# Patient Record
Sex: FEMALE | Race: WHITE | NOT HISPANIC OR LATINO | Employment: FULL TIME | URBAN - METROPOLITAN AREA
[De-identification: names, ages, dates, MRNs, and addresses within clinical notes are randomized per-mention and may not be internally consistent; named-entity substitution may affect disease eponyms.]

---

## 2017-01-03 ENCOUNTER — OFFICE VISIT (OUTPATIENT)
Dept: OBSTETRICS AND GYNECOLOGY | Facility: CLINIC | Age: 32
End: 2017-01-03
Payer: COMMERCIAL

## 2017-01-03 VITALS
BODY MASS INDEX: 21.27 KG/M2 | WEIGHT: 124.56 LBS | DIASTOLIC BLOOD PRESSURE: 58 MMHG | HEIGHT: 64 IN | SYSTOLIC BLOOD PRESSURE: 110 MMHG

## 2017-01-03 DIAGNOSIS — Z30.9 ENCOUNTER FOR CONTRACEPTIVE MANAGEMENT, UNSPECIFIED CONTRACEPTIVE ENCOUNTER TYPE: ICD-10-CM

## 2017-01-03 DIAGNOSIS — Z01.419 ENCOUNTER FOR GYNECOLOGICAL EXAMINATION WITHOUT ABNORMAL FINDING: Primary | ICD-10-CM

## 2017-01-03 PROCEDURE — 99395 PREV VISIT EST AGE 18-39: CPT | Mod: S$GLB,,, | Performed by: OBSTETRICS & GYNECOLOGY

## 2017-01-03 PROCEDURE — 88175 CYTOPATH C/V AUTO FLUID REDO: CPT

## 2017-01-03 PROCEDURE — 99999 PR PBB SHADOW E&M-EST. PATIENT-LVL III: CPT | Mod: PBBFAC,,, | Performed by: OBSTETRICS & GYNECOLOGY

## 2017-01-03 NOTE — PROGRESS NOTES
"CC: Well woman exam    Sofi Vidal is a 31 y.o. female  presents for a well woman exam.  She is considering future birth control.   Does not want OCPs.   Coping with the loss of her , and is raising their son.         Past Medical History   Diagnosis Date    Abnormal cervical Papanicolaou smear 12     Colposcopy , HONEY 1    Cholelithiasis complicating pregnancy, antepartum        Past Surgical History   Procedure Laterality Date    Colposcopy         OB History    Para Term  AB SAB TAB Ectopic Multiple Living   1 1 0 0 0 0 0 0 0 0      # Outcome Date GA Lbr Mahin/2nd Weight Sex Delivery Anes PTL Lv   1 Para                   Family History   Problem Relation Age of Onset    Breast cancer Maternal Grandmother 68    Colon cancer Neg Hx     Ovarian cancer Neg Hx        Social History   Substance Use Topics    Smoking status: Never Smoker    Smokeless tobacco: Never Used    Alcohol use Yes      Comment: occ       Visit Vitals    BP (!) 110/58    Ht 5' 4" (1.626 m)    Wt 56.5 kg (124 lb 9 oz)    LMP 2016    BMI 21.38 kg/m2       ROS:  GENERAL: Denies weight gain or weight loss. Feeling well overall.   SKIN: Denies rash or lesions.   HEAD: Denies head injury or headache.   NODES: Denies enlarged lymph nodes.   CHEST: Denies chest pain or shortness of breath.   CARDIOVASCULAR: Denies palpitations or left sided chest pain.   ABDOMEN: No abdominal pain, constipation, diarrhea, nausea, vomiting or rectal bleeding.   URINARY: No frequency, dysuria, hematuria, or burning on urination.  REPRODUCTIVE: See HPI.   BREASTS: The patient performs breast self-examination and denies pain, lumps, or nipple discharge.   HEMATOLOGIC: No easy bruisability or excessive bleeding.  MUSCULOSKELETAL: Denies joint pain or swelling.   NEUROLOGIC: Denies syncope or weakness.   PSYCHIATRIC: Denies depression, anxiety or mood swings.    Physical Exam:    APPEARANCE: Well nourished, well " developed, in no acute distress.  AFFECT: WNL, alert and oriented x 3  SKIN: No acne or hirsutism  NECK: Neck symmetric without masses or thyromegaly  NODES: No inguinal, cervical, axillary, or femoral lymph node enlargement  CHEST: Good respiratory effect  ABDOMEN: Soft.  No tenderness or masses.  No hepatosplenomegaly.  No hernias.  BREASTS: Symmetrical, no skin changes or visible lesions.  No palpable masses, nipple discharge bilaterally.  PELVIC: Normal external genitalia without lesions.  Normal hair distribution.  Adequate perineal body, normal urethral meatus.  Vagina moist and well rugated without lesions or discharge.  Cervix pink, without lesions, discharge or tenderness.  No significant cystocele or rectocele.  Bimanual exam shows uterus to be normal size, regular, mobile and nontender.  Adnexa without masses or tenderness.    EXTREMITIES: No edema.    ASSESSMENT AND PLAN  1. Encounter for gynecological examination without abnormal finding  Liquid-based pap smear, screening   2. Encounter for contraceptive management, unspecified contraceptive encounter type       Discussed birth control options- possible OCPs, combination vs progesterone only pill, NUVA RING, ORTHO EVRA RING, increased risks of elevated BP   On birth control with estrogen.  Possible MIRENA IUD VS Paragard IUD. NEXAPLANON   Possible barrier methods- sponge, diaphragm, spermacides, condoms.         Patient was counseled today on A.C.S. Pap guidelines and recommendations for yearly pelvic exams, mammograms and monthly self breast exams; to see her PCP for other health maintenance.   F/U for MIRENA INSERTION

## 2017-01-03 NOTE — MR AVS SNAPSHOT
"    [place] [facility]   [facility]  [place] [address]  Phone: [phone]                  Sofi Vidal   1/3/2017 10:00 AM   Office Visit    Description:  Female : 1985   Provider:  Veronica Mccormack MD   Department:  [place] [facility]           Reason for Visit     Well Woman                To Do List           Future Appointments        Provider Department Dept Phone    2017 1:00 PM Cheri June MD [place] [facility] [phone]      Goals (5 Years of Data)     None      [facility]     [facility] -  Assistance  Registered nurses in the [facility] On Call Center provide clinical advisement, health education, appointment booking, and other advisory services.  Call for this free service at [phone].             Medications           Message regarding Medications     Verify the changes and/or additions to your medication regime listed below are the same as discussed with your clinician today.  If any of these changes or additions are incorrect, please notify your healthcare provider.        STOP taking these medications     norgestimate-ethinyl estradiol (ORTHO TRI-CYCLEN,TRI-SPRINTEC) 0.18/0.215/0.25 mg-35 mcg (28) tablet Take 1 tablet by mouth once daily.           Verify that the below list of medications is an accurate representation of the medications you are currently taking.  If none reported, the list may be blank. If incorrect, please contact your healthcare provider. Carry this list with you in case of emergency.           Current Medications     albuterol 90 mcg/actuation inhaler Inhale 2 puffs into the lungs every 6 (six) hours as needed for Wheezing.           Clinical Reference Information           Vital Signs - Last Recorded  Most recent update: 1/3/2017 10:15 AM by Sasha Maldonado MA    BP Ht Wt LMP BMI    (!) 110/58 5' 4" (1.626 m) 56.5 kg (124 lb 9 oz) 2016 21.38 kg/m2      Blood Pressure          Most " Recent Value    BP  (!)  110/58      Allergies as of 1/3/2017     No Known Allergies      Immunizations Administered on Date of Encounter - 1/3/2017     None

## 2017-01-04 ENCOUNTER — OFFICE VISIT (OUTPATIENT)
Dept: INTERNAL MEDICINE | Facility: CLINIC | Age: 32
End: 2017-01-04
Payer: COMMERCIAL

## 2017-01-04 VITALS
DIASTOLIC BLOOD PRESSURE: 62 MMHG | RESPIRATION RATE: 16 BRPM | SYSTOLIC BLOOD PRESSURE: 98 MMHG | HEART RATE: 94 BPM | HEIGHT: 64 IN | TEMPERATURE: 98 F | WEIGHT: 127.44 LBS | BODY MASS INDEX: 21.76 KG/M2

## 2017-01-04 DIAGNOSIS — Z00.00 ANNUAL PHYSICAL EXAM: Primary | ICD-10-CM

## 2017-01-04 DIAGNOSIS — G47.00 INSOMNIA, UNSPECIFIED TYPE: ICD-10-CM

## 2017-01-04 DIAGNOSIS — K80.20 CALCULUS OF GALLBLADDER WITHOUT CHOLECYSTITIS WITHOUT OBSTRUCTION: ICD-10-CM

## 2017-01-04 PROCEDURE — 90688 IIV4 VACCINE SPLT 0.5 ML IM: CPT | Mod: S$GLB,,, | Performed by: INTERNAL MEDICINE

## 2017-01-04 PROCEDURE — 90471 IMMUNIZATION ADMIN: CPT | Mod: S$GLB,,, | Performed by: INTERNAL MEDICINE

## 2017-01-04 PROCEDURE — 99999 PR PBB SHADOW E&M-EST. PATIENT-LVL III: CPT | Mod: PBBFAC,,, | Performed by: INTERNAL MEDICINE

## 2017-01-04 PROCEDURE — 99385 PREV VISIT NEW AGE 18-39: CPT | Mod: 25,S$GLB,, | Performed by: INTERNAL MEDICINE

## 2017-01-04 RX ORDER — MULTIVITAMIN
1 TABLET ORAL DAILY
COMMUNITY
End: 2018-02-27

## 2017-01-04 NOTE — PROGRESS NOTES
Subjective:      Sofi Vidal is a 31 y.o.yo female who presents for annual exam.    Family History:  family history includes Breast cancer (age of onset: 68) in her maternal grandmother; Cancer in her maternal grandmother. There is no history of Colon cancer or Ovarian cancer.    Health Maintenance:  Health Maintenance       Date Due Completion Date    Lipid Panel 1985 ---    Pap Smear 2004 ---    Influenza Vaccine 2016 ---    TETANUS VACCINE 2024      OB/GYN 2017  Tetanus     Eye exam: 2016  Dental Exam: 2016    Body mass index is 21.87 kg/(m^2).      Meds:   Current Outpatient Prescriptions:     albuterol 90 mcg/actuation inhaler, Inhale 2 puffs into the lungs every 6 (six) hours as needed for Wheezing., Disp: 18 g, Rfl: 1    LACTOBACILLUS ACIDOPHILUS (PROBIOTIC ORAL), Take by mouth., Disp: , Rfl:     multivitamin (ONE DAILY MULTIVITAMIN) per tablet, Take 1 tablet by mouth once daily., Disp: , Rfl:     PMHx:   Past Medical History   Diagnosis Date    Abnormal cervical Papanicolaou smear 12     Colposcopy , HONEY 1    Childhood asthma     Cholelithiasis complicating pregnancy, antepartum        PSHx:  Past Surgical History   Procedure Laterality Date    Colposcopy      Lasik         SocHx:   Social History     Social History    Marital status:      Spouse name: N/A    Number of children: N/A    Years of education: N/A     Social History Main Topics    Smoking status: Never Smoker    Smokeless tobacco: Never Used    Alcohol use Yes      Comment: 2 drinks per week    Drug use: No    Sexual activity: Yes     Partners: Male     Birth control/ protection: None     Other Topics Concern    None     Social History Narrative     recently  of cancer, has a 2 year old child       Review of Systems   Constitutional: Negative for chills, diaphoresis, fatigue and fever.   HENT: Negative for congestion, ear discharge, ear pain,  postnasal drip, rhinorrhea, sinus pressure and sore throat.         Seasonal allergy symptoms relieved with Claritin   Eyes: Negative for visual disturbance.   Respiratory: Negative for cough, chest tightness, shortness of breath and wheezing.    Cardiovascular: Negative for chest pain, palpitations and leg swelling.   Gastrointestinal: Negative for abdominal pain, blood in stool, constipation, diarrhea, nausea and vomiting.        H/o gallstones during pregnancy, intermittent abdominal pain after eating fatty fried foods so avoids them   Genitourinary: Negative for decreased urine volume, dysuria, frequency, hematuria and menstrual problem.        Hematuria reported on labs for life insurance, denies gross hematuria, no frequent UTI's   Musculoskeletal: Negative for arthralgias and myalgias.   Skin: Negative for rash.   Neurological: Positive for dizziness. Negative for weakness, light-headedness, numbness and headaches.   Hematological: Negative for adenopathy.       Objective:      Physical Exam   Constitutional: She is oriented to person, place, and time. Vital signs are normal. She appears well-developed and well-nourished. No distress.   HENT:   Head: Normocephalic and atraumatic.   Right Ear: Hearing, tympanic membrane, external ear and ear canal normal. Tympanic membrane is not erythematous and not bulging.   Left Ear: Hearing, tympanic membrane, external ear and ear canal normal. Tympanic membrane is not erythematous and not bulging.   Nose: Nose normal.   Mouth/Throat: Uvula is midline, oropharynx is clear and moist and mucous membranes are normal. No oropharyngeal exudate.   Eyes: Conjunctivae, EOM and lids are normal. Pupils are equal, round, and reactive to light. No scleral icterus.   Neck: Normal range of motion. Neck supple. No thyroid mass and no thyromegaly present.   Cardiovascular: Normal rate, regular rhythm, normal heart sounds and intact distal pulses.    No murmur heard.  Pulmonary/Chest:  Effort normal and breath sounds normal. She has no wheezes.   Abdominal: Soft. Bowel sounds are normal. She exhibits no distension. There is no hepatosplenomegaly. There is no tenderness. There is no rigidity, no rebound, no guarding and negative Lutz's sign.   Musculoskeletal: Normal range of motion. She exhibits no edema.   Lymphadenopathy:     She has no cervical adenopathy.        Right: No supraclavicular adenopathy present.        Left: No supraclavicular adenopathy present.   Neurological: She is alert and oriented to person, place, and time. She has normal strength and normal reflexes. She displays no tremor. No sensory deficit. Coordination and gait normal.   Skin: Skin is warm, dry and intact. No rash noted. She is not diaphoretic.   Psychiatric: She has a normal mood and affect.   Vitals reviewed.      Assessment:       1. Annual physical exam    2. Insomnia, unspecified type    3. Calculus of gallbladder without cholecystitis without obstruction        Plan:       1. Ordered CBC, CMP, TSH, lipid panel and U/A. Administered flu vaccine.   2. Has tried OTC sleep aids in past, may try melatonin at night.  3. Discovered during pregnancy, not symptomatic at this time.     RTC in 1 year or sooner if needed    Cheri June MD

## 2017-01-04 NOTE — MR AVS SNAPSHOT
[place] - [facility]   [facility]  [place] [address]  Phone: [phone]  Fax: [phone]                  Sofi Vidal   2017 1:00 PM   Office Visit    Description:  Female : 1985   Provider:  Cheri June MD   Department:  [place] [facility]           Reason for Visit     Annual Exam           Diagnoses this Visit        Comments    Annual physical exam    -  Primary     Insomnia, unspecified type                To Do List           Future Appointments        Provider Department Dept Phone    2017 10:00 AM SPECIMEN, [facility] - Specimen Lab [phone]    2017 10:15 AM LAB, [facility] - Laboratory [phone]      Goals (5 Years of Data)     None      [facility]     [facility] On Call Nurse Care Line -  Assistance  Registered nurses in the [facility] On Call Center provide clinical advisement, health education, appointment booking, and other advisory services.  Call for this free service at [phone].             Medications           Message regarding Medications     Verify the changes and/or additions to your medication regime listed below are the same as discussed with your clinician today.  If any of these changes or additions are incorrect, please notify your healthcare provider.             Verify that the below list of medications is an accurate representation of the medications you are currently taking.  If none reported, the list may be blank. If incorrect, please contact your healthcare provider. Carry this list with you in case of emergency.           Current Medications     albuterol 90 mcg/actuation inhaler Inhale 2 puffs into the lungs every 6 (six) hours as needed for Wheezing.           Clinical Reference Information           Vital Signs - Last Recorded  Most recent update: 2017  1:10 PM by Jose R Farmer MA    BP Pulse Temp Resp Ht Wt    98/62 (BP Location: Left arm, Patient Position:  "Sitting, BP Method: Manual) 94 98 °F (36.7 °C) (Oral) 16 5' 4" (1.626 m) 57.8 kg (127 lb 6.8 oz)    LMP BMI             12/11/2016 21.87 kg/m2         Blood Pressure          Most Recent Value    BP  98/62      Allergies as of 1/4/2017     No Known Allergies      Immunizations Administered on Date of Encounter - 1/4/2017     None      Orders Placed During Today's Visit     Future Labs/Procedures Expected by Expires    CBC auto differential  1/4/2017 3/5/2018    Comprehensive metabolic panel  1/4/2017 3/5/2018    Lipid panel  1/4/2017 3/5/2018    TSH  1/4/2017 3/5/2018    Urinalysis  As directed 1/4/2018      Instructions    May try melatonin for sleep    Behavioral Modifications for Insomnia:     1. Implement stimulus control: [place] bedroom for sleep and intimacy only. Do not use electronics in bed. Leave bedroom when frustrated from not sleeping. Engage in relaxation before returning.    2. Avoid clock watching. Avoid thinking/worrying about sleep when trying to fall asleep.    3.  Maintain a regular sleep schedule: go to bed at the same time and wake up at the same time each day. Staying aying awake during the day helps you to fall asleep at night.     4. Limit caffeinated products. Caffeinated beverages and foods (coffee, tea, cola, chocolate) can cause difficulty falling asleep, awakenings during the night, and shallow sleep.  Even caffeine early in the day can disrupt nighttime sleep.    5. Make sure the bedroom is dark, quiet and cool. A comfortable, noise-free sleep environment will reduce the likelihood that you will wake up during the night.  Noise that does not awaken you may also disturb the quality of your sleep.  Carpeting, insulated curtains, and closing the door may help.    6. Exercise regularly. Increase physical activity but avoid strenuous exercise near bedtime. Schedule exercise times so that they do not occur within 3 hours of when you intend to go to bed.  Exercise makes it easier to initiate " sleep and deepen sleep.    7. Eat regular meals and do not go to bed hungry. Hunger may disturb sleep.  A light snack at bedtime (especially carbohydrates) may help sleep, but avoid greasy or heavy foods.    8. Avoid excessive liquids in the evening. Reducing liquid intake will minimize the need for nighttime trips to the bathroom.

## 2017-01-04 NOTE — PATIENT INSTRUCTIONS
May try melatonin for sleep    Behavioral Modifications for Insomnia:     1. Implement stimulus control: [place] bedroom for sleep and intimacy only. Do not use electronics in bed. Leave bedroom when frustrated from not sleeping. Engage in relaxation before returning.    2. Avoid clock watching. Avoid thinking/worrying about sleep when trying to fall asleep.    3.  Maintain a regular sleep schedule: go to bed at the same time and wake up at the same time each day. Staying aying awake during the day helps you to fall asleep at night.     4. Limit caffeinated products. Caffeinated beverages and foods (coffee, tea, cola, chocolate) can cause difficulty falling asleep, awakenings during the night, and shallow sleep.  Even caffeine early in the day can disrupt nighttime sleep.    5. Make sure the bedroom is dark, quiet and cool. A comfortable, noise-free sleep environment will reduce the likelihood that you will wake up during the night.  Noise that does not awaken you may also disturb the quality of your sleep.  Carpeting, insulated curtains, and closing the door may help.    6. Exercise regularly. Increase physical activity but avoid strenuous exercise near bedtime. Schedule exercise times so that they do not occur within 3 hours of when you intend to go to bed.  Exercise makes it easier to initiate sleep and deepen sleep.    7. Eat regular meals and do not go to bed hungry. Hunger may disturb sleep.  A light snack at bedtime (especially carbohydrates) may help sleep, but avoid greasy or heavy foods.    8. Avoid excessive liquids in the evening. Reducing liquid intake will minimize the need for nighttime trips to the bathroom.

## 2017-01-05 ENCOUNTER — LAB VISIT (OUTPATIENT)
Dept: LAB | Facility: HOSPITAL | Age: 32
End: 2017-01-05
Attending: INTERNAL MEDICINE
Payer: COMMERCIAL

## 2017-01-05 DIAGNOSIS — Z00.00 ANNUAL PHYSICAL EXAM: ICD-10-CM

## 2017-01-05 LAB
ALBUMIN SERPL BCP-MCNC: 3.9 G/DL
ALP SERPL-CCNC: 51 U/L
ALT SERPL W/O P-5'-P-CCNC: 10 U/L
ANION GAP SERPL CALC-SCNC: 9 MMOL/L
AST SERPL-CCNC: 15 U/L
BASOPHILS # BLD AUTO: 0.03 K/UL
BASOPHILS NFR BLD: 0.5 %
BILIRUB SERPL-MCNC: 1.3 MG/DL
BUN SERPL-MCNC: 11 MG/DL
CALCIUM SERPL-MCNC: 9 MG/DL
CHLORIDE SERPL-SCNC: 105 MMOL/L
CHOLEST/HDLC SERPL: 2.4 {RATIO}
CO2 SERPL-SCNC: 24 MMOL/L
CREAT SERPL-MCNC: 0.7 MG/DL
DIFFERENTIAL METHOD: NORMAL
EOSINOPHIL # BLD AUTO: 0.1 K/UL
EOSINOPHIL NFR BLD: 1.6 %
ERYTHROCYTE [DISTWIDTH] IN BLOOD BY AUTOMATED COUNT: 13.2 %
EST. GFR  (AFRICAN AMERICAN): >60 ML/MIN/1.73 M^2
EST. GFR  (NON AFRICAN AMERICAN): >60 ML/MIN/1.73 M^2
GLUCOSE SERPL-MCNC: 73 MG/DL
HCT VFR BLD AUTO: 39.6 %
HDL/CHOLESTEROL RATIO: 41.7 %
HDLC SERPL-MCNC: 187 MG/DL
HDLC SERPL-MCNC: 78 MG/DL
HGB BLD-MCNC: 13.1 G/DL
LDLC SERPL CALC-MCNC: 95 MG/DL
LYMPHOCYTES # BLD AUTO: 1.4 K/UL
LYMPHOCYTES NFR BLD: 25.2 %
MCH RBC QN AUTO: 28.9 PG
MCHC RBC AUTO-ENTMCNC: 33.1 %
MCV RBC AUTO: 87 FL
MONOCYTES # BLD AUTO: 0.4 K/UL
MONOCYTES NFR BLD: 6.7 %
NEUTROPHILS # BLD AUTO: 3.6 K/UL
NEUTROPHILS NFR BLD: 65.8 %
NONHDLC SERPL-MCNC: 109 MG/DL
PLATELET # BLD AUTO: 185 K/UL
PMV BLD AUTO: 11.7 FL
POTASSIUM SERPL-SCNC: 4 MMOL/L
PROT SERPL-MCNC: 7.1 G/DL
RBC # BLD AUTO: 4.53 M/UL
SODIUM SERPL-SCNC: 138 MMOL/L
TRIGL SERPL-MCNC: 70 MG/DL
TSH SERPL DL<=0.005 MIU/L-ACNC: 1.3 UIU/ML
WBC # BLD AUTO: 5.52 K/UL

## 2017-01-05 PROCEDURE — 80061 LIPID PANEL: CPT

## 2017-01-05 PROCEDURE — 36415 COLL VENOUS BLD VENIPUNCTURE: CPT | Mod: PO

## 2017-01-05 PROCEDURE — 84443 ASSAY THYROID STIM HORMONE: CPT

## 2017-01-05 PROCEDURE — 85025 COMPLETE CBC W/AUTO DIFF WBC: CPT

## 2017-01-05 PROCEDURE — 80053 COMPREHEN METABOLIC PANEL: CPT

## 2017-01-13 ENCOUNTER — TELEPHONE (OUTPATIENT)
Dept: OBSTETRICS AND GYNECOLOGY | Facility: CLINIC | Age: 32
End: 2017-01-13

## 2017-01-13 NOTE — TELEPHONE ENCOUNTER
Spoke with pt. Patient advised Mirena covered 100% under ins plan. We will proceed with pharmacy request to Mirena in which the product will be shipped to our office for insertion.  will then bill for INSERTION ONLY.     Pt advised to expect a call from a [phone] or [phone] Mirena will call for the pt's permission before shipping the product to the office, Pt verbalized understanding

## 2017-01-19 ENCOUNTER — TELEPHONE (OUTPATIENT)
Dept: OBSTETRICS AND GYNECOLOGY | Facility: CLINIC | Age: 32
End: 2017-01-19

## 2017-01-19 NOTE — TELEPHONE ENCOUNTER
----- Message from Kaiser Funk sent at 1/19/2017  1:16 PM CST -----  Contact: Candice with [employer]  _ 1st Request  _ 2nd Request  _ 3rd Request      Who: Candice with [employer]    Why:  Candice with [employer] states she needs to speak with staff to get medical records for ERNST MCCARTHY [[id number]]    What Number to Call Back: [phone]    When to Expect a call back: (Before the end of the day)  -- if call after 3:00 call back will be tomorrow.

## 2017-01-20 ENCOUNTER — TELEPHONE (OUTPATIENT)
Dept: OBSTETRICS AND GYNECOLOGY | Facility: CLINIC | Age: 32
End: 2017-01-20

## 2017-01-20 NOTE — TELEPHONE ENCOUNTER
----- Message from Reyna Sosa sent at 1/20/2017  1:32 PM CST -----  Contact: js  x_  1st Request  _  2nd Request  _  3rd Request      Who:js     Why: returning call back     What Number to Call Back: [phone]    When to Expect a call back: (Before the end of the day)   -- if call after 3:00 call back will be tomorrow.

## 2017-01-20 NOTE — TELEPHONE ENCOUNTER
"Spoke with Candice. Candice states she is trying to obtain some records on this patient and when she sent a request to our medical records dept they sent it to a company called [employer]?? The result came back PATIENT NOT FOUND. Candice states she called the pt whom advised her she was just there last week and not sure why there aren't any records. Candice advised I could give her the number to Medical records so she can speak with someone there. Candice states" I have already been through that department, so what good is that going to do me. It'll just come back PATIENT NOT FOUND again." Candice advised I can give her info to manager of OB/GYN dept to speak with her regarding this matter. Candice verbalized understanding. Candice provided the name of management, advised Jayda is not avail today. But will receive a call soon. Candice verbalized understanding   "

## 2017-01-23 ENCOUNTER — TELEPHONE (OUTPATIENT)
Dept: OBSTETRICS AND GYNECOLOGY | Facility: CLINIC | Age: 32
End: 2017-01-23

## 2017-01-23 NOTE — TELEPHONE ENCOUNTER
ADDENDUM:    I SPOKE WITH CRISTIAN Friday. I ADVISED CRISTIAN I WOULD GIVE HER INFORMATION TO MANAGEMENT AND HAVE SOMEONE CALL HER BACK.  CRISTIAN VERBALIZED UNDERSTANDING. SPOKE WITH  AS WELL REGARDING THIS AND WAS ADVISED TO GIVE THIS INFORMATION TO MANAGEMENT    PROVIDED MESSAGE TO CHEMA ON TODAY WHOM WILL LOOK INTO THIS MATTER

## 2017-01-26 ENCOUNTER — TELEPHONE (OUTPATIENT)
Dept: OBSTETRICS AND GYNECOLOGY | Facility: CLINIC | Age: 32
End: 2017-01-26

## 2017-01-26 NOTE — TELEPHONE ENCOUNTER
MIRENA IUD RECEIVED IN THE OFFICE ON TODAY    Called pt. Patient advised. Pt ready to schedule. Pt advised we like to insert while on menses. Patient states she should be on her cycle the end of the week of 2/6. Appt set with pt. Pt aware [facility] location. Aware time/location will mail slip

## 2017-02-08 ENCOUNTER — TELEPHONE (OUTPATIENT)
Dept: OBSTETRICS AND GYNECOLOGY | Facility: CLINIC | Age: 32
End: 2017-02-08

## 2017-02-08 NOTE — TELEPHONE ENCOUNTER
Patient has a viral illness and she needs to put on the schedule for Friday at 930 am and cancel tomorrow appt. She will have MIRENA IUD inserted

## 2017-02-10 ENCOUNTER — PROCEDURE VISIT (OUTPATIENT)
Dept: OBSTETRICS AND GYNECOLOGY | Facility: CLINIC | Age: 32
End: 2017-02-10
Payer: COMMERCIAL

## 2017-02-10 VITALS
BODY MASS INDEX: 21.19 KG/M2 | HEIGHT: 64 IN | SYSTOLIC BLOOD PRESSURE: 100 MMHG | DIASTOLIC BLOOD PRESSURE: 60 MMHG | WEIGHT: 124.13 LBS

## 2017-02-10 DIAGNOSIS — Z30.9 ENCOUNTER FOR CONTRACEPTIVE MANAGEMENT, UNSPECIFIED CONTRACEPTIVE ENCOUNTER TYPE: Primary | ICD-10-CM

## 2017-02-10 LAB
B-HCG UR QL: NEGATIVE
CTP QC/QA: YES

## 2017-02-10 PROCEDURE — 81025 URINE PREGNANCY TEST: CPT | Mod: S$GLB,,, | Performed by: OBSTETRICS & GYNECOLOGY

## 2017-02-10 PROCEDURE — 58300 INSERT INTRAUTERINE DEVICE: CPT | Mod: S$GLB,,, | Performed by: OBSTETRICS & GYNECOLOGY

## 2017-02-10 NOTE — PROCEDURES
Insertin of IUD-Today  Date/Time: 2/10/2017 10:52 AM  Performed by: AUDI EASTON  Authorized by: AUDI EASTON   Preparation: Patient was prepped and draped in the usual sterile fashion.  Local anesthesia used: no    Anesthesia:  Local anesthesia used: no  Sedation:  Patient sedated: no    Patient tolerance: Patient tolerated the procedure well with no immediate complications      Sofi Vidal is a 31 y.o. female  presents for IUD placement.  Patient's last menstrual period was 2017..  She desires Mirena.  UPT is negative.      She was counseled on the risks, benefits, indications, and alternatives to IUD use.  She understands that with insertion there is a risk of bleeding, infection, and uterine perforation.  All questions are answered.  Consents signed.  Cervical cultures NA performed.    Procedure:  Time out performed.  The cervix was visualized with a speculum.  A single tooth tenaculum was placed on the anterior lip of the cervix.  The uterus sounds to 7cm using sterile technique.  A Mirena was loaded and placed high in the uterine fundus without difficulty using sterile technique.  The string was was then cut.  The tenaculum and speculum were removed.  The patient tolerated the procedure well.    Assessment:  1.  Contraceptive management/IUD insertion    Post IUD placement counseling:  Manage post IUD placement pain with NSAIDS, Tylenol or Rx per Med[facility].  IUD danger signs and how to check for strings were discussed.  The IUD needs to be removed in 5 years for Mirena and 10 years for Copper IUD.    Counseling lasted approximately 15 minutes and all her questions were answered.    Follow up:  4 weeks.

## 2017-02-10 NOTE — MR AVS SNAPSHOT
"    [facility] - OB/GYN 5th Floor  [address]  [facility] [address]  Phone: [phone]                  Sofi Vidal   2/10/2017 9:30 AM   Procedure visit    Description:  Female : 1985   Provider:  Veronica Mccormack MD   Department:  [facility] - OB/GYN 5th Floor           Reason for Visit     Contraception                To Do List           Goals (5 Years of Data)     None      [facility]     [facility] On Call Nurse Care Line -  Assistance  Registered nurses in the [facility] On Call Center provide clinical advisement, health education, appointment booking, and other advisory services.  Call for this free service at [phone].             Medications           Message regarding Medications     Verify the changes and/or additions to your medication regime listed below are the same as discussed with your clinician today.  If any of these changes or additions are incorrect, please notify your healthcare provider.             Verify that the below list of medications is an accurate representation of the medications you are currently taking.  If none reported, the list may be blank. If incorrect, please contact your healthcare provider. Carry this list with you in case of emergency.           Current Medications     albuterol 90 mcg/actuation inhaler Inhale 2 puffs into the lungs every 6 (six) hours as needed for Wheezing.    LACTOBACILLUS ACIDOPHILUS (PROBIOTIC ORAL) Take by mouth.    multivitamin (ONE DAILY MULTIVITAMIN) per tablet Take 1 tablet by mouth once daily.           Clinical Reference Information           Your Vitals Were     BP Height Weight Last Period BMI    100/60 5' 4" (1.626 m) 56.3 kg (124 lb 1.9 oz) 2017 21.3 kg/m2      Blood Pressure          Most Recent Value    BP  100/60      Allergies as of 2/10/2017     No Known Allergies      Immunizations Administered on Date of Encounter - 2/10/2017     None      Language Assistance Services     ATTENTION: Language " assistance services are available, free of charge. Please call [phone].      ATENCIÓN: Si habla brittany, tiene a santana disposición servicios gratuitos de asistencia lingüística. Llame al [phone].     CHÚ Ý: N?u b?n nói Ti?ng Vi?t, có các d?ch v? h? tr? ngôn ng? mi?n phí dành cho b?n. G?i s? [phone].         [facility] - OB/[facility]N 5th Floor complies with applicable Federal civil rights laws and does not discriminate on the basis of race, color, national origin, age, disability, or sex.

## 2017-03-28 ENCOUNTER — OFFICE VISIT (OUTPATIENT)
Dept: OBSTETRICS AND GYNECOLOGY | Facility: CLINIC | Age: 32
End: 2017-03-28
Payer: COMMERCIAL

## 2017-03-28 VITALS
SYSTOLIC BLOOD PRESSURE: 98 MMHG | BODY MASS INDEX: 21.71 KG/M2 | HEIGHT: 64 IN | DIASTOLIC BLOOD PRESSURE: 60 MMHG | WEIGHT: 127.19 LBS

## 2017-03-28 DIAGNOSIS — Z30.431 IUD CHECK UP: Primary | ICD-10-CM

## 2017-03-28 PROCEDURE — 99999 PR PBB SHADOW E&M-EST. PATIENT-LVL III: CPT | Mod: PBBFAC,,, | Performed by: OBSTETRICS & GYNECOLOGY

## 2017-03-28 PROCEDURE — 99213 OFFICE O/P EST LOW 20 MIN: CPT | Mod: S$GLB,,, | Performed by: OBSTETRICS & GYNECOLOGY

## 2017-03-28 PROCEDURE — 1160F RVW MEDS BY RX/DR IN RCRD: CPT | Mod: S$GLB,,, | Performed by: OBSTETRICS & GYNECOLOGY

## 2017-03-28 NOTE — MR AVS SNAPSHOT
"    [facility] - [facility]/ GYN   [facility]  [place] [address]  Phone: [phone]                  Sofi Vidal   3/28/2017 10:30 AM   Office Visit    Description:  Female : 1985   Provider:  Veronica Mccormack MD   Department:  [facility] - OB/ GYN           Reason for Visit     IUD Check                To Do List           Goals (5 Years of Data)     None      [facility]     [facility] On Call Nurse Care Line -  Assistance  Registered nurses in the [facility] On Call Center provide clinical advisement, health education, appointment booking, and other advisory services.  Call for this free service at [phone].             Medications           Message regarding Medications     Verify the changes and/or additions to your medication regime listed below are the same as discussed with your clinician today.  If any of these changes or additions are incorrect, please notify your healthcare provider.             Verify that the below list of medications is an accurate representation of the medications you are currently taking.  If none reported, the list may be blank. If incorrect, please contact your healthcare provider. Carry this list with you in case of emergency.           Current Medications     albuterol 90 mcg/actuation inhaler Inhale 2 puffs into the lungs every 6 (six) hours as needed for Wheezing.    LACTOBACILLUS ACIDOPHILUS (PROBIOTIC ORAL) Take by mouth.    multivitamin (ONE DAILY MULTIVITAMIN) per tablet Take 1 tablet by mouth once daily.           Clinical Reference Information           Your Vitals Were     BP Height Weight Last Period BMI    98/60 5' 4" (1.626 m) 57.7 kg (127 lb 3.3 oz) 2017 21.83 kg/m2      Blood Pressure          Most Recent Value    BP  98/60      Allergies as of 3/28/2017     No Known Allergies      Immunizations Administered on Date of Encounter - 3/28/2017     None      Language Assistance Services     ATTENTION: Language assistance services " are available, free of charge. Please call [phone].      ATENCIÓN: Si habla ritoañol, tiene a santana disposición servicios gratuitos de asistencia lingüística. Llame al [phone].     CHÚ Ý: N?u b?n nói Ti?ng Vi?t, có các d?ch v? h? tr? ngôn ng? mi?n phí dành cho b?n. G?i s? [phone].         [facility] complies with applicable Federal civil rights laws and does not discriminate on the basis of race, color, national origin, age, disability, or sex.

## 2017-12-12 ENCOUNTER — NURSE TRIAGE (OUTPATIENT)
Dept: ADMINISTRATIVE | Facility: CLINIC | Age: 32
End: 2017-12-12

## 2017-12-12 NOTE — TELEPHONE ENCOUNTER
"    Reason for Disposition   Poor fluid intake probably causing dizziness   [1] Blurred vision or visual changes AND [2] gradual onset (e.g., weeks, months)    Answer Assessment - Initial Assessment Questions  1. DESCRIPTION: "What is the vision loss like? Describe it for me." (e.g., complete vision loss, blurred vision, double vision, floaters, etc.)      blurred  2. LOCATION: "One or both eyes?" If one, ask: "Which eye?"      Not present  3. SEVERITY: "Can you see anything?" If so, ask: "What can you see?" (e.g., fine print)      -  4. ONSET: "When did this begin?" "Did it start suddenly or has this been gradual?"     Last week  5. PATTERN: "Does this come and go, or has it been constant since it started?"      intermittent  6. PAIN: "Is there any pain in your eye(s)?"  (Scale 1-10; or mild, moderate, severe)      no  7. CONTACTS-GLASSES: "Do you wear contacts or glasses?"      no  8. CAUSE: "What do you think is causing this visual problem?"     -  9. OTHER SYMPTOMS: "Do you have any other symptoms?" (e.g., headache, arm or leg weakness)      headache  10. PREGNANCY: "Is there any chance you are pregnant?" "When was your last menstrual period?"        no    Protocols used: ST DIZZINESS-A-OH, ST VISION LOSS OR CHANGE-A-AH      "

## 2017-12-13 ENCOUNTER — OFFICE VISIT (OUTPATIENT)
Dept: INTERNAL MEDICINE | Facility: CLINIC | Age: 32
End: 2017-12-13
Payer: COMMERCIAL

## 2017-12-13 ENCOUNTER — LAB VISIT (OUTPATIENT)
Dept: LAB | Facility: HOSPITAL | Age: 32
End: 2017-12-13
Attending: FAMILY MEDICINE
Payer: COMMERCIAL

## 2017-12-13 VITALS
DIASTOLIC BLOOD PRESSURE: 64 MMHG | HEIGHT: 64 IN | TEMPERATURE: 98 F | HEART RATE: 76 BPM | WEIGHT: 137.81 LBS | BODY MASS INDEX: 23.53 KG/M2 | SYSTOLIC BLOOD PRESSURE: 92 MMHG

## 2017-12-13 DIAGNOSIS — R42 DIZZINESS: Primary | ICD-10-CM

## 2017-12-13 DIAGNOSIS — R42 DIZZINESS: ICD-10-CM

## 2017-12-13 LAB
ALBUMIN SERPL BCP-MCNC: 3.8 G/DL
ALP SERPL-CCNC: 55 U/L
ALT SERPL W/O P-5'-P-CCNC: 13 U/L
ANION GAP SERPL CALC-SCNC: 7 MMOL/L
AST SERPL-CCNC: 17 U/L
BASOPHILS # BLD AUTO: 0.04 K/UL
BASOPHILS NFR BLD: 0.6 %
BILIRUB SERPL-MCNC: 0.8 MG/DL
BUN SERPL-MCNC: 9 MG/DL
CALCIUM SERPL-MCNC: 9.3 MG/DL
CHLORIDE SERPL-SCNC: 105 MMOL/L
CO2 SERPL-SCNC: 28 MMOL/L
CREAT SERPL-MCNC: 0.7 MG/DL
DIFFERENTIAL METHOD: NORMAL
EOSINOPHIL # BLD AUTO: 0.1 K/UL
EOSINOPHIL NFR BLD: 1.1 %
ERYTHROCYTE [DISTWIDTH] IN BLOOD BY AUTOMATED COUNT: 12.8 %
EST. GFR  (AFRICAN AMERICAN): >60 ML/MIN/1.73 M^2
EST. GFR  (NON AFRICAN AMERICAN): >60 ML/MIN/1.73 M^2
ESTIMATED AVG GLUCOSE: 91 MG/DL
GLUCOSE SERPL-MCNC: 75 MG/DL
HBA1C MFR BLD HPLC: 4.8 %
HCT VFR BLD AUTO: 38.6 %
HGB BLD-MCNC: 12.5 G/DL
IMM GRANULOCYTES # BLD AUTO: 0.03 K/UL
IMM GRANULOCYTES NFR BLD AUTO: 0.4 %
LYMPHOCYTES # BLD AUTO: 2.2 K/UL
LYMPHOCYTES NFR BLD: 31 %
MCH RBC QN AUTO: 29.3 PG
MCHC RBC AUTO-ENTMCNC: 32.4 G/DL
MCV RBC AUTO: 91 FL
MONOCYTES # BLD AUTO: 0.4 K/UL
MONOCYTES NFR BLD: 5.3 %
NEUTROPHILS # BLD AUTO: 4.5 K/UL
NEUTROPHILS NFR BLD: 61.6 %
NRBC BLD-RTO: 0 /100 WBC
PLATELET # BLD AUTO: 210 K/UL
PMV BLD AUTO: 11.5 FL
POTASSIUM SERPL-SCNC: 3.7 MMOL/L
PROT SERPL-MCNC: 7 G/DL
RBC # BLD AUTO: 4.26 M/UL
SODIUM SERPL-SCNC: 140 MMOL/L
T4 FREE SERPL-MCNC: 1.02 NG/DL
TSH SERPL DL<=0.005 MIU/L-ACNC: 1.84 UIU/ML
WBC # BLD AUTO: 7.23 K/UL

## 2017-12-13 PROCEDURE — 83036 HEMOGLOBIN GLYCOSYLATED A1C: CPT

## 2017-12-13 PROCEDURE — 85025 COMPLETE CBC W/AUTO DIFF WBC: CPT

## 2017-12-13 PROCEDURE — 80053 COMPREHEN METABOLIC PANEL: CPT

## 2017-12-13 PROCEDURE — 84439 ASSAY OF FREE THYROXINE: CPT

## 2017-12-13 PROCEDURE — 99999 PR PBB SHADOW E&M-EST. PATIENT-LVL III: CPT | Mod: PBBFAC,,, | Performed by: FAMILY MEDICINE

## 2017-12-13 PROCEDURE — 99214 OFFICE O/P EST MOD 30 MIN: CPT | Mod: S$GLB,,, | Performed by: FAMILY MEDICINE

## 2017-12-13 PROCEDURE — 36415 COLL VENOUS BLD VENIPUNCTURE: CPT | Mod: PO

## 2017-12-13 PROCEDURE — 84443 ASSAY THYROID STIM HORMONE: CPT

## 2017-12-13 PROCEDURE — 93010 ELECTROCARDIOGRAM REPORT: CPT | Mod: S$GLB,,, | Performed by: INTERNAL MEDICINE

## 2017-12-13 PROCEDURE — 93005 ELECTROCARDIOGRAM TRACING: CPT | Mod: S$GLB,,, | Performed by: FAMILY MEDICINE

## 2017-12-13 RX ORDER — MECLIZINE HYDROCHLORIDE 25 MG/1
25 TABLET ORAL 3 TIMES DAILY PRN
Qty: 30 TABLET | Refills: 0 | Status: SHIPPED | OUTPATIENT
Start: 2017-12-13 | End: 2018-02-27

## 2017-12-13 NOTE — PROGRESS NOTES
Subjective:       Patient ID: Sofi Vidal is a 32 y.o. female.    Chief Complaint: Dizziness (every morning and  when she turn her head ) and Blurred Vision (off and on)    HPI 32-year-old white female presents to clinic today secondary to concerns of episodic dizziness since December 3 after going to a [employer] party where she states she drank a little too much.  Since this time she began having episodes of dizziness upon awakening and occasional episodes of dizziness with positional changes.  She denies any chest pain, shortness of breath, or palpitations.  Secondarily, she notices occasional episodes which are unrelated to the dizziness of a mildly blurry vision.  She denies any headache but does note early on feeling some tenderness to her bilateral temples.  Review of Systems   Constitutional: Negative for appetite change, chills, fatigue and fever.   HENT: Negative for congestion, ear pain, hearing loss, postnasal drip, rhinorrhea, sinus pressure, sore throat and tinnitus.    Eyes: Positive for visual disturbance (Blurry). Negative for redness and itching.   Respiratory: Negative for cough, chest tightness and shortness of breath.    Cardiovascular: Negative for chest pain and palpitations.   Gastrointestinal: Negative for abdominal pain, constipation, diarrhea, nausea and vomiting.   Genitourinary: Negative for decreased urine volume, difficulty urinating, dysuria, frequency, hematuria and urgency.   Musculoskeletal: Negative for back pain, myalgias, neck pain and neck stiffness.   Skin: Negative for rash.   Neurological: Positive for dizziness, weakness and headaches (Sensation of tenderness to temples). Negative for light-headedness.   Psychiatric/Behavioral: Negative.        Objective:      Physical Exam   Constitutional: She is oriented to person, place, and time. She appears well-developed and well-nourished. No distress.   HENT:   Head: Normocephalic and atraumatic.   Right Ear: External ear  normal.   Left Ear: External ear normal.   Nose: Nose normal.   Mouth/Throat: Oropharynx is clear and moist. No oropharyngeal exudate.   Eyes: Conjunctivae and EOM are normal. Pupils are equal, round, and reactive to light. Right eye exhibits no discharge. Left eye exhibits no discharge. No scleral icterus.   Neck: Normal range of motion. Neck supple. No JVD present. No tracheal deviation present. No thyromegaly present.   Cardiovascular: Normal rate, regular rhythm, normal heart sounds and intact distal pulses.  Exam reveals no gallop and no friction rub.    No murmur heard.  Pulmonary/Chest: Effort normal and breath sounds normal. No stridor. No respiratory distress. She has no wheezes. She has no rales.   Abdominal: Soft. Bowel sounds are normal. She exhibits no distension and no mass. There is no tenderness. There is no rebound and no guarding.   Musculoskeletal: Normal range of motion. She exhibits no edema or tenderness.   Lymphadenopathy:     She has no cervical adenopathy.   Neurological: She is alert and oriented to person, place, and time.   Skin: Skin is warm and dry. No rash noted. She is not diaphoretic. No erythema. No pallor.   Psychiatric: She has a normal mood and affect. Her behavior is normal. Judgment and thought content normal.   Nursing note and vitals reviewed.    EKG: Normal sinus rhythm ventricular rate of 76 bpm.   Assessment:       1. Dizziness        Plan:     Dizziness  -     CBC auto differential; Future; Expected date: 12/13/2017  -     Comprehensive metabolic panel; Future; Expected date: 12/13/2017  -     TSH; Future; Expected date: 12/13/2017  -     T4, free; Future; Expected date: 12/13/2017  -     Urinalysis; Future; Expected date: 12/13/2017  -     Hemoglobin A1c; Future; Expected date: 12/13/2017  -     IN OFFICE EKG 12-LEAD (to Muse)  -     meclizine (ANTIVERT) 25 mg tablet; Take 1 tablet (25 mg total) by mouth 3 (three) times daily as needed for Dizziness or Nausea.  Dispense:  30 tablet; Refill: 0      Epley maneuver discussed and handout given.    Return to clinic as needed if symptoms persist or worsen.

## 2017-12-20 ENCOUNTER — TELEPHONE (OUTPATIENT)
Dept: INTERNAL MEDICINE | Facility: CLINIC | Age: 32
End: 2017-12-20

## 2017-12-20 DIAGNOSIS — R42 DIZZINESS: Primary | ICD-10-CM

## 2017-12-20 NOTE — TELEPHONE ENCOUNTER
----- Message from Diana Garcia sent at 12/20/2017 12:52 PM CST -----  Contact: Self  Pt is calling because she was seen recently for dizziness and says it's getting worse.  She would like to speak with Staff regarding being referred to a Specialist.    She can be reached at [phone].    Thank you.

## 2017-12-27 ENCOUNTER — OFFICE VISIT (OUTPATIENT)
Dept: NEUROLOGY | Facility: CLINIC | Age: 32
End: 2017-12-27
Payer: COMMERCIAL

## 2017-12-27 VITALS
WEIGHT: 141.31 LBS | HEIGHT: 64 IN | BODY MASS INDEX: 24.13 KG/M2 | SYSTOLIC BLOOD PRESSURE: 102 MMHG | DIASTOLIC BLOOD PRESSURE: 74 MMHG | HEART RATE: 74 BPM

## 2017-12-27 DIAGNOSIS — H81.10 BENIGN PAROXYSMAL POSITIONAL VERTIGO, UNSPECIFIED LATERALITY: ICD-10-CM

## 2017-12-27 DIAGNOSIS — R42 DIZZINESS: Primary | ICD-10-CM

## 2017-12-27 PROCEDURE — 99205 OFFICE O/P NEW HI 60 MIN: CPT | Mod: S$GLB,,, | Performed by: PSYCHIATRY & NEUROLOGY

## 2017-12-27 PROCEDURE — 99999 PR PBB SHADOW E&M-EST. PATIENT-LVL III: CPT | Mod: PBBFAC,,, | Performed by: STUDENT IN AN ORGANIZED HEALTH CARE EDUCATION/TRAINING PROGRAM

## 2017-12-27 RX ORDER — SCOLOPAMINE TRANSDERMAL SYSTEM 1 MG/1
1 PATCH, EXTENDED RELEASE TRANSDERMAL
Qty: 10 PATCH | Refills: 2 | Status: SHIPPED | OUTPATIENT
Start: 2017-12-27 | End: 2018-02-27

## 2017-12-27 RX ORDER — LANOLIN ALCOHOL/MO/W.PET/CERES
400 CREAM (GRAM) TOPICAL 2 TIMES DAILY
Refills: 0 | COMMUNITY
Start: 2017-12-27 | End: 2018-02-27

## 2017-12-27 NOTE — PATIENT INSTRUCTIONS
-Mg oxide 400 mg twice daily  -Scopolamine patch for upcoming  -Referral to ophthalmology, consider rehab for strabismus  -Patient to contact with any worsening symptoms or progression

## 2017-12-29 PROBLEM — H81.10 BPPV (BENIGN PAROXYSMAL POSITIONAL VERTIGO): Status: ACTIVE | Noted: 2017-12-29

## 2017-12-29 NOTE — ASSESSMENT & PLAN NOTE
-Patient with dizziness on positional changes, described as room-spinning that lasts few seconds  -BPPV likely diagnosis--resolved s/p maneuvers at home  -Counseled patient on some residual effects of BPPV that she may still be experiencing  -Patient seen by Dr. Jimenez who counseled patient on headache and visual symptoms in particular  -Patient will start Mg oxide 400 mg po bid--can cut down to qd if she is not tolerating side effects  -Patient will see ophthalmologist to work on exercises to help correct her strabismus  -Upcoming cruise for this patient, will prescribe scopolamine patch for likely exacerbation of dizziness  -Patient instructed to continue conservative management and to call the office or message me through the patient portal for any progression of symptoms or change in symptoms

## 2017-12-29 NOTE — PROGRESS NOTES
NEUROLOGY OUTPATIENT CLINIC NOTE    Patient Name:  Sofi Vidal  Patient MRN:  [id number]    HPI:  Patient is a 32 y.o. female with PMHx of strabismus in childhood who presents to [facility] 12/29/2017 for evaluation of dizziness.  Dizziness is described as room-spinning and typically occurs with changes in position and had been most notable with waking up in the morning and getting out of bed.  Patient states that the dizziness started suddenly on 12/03/17.  She cites a holiday party where she had some alcohol and assumed she just had a hangover the next day.  She noticed that she was still getting dizzy with changes in position despite rehydrating.  Denies hearing loss, tinnitus, recent infections, feeling of ear fullness, hearing wooshing/pulse sounds.  Does not feel light-headed with standing otherwise.  Patient saw her PCP and was told to try Epley maneuvers which did not seem to help.  She tried another maneuver involving taking her chin to her chest and then looking up several times and she states that this made her very dizzy, but she has not had dizziness since then.  In addition, she notes that she has had some odd sensation of peripheral vision blurriness, most notable in the R VF that is intermittent.  States that occasionally she will have bilateral spots in both eyes, described as grey spots in VF.  Also notes recent headaches that the patient has attributed to tension, they resolve with OTC Advil.  Not associated with n/v, photophobia/phonophobia, neck stiffness/pain, lacrimation/rhinorrhea/conjunctival injection.  Denies previous hx of migraines.  Patient also cites recent cosmetic botox in the forehead and pain around the temples after that for a few weeks.  Only additional concern is some mild R hand and foot numbness/tingling that started ~ 5 minutes prior to this visit and resolved by the end of the visit.  Patient attributes this to her sitting position in the waiting  "room.    ROS:  General:  No fever, no chills, no fatigue, +gain of ~10 lbs over 2 yrs  HEENT:  + headache, + changes in vision  Respiratory:  No cough, no SOB  Cardiovascular:  No chest pain, no palpitations  GI:  No abdominal pain, no n/v/c/d  :  No dysuria, no change in frequency  Skin:  No rashes, no pruritus, no wounds  Musculoskeletal:  No myalgias, no arthralgias  Hematologic:  No easy bruising or bleeding  Neuro:  No tremors, no focal weakness, no paresthesias  Psych:  No anxiety, no depression    PMHx:  Patient Active Problem List   Diagnosis    Other specified  screening(V28.89)    Cholelithiasis     PSHx:  Past Surgical History:   Procedure Laterality Date    COLPOSCOPY      LASIK       Medications:  Current Outpatient Prescriptions on File Prior to Visit   Medication Sig Dispense Refill    LACTOBACILLUS ACIDOPHILUS (PROBIOTIC ORAL) Take by mouth.      multivitamin (ONE DAILY MULTIVITAMIN) per tablet Take 1 tablet by mouth once daily.      albuterol 90 mcg/actuation inhaler Inhale 2 puffs into the lungs every 6 (six) hours as needed for Wheezing. 18 g 1    meclizine (ANTIVERT) 25 mg tablet Take 1 tablet (25 mg total) by mouth 3 (three) times daily as needed for Dizziness or Nausea. 30 tablet 0     No current facility-administered medications on file prior to visit.      Allergies:  Review of patient's allergies indicates:  No Known Allergies    Social:  Patient works at home as a .  She is  with 1 son.  Denies tobacco or illicit use ever.  Drinks socially, rarely >4 drinks at one time.    Physical Exam:  /74   Pulse 74   Ht 5' 4" (1.626 m)   Wt 64.1 kg (141 lb 5 oz)   BMI 24.26 kg/m²   General:  Well-developed, well-nourished, nad  HEENT:  NCAT, PERRL, EOMI, oropharygneal membranes non-erythematous/without exudate  Neck:  Supple, no palpable lad, normal ROM without nuchal rigidity, no apparent JVD  Respiratory:  Symmetric expansion, no increased wob  CVS: "  No LE edema.  Peripheral pulses 2+ and symmetric--radial, dorsalis pedis, posterior tibial.  GI:  Abd soft, non-distended, non-tender to palpation, +BS, no HSM  Skin:  No visible rashes or wounds  Psych:  Pleasant, cooperative with exam.  Speech and thought content appropriate.  Neurologic Exam:  Mental Status:  AAOx3.  Converses easily.  Able to spell 'world' forward and backward, able to do serial 7s appropriately.  Cranial Nerves:  PERRLA, EOMI without nystagmus.  Appears to have misalignment of the eyes at baseline, most likely related to strabismus  Visual fields intact to confrontation.  Facial movement and sensation intact and symmetric.  Tongue protrudes midline, palate raises symmetrically.  Trapezius and SCM strength 5/5 bilaterally.  Motor:  Normal muscle bulk and tone.  Strength 5/5 throughout.  No pronator drift.  Sensory:  Sensation intact to light touch at all extremities.  Vibratory sensation intact and symmetric at BUE/BLE digits.  Negative Romberg.  Reflexes:  Reflexes 2+--biceps, brachioradialis, patellar, Achilles.  Downgoing toe on Babinski.    Coordination:  No resting tremor noted, able to hold posture without tremor.  FTN, HTS, JANIS wnl--no ataxia, dysmetria, or dysdiadochokinesia.  Gait:  Appropriate gait, appropriate arm swing.  No shuffling, magnetic gait, imbalance, weakness, or foot drop noted.  Tandem gait appropriate.  [place]-Hallpike Maneuver:  Negative for R and L  HINTS Exam:  No nystagmus on EOM, Normal head impulse test, No skew deviation (per above, baseline strabismus)    Labs:  Results: CBC:   Lab Results   Component Value Date/Time    WBC 7.23 12/13/2017 12:00 PM    RBC 4.26 12/13/2017 12:00 PM    HGB 12.5 12/13/2017 12:00 PM    HCT 38.6 12/13/2017 12:00 PM     12/13/2017 12:00 PM    MCV 91 12/13/2017 12:00 PM    MCH 29.3 12/13/2017 12:00 PM    MCHC 32.4 12/13/2017 12:00 PM     CMP:   Lab Results   Component Value Date/Time    GLU 75 12/13/2017 12:00 PM    CALCIUM 9.3  12/13/2017 12:00 PM    ALBUMIN 3.8 12/13/2017 12:00 PM    PROT 7.0 12/13/2017 12:00 PM     12/13/2017 12:00 PM    K 3.7 12/13/2017 12:00 PM    CO2 28 12/13/2017 12:00 PM     12/13/2017 12:00 PM    BUN 9 12/13/2017 12:00 PM    CREATININE 0.7 12/13/2017 12:00 PM    ALKPHOS 55 12/13/2017 12:00 PM    ALT 13 12/13/2017 12:00 PM    AST 17 12/13/2017 12:00 PM    BILITOT 0.8 12/13/2017 12:00 PM     Imaging:  Imaging Results    None       Additional Diagnotic Testing:  [place]-Hallpike Maneuver:  Negative for R and L  HINTS Exam:  No nystagmus on EOM, Normal head impulse test, No skew deviation (per above, baseline strabismus)    ASSESSMENT/PLAN:  Patient is a 32 y.o. female with a PMHx of strabismus in childhood who presents to [facility] 12/29/2017 due to dizziness with additional complaints of VF deficit, visual scotomas, and R hand/foot numbness/tingling.    Problem List Items Addressed This Visit        ENT    BPPV (benign paroxysmal positional vertigo)    Overview     Seen in clinic 12/27/17         Current Assessment & Plan     -Patient with dizziness on positional changes, described as room-spinning that lasts few seconds  -BPPV likely diagnosis--resolved s/p maneuvers at home  -Counseled patient on some residual effects of BPPV that she may still be experiencing  -Patient seen by Dr. Jimenez who counseled patient on headache and visual symptoms in particular  -Patient will start Mg oxide 400 mg po bid--can cut down to qd if she is not tolerating side effects  -Patient will see ophthalmologist to work on exercises to help correct her strabismus  -Upcoming cruise for this patient, will prescribe scopolamine patch for likely exacerbation of dizziness  -Patient instructed to continue conservative management and to call the office or message me through the patient portal for any progression of symptoms or change in symptoms           Other Visit Diagnoses     Dizziness    -  Primary    Relevant Orders     Ambulatory Referral to Ophthalmology        Elizabeth Gibson MD  Pager:  [phone] [place] [address]  [phone]

## 2018-01-03 ENCOUNTER — PATIENT MESSAGE (OUTPATIENT)
Dept: NEUROLOGY | Facility: CLINIC | Age: 33
End: 2018-01-03

## 2018-01-03 DIAGNOSIS — H53.40 VISUAL FIELD LOSS: Primary | ICD-10-CM

## 2018-01-05 NOTE — TELEPHONE ENCOUNTER
Discussed ophthalmologist's opinion with concerning HINTS exam on previous visit.  Will plan for MRI/MRA.

## 2018-01-14 ENCOUNTER — PATIENT MESSAGE (OUTPATIENT)
Dept: NEUROLOGY | Facility: CLINIC | Age: 33
End: 2018-01-14

## 2018-01-15 ENCOUNTER — PATIENT MESSAGE (OUTPATIENT)
Dept: NEUROLOGY | Facility: CLINIC | Age: 33
End: 2018-01-15

## 2018-01-23 ENCOUNTER — TELEPHONE (OUTPATIENT)
Dept: NEUROLOGY | Facility: CLINIC | Age: 33
End: 2018-01-23

## 2018-01-23 ENCOUNTER — TELEPHONE (OUTPATIENT)
Dept: INTERNAL MEDICINE | Facility: CLINIC | Age: 33
End: 2018-01-23

## 2018-01-23 DIAGNOSIS — Z00.00 ANNUAL PHYSICAL EXAM: Primary | ICD-10-CM

## 2018-01-23 NOTE — TELEPHONE ENCOUNTER
----- Message from Nallely Vazquez sent at 1/23/2018  3:26 PM CST -----  Contact: [facility]/[phone]  Doctor appointment and lab have been scheduled.  Please link lab orders to the lab appointment.  Date of doctor appointment:  01/29/18  Physical or EP:  Physical  Date of lab appointment: 01/ 26/18  Comments: Patient want orders to be sent to [facility]  # [phone]. Fax # [phone]. [address] [facility].   REMINDER to appt coordinator: ## delete this from the message after reading! ##     1) The lab appointment must be at least 3 days from now to allow time for the order to be entered and linked to the appointment.   2) Lab appointment should be scheduled at least 3 days before the doctor appointment.

## 2018-01-23 NOTE — TELEPHONE ENCOUNTER
----- Message from Li Kirit sent at 1/22/2018  2:25 PM CST -----  Contact: Self- [phone]  [facility]- pt called to speak with staff- would like to get a new order to have her MRI and MRA done- would [facility] done at [facility]- please call pt back at [phone]

## 2018-01-25 ENCOUNTER — TELEPHONE (OUTPATIENT)
Dept: NEUROLOGY | Facility: CLINIC | Age: 33
End: 2018-01-25

## 2018-01-25 NOTE — TELEPHONE ENCOUNTER
Signed orders (MRA and MRI) from Dr. Gibson, demographic sheet, and referral approvals re-faxed (initially sent on 1/23/18 by Jacky JAY; confirmation page received) to [facility] # [phone]. Pt informed that information was re-faxed today.

## 2018-01-26 ENCOUNTER — PATIENT MESSAGE (OUTPATIENT)
Dept: NEUROLOGY | Facility: CLINIC | Age: 33
End: 2018-01-26

## 2018-01-27 LAB
ALBUMIN SERPL-MCNC: 4.3 G/DL (ref 3.6–5.1)
ALBUMIN/GLOB SERPL: 1.9 (CALC) (ref 1–2.5)
ALP SERPL-CCNC: 48 U/L (ref 33–115)
ALT SERPL-CCNC: 12 U/L (ref 6–29)
APPEARANCE UR: ABNORMAL
AST SERPL-CCNC: 15 U/L (ref 10–30)
BASOPHILS # BLD AUTO: 50 CELLS/UL (ref 0–200)
BASOPHILS NFR BLD AUTO: 0.9 %
BILIRUB SERPL-MCNC: 0.6 MG/DL (ref 0.2–1.2)
BILIRUB UR QL STRIP: NEGATIVE
BUN SERPL-MCNC: 11 MG/DL (ref 7–25)
BUN/CREAT SERPL: NORMAL (CALC) (ref 6–22)
CALCIUM SERPL-MCNC: 9.3 MG/DL (ref 8.6–10.2)
CHLORIDE SERPL-SCNC: 104 MMOL/L (ref 98–110)
CHOLEST SERPL-MCNC: 188 MG/DL
CHOLEST/HDLC SERPL: 2.3 (CALC)
CO2 SERPL-SCNC: 30 MMOL/L (ref 20–31)
COLOR UR: ABNORMAL
CREAT SERPL-MCNC: 0.59 MG/DL (ref 0.5–1.1)
EOSINOPHIL # BLD AUTO: 88 CELLS/UL (ref 15–500)
EOSINOPHIL NFR BLD AUTO: 1.6 %
ERYTHROCYTE [DISTWIDTH] IN BLOOD BY AUTOMATED COUNT: 11.7 % (ref 11–15)
GFR SERPL CREATININE-BSD FRML MDRD: 120 ML/MIN/1.73M2
GLOBULIN SER CALC-MCNC: 2.3 G/DL (CALC) (ref 1.9–3.7)
GLUCOSE SERPL-MCNC: 69 MG/DL (ref 65–99)
GLUCOSE UR QL STRIP: NEGATIVE
HCT VFR BLD AUTO: 37.9 % (ref 35–45)
HDLC SERPL-MCNC: 83 MG/DL
HGB BLD-MCNC: 12.5 G/DL (ref 11.7–15.5)
HGB UR QL STRIP: NEGATIVE
KETONES UR QL STRIP: NEGATIVE
LDLC SERPL CALC-MCNC: 92 MG/DL (CALC)
LEUKOCYTE ESTERASE UR QL STRIP: NEGATIVE
LYMPHOCYTES # BLD AUTO: 1909 CELLS/UL (ref 850–3900)
LYMPHOCYTES NFR BLD AUTO: 34.7 %
MCH RBC QN AUTO: 29.4 PG (ref 27–33)
MCHC RBC AUTO-ENTMCNC: 33 G/DL (ref 32–36)
MCV RBC AUTO: 89.2 FL (ref 80–100)
MONOCYTES # BLD AUTO: 347 CELLS/UL (ref 200–950)
MONOCYTES NFR BLD AUTO: 6.3 %
NEUTROPHILS # BLD AUTO: 3108 CELLS/UL (ref 1500–7800)
NEUTROPHILS NFR BLD AUTO: 56.5 %
NITRITE UR QL STRIP: NEGATIVE
NONHDLC SERPL-MCNC: 105 MG/DL (CALC)
PH UR STRIP: 8 [PH] (ref 5–8)
PLATELET # BLD AUTO: 197 THOUSAND/UL (ref 140–400)
PMV BLD REES-ECKER: 11.3 FL (ref 7.5–12.5)
POTASSIUM SERPL-SCNC: 4.1 MMOL/L (ref 3.5–5.3)
PROT SERPL-MCNC: 6.6 G/DL (ref 6.1–8.1)
PROT UR QL STRIP: ABNORMAL
RBC # BLD AUTO: 4.25 MILLION/UL (ref 3.8–5.1)
SODIUM SERPL-SCNC: 141 MMOL/L (ref 135–146)
SP GR UR STRIP: 1.02 (ref 1–1.03)
TRIGL SERPL-MCNC: 50 MG/DL
TSH SERPL-ACNC: 1.85 MIU/L
WBC # BLD AUTO: 5.5 THOUSAND/UL (ref 3.8–10.8)

## 2018-01-29 ENCOUNTER — OFFICE VISIT (OUTPATIENT)
Dept: INTERNAL MEDICINE | Facility: CLINIC | Age: 33
End: 2018-01-29
Payer: COMMERCIAL

## 2018-01-29 VITALS
HEIGHT: 64 IN | DIASTOLIC BLOOD PRESSURE: 83 MMHG | BODY MASS INDEX: 24.5 KG/M2 | HEART RATE: 83 BPM | SYSTOLIC BLOOD PRESSURE: 117 MMHG | TEMPERATURE: 98 F | RESPIRATION RATE: 18 BRPM | WEIGHT: 143.5 LBS

## 2018-01-29 DIAGNOSIS — R53.83 FATIGUE, UNSPECIFIED TYPE: ICD-10-CM

## 2018-01-29 DIAGNOSIS — M54.2 NECK PAIN ON LEFT SIDE: ICD-10-CM

## 2018-01-29 DIAGNOSIS — R59.9 SWOLLEN LYMPH NODES: ICD-10-CM

## 2018-01-29 DIAGNOSIS — J98.9 REACTIVE AIRWAY DISEASE THAT IS NOT ASTHMA: ICD-10-CM

## 2018-01-29 DIAGNOSIS — R53.1 WEAKNESS: ICD-10-CM

## 2018-01-29 DIAGNOSIS — Z00.00 ANNUAL PHYSICAL EXAM: Primary | ICD-10-CM

## 2018-01-29 DIAGNOSIS — H53.9 TRANSIENT VISION DISTURBANCE, RIGHT: ICD-10-CM

## 2018-01-29 PROCEDURE — 99999 PR PBB SHADOW E&M-EST. PATIENT-LVL III: CPT | Mod: PBBFAC,,, | Performed by: INTERNAL MEDICINE

## 2018-01-29 PROCEDURE — 99395 PREV VISIT EST AGE 18-39: CPT | Mod: S$GLB,,, | Performed by: INTERNAL MEDICINE

## 2018-01-29 RX ORDER — AMOXICILLIN AND CLAVULANATE POTASSIUM 875; 125 MG/1; MG/1
1 TABLET, FILM COATED ORAL 2 TIMES DAILY
Qty: 14 TABLET | Refills: 0 | Status: SHIPPED | OUTPATIENT
Start: 2018-01-29 | End: 2018-02-05

## 2018-01-29 RX ORDER — ALBUTEROL SULFATE 90 UG/1
2 AEROSOL, METERED RESPIRATORY (INHALATION) EVERY 6 HOURS PRN
Qty: 18 G | Refills: 1 | Status: SHIPPED | OUTPATIENT
Start: 2018-01-29 | End: 2018-02-27

## 2018-01-29 NOTE — PROGRESS NOTES
Subjective:      Sofi Vidal is a 32 y.o. female who presents for annual exam.    Family History:  family history includes Breast cancer (age of onset: 68) in her maternal grandmother; Cancer in her maternal grandmother.    Health Maintenance:  Health Maintenance       Date Due Completion Date    Influenza Vaccine 08/01/2017 1/4/2017    Pap Smear with HPV Cotest 01/03/2020 1/3/2017    TETANUS VACCINE 11/17/2024 11/17/2014        Eye exam: recent eye evaluation was normal, [facility]  Dental Exam: no issues  OB/GYN: 3/2017  Last Pap: 1/2017  Tdap: 11/2014    Exercise: does not follow regular exercise regimen  Diet: healthy, weakness if she does not eat at regular intervals  Body mass index is 24.64 kg/m².    Meds:   Current Outpatient Prescriptions:     LACTOBACILLUS ACIDOPHILUS (PROBIOTIC ORAL), Take by mouth., Disp: , Rfl:     multivitamin (ONE DAILY MULTIVITAMIN) per tablet, Take 1 tablet by mouth once daily., Disp: , Rfl:     albuterol 90 mcg/actuation inhaler, Inhale 2 puffs into the lungs every 6 (six) hours as needed for Wheezing., Disp: 18 g, Rfl: 1    amoxicillin-clavulanate 875-125mg (AUGMENTIN) 875-125 mg per tablet, Take 1 tablet by mouth 2 (two) times daily., Disp: 14 tablet, Rfl: 0    magnesium oxide (MAG-OX) 400 mg tablet, Take 1 tablet (400 mg total) by mouth 2 (two) times daily., Disp: , Rfl: 0    meclizine (ANTIVERT) 25 mg tablet, Take 1 tablet (25 mg total) by mouth 3 (three) times daily as needed for Dizziness or Nausea., Disp: 30 tablet, Rfl: 0    scopolamine (TRANSDERM-SCOP) 1.3-1.5 mg (1 mg over 3 days), Place 1 patch onto the skin every 72 hours., Disp: 10 patch, Rfl: 2    PMHx:   Past Medical History:   Diagnosis Date    Abnormal cervical Papanicolaou smear 2011, 11-19-12    Colposcopy , HONEY 1    Childhood asthma     Cholelithiasis complicating pregnancy, antepartum        PSHx:     Past Surgical History:   Procedure Laterality Date    COLPOSCOPY      LASIK          SocHx:   Social History     Social History    Marital status:      Spouse name: N/A    Number of children: N/A    Years of education: N/A     Social History Main Topics    Smoking status: Never Smoker    Smokeless tobacco: Never Used    Alcohol use Yes      Comment: 2 drinks per week    Drug use: No    Sexual activity: Yes     Partners: Male     Birth control/ protection: IUD      Comment: mirena 2/10/17     Other Topics Concern    None     Social History Narrative     recently  of cancer, has a 2 year old child       Review of Systems   Constitutional: Positive for fatigue (in last few months) and unexpected weight change (+ 12 lbs, hungrier, more often). Negative for activity change, chills and fever.   HENT: Negative for congestion, ear discharge, hearing loss, postnasal drip, rhinorrhea, sinus pressure, sneezing and trouble swallowing.    Eyes: Positive for visual disturbance (right eye greyish tunnel-like vision, improving). Negative for discharge and redness.   Respiratory: Negative for cough, chest tightness, shortness of breath and wheezing.         Occasions of chest tightness with URI's, no recent episodes   Cardiovascular: Negative for chest pain, palpitations and leg swelling.   Gastrointestinal: Negative for blood in stool, constipation, diarrhea and vomiting.   Endocrine: Positive for polyphagia. Negative for polydipsia and polyuria.   Genitourinary: Negative for difficulty urinating, dysuria, hematuria and menstrual problem.   Musculoskeletal: Positive for myalgias (episode of left thigh and calf muscle pain and weakness, no specific triggers) and neck pain (left sided neck tenderness). Negative for arthralgias and joint swelling.   Skin: Negative for rash.   Neurological: Positive for weakness (leg weakness, feels out of shape) and light-headedness (if she does not eat at regular intervals). Negative for dizziness, numbness and headaches.   Hematological: Negative for  adenopathy.   Psychiatric/Behavioral: Positive for decreased concentration (in last few months) and sleep disturbance (melatonin helped x1. getting ). Negative for confusion and dysphoric mood. The patient is not nervous/anxious.          Answers for HPI/ROS submitted by the patient on 1/28/2018   activity change: No  unexpected weight change: Yes  neck pain: Yes  hearing loss: No  rhinorrhea: No  trouble swallowing: No  eye discharge: No  chest tightness: No  wheezing: No  chest pain: No  palpitations: No  blood in stool: No  constipation: No  vomiting: No  diarrhea: No  polydipsia: No  polyuria: No  difficulty urinating: No  hematuria: No  menstrual problem: No  dysuria: No  joint swelling: No  arthralgias: No  headaches: No  weakness: Yes  confusion: No  dysphoric mood: No      Objective:      Physical Exam   Constitutional: She is oriented to person, place, and time. Vital signs are normal. She appears well-developed and well-nourished. No distress.   HENT:   Head: Normocephalic and atraumatic.   Right Ear: Hearing, tympanic membrane, external ear and ear canal normal. Tympanic membrane is not erythematous and not bulging.   Left Ear: Hearing, tympanic membrane, external ear and ear canal normal. Tympanic membrane is not erythematous and not bulging.   Nose: Nose normal. No rhinorrhea. Right sinus exhibits no maxillary sinus tenderness and no frontal sinus tenderness. Left sinus exhibits no maxillary sinus tenderness and no frontal sinus tenderness.   Mouth/Throat: Uvula is midline, oropharynx is clear and moist and mucous membranes are normal. No oral lesions. No dental caries. No oropharyngeal exudate or posterior oropharyngeal erythema.   Eyes: Conjunctivae, EOM and lids are normal. Pupils are equal, round, and reactive to light. No scleral icterus.   Neck: Normal range of motion. Neck supple. No spinous process tenderness and no muscular tenderness present. Normal range of motion present. No thyroid mass and  no thyromegaly present.   Cardiovascular: Normal rate, regular rhythm, normal heart sounds and intact distal pulses.    No murmur heard.  Pulmonary/Chest: Effort normal and breath sounds normal. She has no wheezes.   Abdominal: Soft. Bowel sounds are normal. She exhibits no distension. There is tenderness in the right upper quadrant. There is no rigidity, no rebound and no guarding.   Mild RUQ tenderness   Musculoskeletal: Normal range of motion. She exhibits no edema.        Left shoulder: She exhibits normal range of motion and no bony tenderness.        Cervical back: She exhibits normal range of motion, no bony tenderness, no pain and no spasm.   Lymphadenopathy:     She has cervical adenopathy.        Right cervical: Superficial cervical adenopathy present.        Left cervical: No superficial cervical adenopathy present.        Right: No supraclavicular adenopathy present.        Left: No supraclavicular adenopathy present.   Neurological: She is alert and oriented to person, place, and time. She has normal strength and normal reflexes. Coordination and gait normal.   Skin: Skin is warm, dry and intact. No rash noted. She is not diaphoretic.   Psychiatric: She has a normal mood and affect.   Vitals reviewed.      Assessment:       1. Annual physical exam    2. Reactive airway disease that is not asthma    3. Neck pain on left side    4. Swollen lymph nodes    5. Transient vision disturbance, right    6. Fatigue, unspecified type    7. Weakness        Plan:       1. Annual physical exam  - reviewed labs with patient  - ua abnormal but no symptoms of infection    2. Reactive airway disease  - albuterol 90 mcg/actuation inhaler; Inhale 2 puffs into the lungs every 6 (six) hours as needed for Wheezing.  Dispense: 18 g; Refill: 1  - to be used as needed for episodes related to uri's    3. Neck pain on left side  - no signs of msk pain    4. Swollen lymph nodes  - amoxicillin-clavulanate 875-125mg (AUGMENTIN)  875-125 mg per tablet; Take 1 tablet by mouth 2 (two) times daily.  Dispense: 14 tablet; Refill: 0  - call if no resolution    5. Transient vision disturbance, right  - evaluated by ophthalmology  - patient to follow up with Neurology as I was unable to open her mri files    6. Fatigue, unspecified type  - Sedimentation rate, automated; Future  - C-reactive protein; Future  - CHRIS; Future    7. Weakness  - Sedimentation rate, automated; Future  - C-reactive protein; Future  - CHRIS; Future    RTC in 3 months or sooner if needed    Cheri June MD

## 2018-01-30 ENCOUNTER — PATIENT MESSAGE (OUTPATIENT)
Dept: NEUROLOGY | Facility: CLINIC | Age: 33
End: 2018-01-30

## 2018-01-30 ENCOUNTER — PATIENT MESSAGE (OUTPATIENT)
Dept: INTERNAL MEDICINE | Facility: CLINIC | Age: 33
End: 2018-01-30

## 2018-01-30 NOTE — TELEPHONE ENCOUNTER
Patient had several concerns post visit she wanted to discuss with Dr June. Message sent to Dr June

## 2018-01-31 ENCOUNTER — PATIENT MESSAGE (OUTPATIENT)
Dept: INTERNAL MEDICINE | Facility: CLINIC | Age: 33
End: 2018-01-31

## 2018-01-31 LAB
ANA SER QL IF: NEGATIVE
CRP SERPL-MCNC: 1.9 MG/L
ERYTHROCYTE [SEDIMENTATION RATE] IN BLOOD BY WESTERGREN METHOD: 11 MM/H

## 2018-02-01 ENCOUNTER — TELEPHONE (OUTPATIENT)
Dept: INTERNAL MEDICINE | Facility: CLINIC | Age: 33
End: 2018-02-01

## 2018-02-01 NOTE — TELEPHONE ENCOUNTER
----- Message from Cheri June MD sent at 1/31/2018  8:26 PM CST -----  Message sent via patient portal..

## 2018-02-05 ENCOUNTER — OFFICE VISIT (OUTPATIENT)
Dept: INTERNAL MEDICINE | Facility: CLINIC | Age: 33
End: 2018-02-05
Payer: COMMERCIAL

## 2018-02-05 VITALS
SYSTOLIC BLOOD PRESSURE: 100 MMHG | HEART RATE: 92 BPM | DIASTOLIC BLOOD PRESSURE: 72 MMHG | WEIGHT: 143.06 LBS | HEIGHT: 64 IN | TEMPERATURE: 99 F | BODY MASS INDEX: 24.42 KG/M2 | RESPIRATION RATE: 18 BRPM

## 2018-02-05 DIAGNOSIS — M79.622 LEFT AXILLARY PAIN: ICD-10-CM

## 2018-02-05 DIAGNOSIS — F41.9 ANXIETY: ICD-10-CM

## 2018-02-05 DIAGNOSIS — R09.89 LYMPH NODE SYMPTOM: ICD-10-CM

## 2018-02-05 DIAGNOSIS — M54.2 NECK PAIN ON LEFT SIDE: Primary | ICD-10-CM

## 2018-02-05 DIAGNOSIS — R41.840 CONCENTRATION DEFICIT: ICD-10-CM

## 2018-02-05 DIAGNOSIS — H53.9 VISUAL DISTURBANCE: ICD-10-CM

## 2018-02-05 PROCEDURE — 99999 PR PBB SHADOW E&M-EST. PATIENT-LVL V: CPT | Mod: PBBFAC,,, | Performed by: INTERNAL MEDICINE

## 2018-02-05 PROCEDURE — 99214 OFFICE O/P EST MOD 30 MIN: CPT | Mod: S$GLB,,, | Performed by: INTERNAL MEDICINE

## 2018-02-05 PROCEDURE — 3008F BODY MASS INDEX DOCD: CPT | Mod: S$GLB,,, | Performed by: INTERNAL MEDICINE

## 2018-02-05 NOTE — PROGRESS NOTES
Subjective:       Patient ID: Sofi Vidal is a 32 y.o. female who presents for Follow-up; Neck Pain (left side); Axillary Swelling (left); Anxiety; Groin Swelling (left); and Eye Problem      Neck Pain    This is a new problem. The current episode started in the past 7 days. The problem occurs intermittently. The problem has been waxing and waning. The pain is associated with nothing. The pain is present in the left side. The quality of the pain is described as aching. Nothing aggravates the symptoms. The pain is same all the time. Pertinent negatives include no chest pain, fever, headaches, numbness, pain with swallowing, trouble swallowing or weakness. She has tried nothing for the symptoms.   Anxiety   Presents for initial visit. Onset was 1 to 6 months ago. Symptoms include decreased concentration (when trying to complete tasks related to her work) and nervous/anxious behavior. Patient reports no chest pain, depressed mood, irritability, muscle tension, nausea, palpitations, panic, restlessness or shortness of breath. Symptoms occur occasionally. The severity of symptoms is moderate. The symptoms are aggravated by family issues. The quality of sleep is fair.     There is no history of anxiety/panic attacks, depression or hyperthyroidism. Past treatments include counseling (CBT) (has spoken with grief counselor).      Initially palpated tenderness overlying deeper anterior cervical LN, did not feel significantly enlarged, reported some ear discomfort. Will complete antibiotic tomorrow. A few days later, notice a lump left chest wall anterior axillary line and left groin tenderness and possibly lymph node enlargement. Denies recent illnesses, possible mono contact a few weeks ago but denies fever or sore throat. No recent travel out out of the country. No hunting ot hiking. Travels to [place], [place] and [place] but denies tick bites.      Review of Systems   Constitutional: Positive for diaphoresis  (reports night sweats) and unexpected weight change (weight gain of 10 lbs in last year, recently increased physical activity). Negative for chills, fatigue, fever and irritability.        + malaise   HENT: Positive for postnasal drip and sore throat (but reports throat was scratchy). Negative for congestion, ear pain, sinus pressure and trouble swallowing.    Eyes: Positive for visual disturbance (improving and has not noticed it during her trip). Negative for redness.   Respiratory: Negative for cough, chest tightness and shortness of breath.    Cardiovascular: Negative for chest pain, palpitations and leg swelling.   Gastrointestinal: Negative for abdominal pain, constipation, diarrhea, nausea and vomiting.   Musculoskeletal: Positive for neck pain. Negative for back pain.   Skin: Negative for rash.   Neurological: Negative for weakness, numbness and headaches.   Hematological: Positive for adenopathy (reports enlarged left neck, left axillary and left groin enlargement in last few days).   Psychiatric/Behavioral: Positive for decreased concentration (when trying to complete tasks related to her work). Negative for dysphoric mood. The patient is nervous/anxious.        Objective:      Physical Exam   Constitutional: She is oriented to person, place, and time. Vital signs are normal. She appears well-developed and well-nourished. No distress.   HENT:   Head: Normocephalic and atraumatic.   Right Ear: Hearing, tympanic membrane, external ear and ear canal normal. Tympanic membrane is not erythematous.   Left Ear: Hearing, tympanic membrane, external ear and ear canal normal. Tympanic membrane is not erythematous and not bulging.   Nose: Nose normal.   Mouth/Throat: Uvula is midline and mucous membranes are normal. No oral lesions. Normal dentition. No oropharyngeal exudate or posterior oropharyngeal erythema.   Eyes: Conjunctivae and lids are normal.   Neck: Full passive range of motion without pain.    Cardiovascular: Normal rate, regular rhythm, normal heart sounds and intact distal pulses.    No murmur heard.  Pulmonary/Chest: Effort normal and breath sounds normal. She has no wheezes.   Abdominal: Soft. Bowel sounds are normal. She exhibits no distension. There is no tenderness.   Musculoskeletal: Normal range of motion. She exhibits no edema.   Lymphadenopathy:        Head (right side): No preauricular and no posterior auricular adenopathy present.        Head (left side): No submandibular (with tenderness to deep palpation, not enlarged), no preauricular and no posterior auricular adenopathy present.     She has no axillary adenopathy (tenderness to palpation, but not enlarged).        Right: No inguinal adenopathy present.        Left: No inguinal adenopathy present.   Neurological: She is alert and oriented to person, place, and time.   Skin: Skin is warm, dry and intact. No rash noted. She is not diaphoretic.   Psychiatric: She has a normal mood and affect.   But very tearful during exam when taking about stresses in life, has been seeing a grief therapist   Vitals reviewed.      Assessment:       1. Neck pain on left side    2. Left axillary pain    3. Lymph node symptom    4. Anxiety    5. Concentration deficit    6. Visual disturbance        Plan:       1. Neck pain on left side  - US Soft Tissue Misc; Future for left neck US, to be done at [facility]    2. Left axillary pain  - US Soft Tissue Misc; Future     3. Lymph node symptom  - HETEROPHILE AB SCREEN; Future  - HIV-1 and HIV-2 antibodies; Future  - RPR    4. Anxiety  - Psych to discuss options for evaluation of anxiety vs stress and grief reaction and to evaluate for ADHD  - Ambulatory Referral to Psychiatry    5. Concentration deficit  - Ambulatory Referral to Psychiatry    6. Visual Disturbance, resolving   - per imaging reports, MRA normal and MRI showed small change but not suggestive of demyelinating lesion, no other abnormalities    Cheri  MD [place]

## 2018-02-06 LAB
HETEROPH AB SER QL LA: NEGATIVE
HIV 1+2 AB+HIV1 P24 AG SERPL QL IA: NORMAL
RPR SER QL: NORMAL

## 2018-02-07 ENCOUNTER — TELEPHONE (OUTPATIENT)
Dept: OBSTETRICS AND GYNECOLOGY | Facility: CLINIC | Age: 33
End: 2018-02-07

## 2018-02-07 ENCOUNTER — PATIENT MESSAGE (OUTPATIENT)
Dept: INTERNAL MEDICINE | Facility: CLINIC | Age: 33
End: 2018-02-07

## 2018-02-07 NOTE — TELEPHONE ENCOUNTER
----- Message from Danielle Espino sent at 2/7/2018 10:21 AM CST -----  Contact: self  x_  1st Request  _  2nd Request  _  3rd Request    Who: pt    Why: pt calling in regards to removing Merina.. please advise    What Number to Call Back: [phone]    When to Expect a call back: (Before the end of the day)   -- if call after 3:00 call back will be tomorrow.

## 2018-02-19 ENCOUNTER — TELEPHONE (OUTPATIENT)
Dept: INTERNAL MEDICINE | Facility: CLINIC | Age: 33
End: 2018-02-19

## 2018-02-19 NOTE — TELEPHONE ENCOUNTER
----- Message from Sobeida Williamson sent at 2/19/2018 10:41 AM CST -----  Contact: pt [phone]  Patient is returning a phone call.  Who left a message for the patient: Miesha   Does patient know what this is regarding:  Call from the nurse.  Comments:

## 2018-02-20 ENCOUNTER — PATIENT MESSAGE (OUTPATIENT)
Dept: INTERNAL MEDICINE | Facility: CLINIC | Age: 33
End: 2018-02-20

## 2018-02-27 ENCOUNTER — OFFICE VISIT (OUTPATIENT)
Dept: OBSTETRICS AND GYNECOLOGY | Facility: CLINIC | Age: 33
End: 2018-02-27
Payer: COMMERCIAL

## 2018-02-27 VITALS
DIASTOLIC BLOOD PRESSURE: 70 MMHG | BODY MASS INDEX: 24.33 KG/M2 | SYSTOLIC BLOOD PRESSURE: 90 MMHG | WEIGHT: 142.5 LBS | HEIGHT: 64 IN

## 2018-02-27 DIAGNOSIS — R63.5 WEIGHT GAIN: ICD-10-CM

## 2018-02-27 DIAGNOSIS — Z01.419 ENCOUNTER FOR GYNECOLOGICAL EXAMINATION WITHOUT ABNORMAL FINDING: Primary | ICD-10-CM

## 2018-02-27 DIAGNOSIS — Z30.431 IUD CHECK UP: ICD-10-CM

## 2018-02-27 PROCEDURE — 99395 PREV VISIT EST AGE 18-39: CPT | Mod: S$GLB,,, | Performed by: OBSTETRICS & GYNECOLOGY

## 2018-02-27 PROCEDURE — 99999 PR PBB SHADOW E&M-EST. PATIENT-LVL III: CPT | Mod: PBBFAC,,, | Performed by: OBSTETRICS & GYNECOLOGY

## 2018-02-27 NOTE — PROGRESS NOTES
"CC: Well woman exam    Sofi Vidal is a 32 y.o. female  presents for a well woman exam.  She has some weight gain and has concerns that the MIRENA IUD may be causing some of  Her sx.       Past Medical History:   Diagnosis Date    Abnormal cervical Papanicolaou smear 12    Colposcopy , HONEY 1    Childhood asthma     Cholelithiasis complicating pregnancy, antepartum        Past Surgical History:   Procedure Laterality Date    COLPOSCOPY      LASIK         OB History    Para Term  AB Living   1 1 0 0 0 1   SAB TAB Ectopic Multiple Live Births   0 0 0 0        # Outcome Date GA Lbr Mahin/2nd Weight Sex Delivery Anes PTL Lv   1 Para                   Family History   Problem Relation Age of Onset    Breast cancer Maternal Grandmother 68    Cancer Maternal Grandmother     Colon cancer Neg Hx     Ovarian cancer Neg Hx        Social History   Substance Use Topics    Smoking status: Never Smoker    Smokeless tobacco: Never Used    Alcohol use Yes      Comment: 2 drinks per week       BP 90/70 (BP Location: Left arm, Patient Position: Sitting, BP Method: Small (Manual))   Ht 5' 4" (1.626 m)   Wt 64.7 kg (142 lb 8.4 oz)   LMP 2018 (Exact Date)   BMI 24.46 kg/m²     ROS:  GENERAL: Denies weight gain or weight loss. Feeling well overall.   SKIN: Denies rash or lesions.   HEAD: Denies head injury or headache.   NODES: Denies enlarged lymph nodes.   CHEST: Denies chest pain or shortness of breath.   CARDIOVASCULAR: Denies palpitations or left sided chest pain.   ABDOMEN: No abdominal pain, constipation, diarrhea, nausea, vomiting or rectal bleeding.   URINARY: No frequency, dysuria, hematuria, or burning on urination.  REPRODUCTIVE: See HPI.   BREASTS: The patient performs breast self-examination and denies pain, lumps, or nipple discharge.   HEMATOLOGIC: No easy bruisability or excessive bleeding.  MUSCULOSKELETAL: Denies joint pain or swelling.   NEUROLOGIC: Denies " syncope or weakness.   PSYCHIATRIC: Denies depression, anxiety or mood swings.    Physical Exam:    APPEARANCE: Well nourished, well developed, in no acute distress.  AFFECT: WNL, alert and oriented x 3  SKIN: No acne or hirsutism  NECK: Neck symmetric without masses or thyromegaly  NODES: No inguinal, cervical, axillary, or femoral lymph node enlargement  CHEST: Good respiratory effect  ABDOMEN: Soft.  No tenderness or masses.  No hepatosplenomegaly.  No hernias.  BREASTS: Symmetrical, no skin changes or visible lesions.  No palpable masses, nipple discharge bilaterally.  PELVIC: Normal external genitalia without lesions.  Normal hair distribution.  Adequate perineal body, normal urethral meatus.  Vagina moist and well rugated without lesions or discharge.  Cervix pink, IUD string at os,  without lesions, discharge or tenderness.  No significant cystocele or rectocele.  Bimanual exam shows uterus to be normal size, regular, mobile and nontender.  Adnexa without masses or tenderness.    EXTREMITIES: No edema.      ASSESSMENT AND PLAN  1. Encounter for gynecological examination without abnormal finding     2. IUD check up     3. Weight gain       WIll work on exercise and diet  Health maintenance  MAY REMOVE THE MIRENA IUD    Patient was counseled today on A.C.S. Pap guidelines and recommendations for yearly pelvic exams, mammograms and monthly self breast exams; to see her PCP for other health maintenance.     F/U PRN

## 2018-03-27 ENCOUNTER — TELEPHONE (OUTPATIENT)
Dept: OBSTETRICS AND GYNECOLOGY | Facility: CLINIC | Age: 33
End: 2018-03-27

## 2018-04-13 ENCOUNTER — OFFICE VISIT (OUTPATIENT)
Dept: OBSTETRICS AND GYNECOLOGY | Facility: CLINIC | Age: 33
End: 2018-04-13
Payer: COMMERCIAL

## 2018-04-13 VITALS
HEIGHT: 64 IN | SYSTOLIC BLOOD PRESSURE: 100 MMHG | WEIGHT: 142 LBS | DIASTOLIC BLOOD PRESSURE: 70 MMHG | BODY MASS INDEX: 24.24 KG/M2

## 2018-04-13 DIAGNOSIS — Z30.9 ENCOUNTER FOR CONTRACEPTIVE MANAGEMENT, UNSPECIFIED TYPE: Primary | ICD-10-CM

## 2018-04-13 PROCEDURE — 99999 PR PBB SHADOW E&M-EST. PATIENT-LVL III: CPT | Mod: PBBFAC,,, | Performed by: OBSTETRICS & GYNECOLOGY

## 2018-04-13 PROCEDURE — 58301 REMOVE INTRAUTERINE DEVICE: CPT | Mod: S$GLB,,, | Performed by: OBSTETRICS & GYNECOLOGY

## 2018-04-13 PROCEDURE — 99213 OFFICE O/P EST LOW 20 MIN: CPT | Mod: 25,S$GLB,, | Performed by: OBSTETRICS & GYNECOLOGY

## 2018-04-13 NOTE — PROGRESS NOTES
"CC: IUD removal    Sofi Vidal is a 32 y.o. female  presents for an IUD removal. She does not like the weight gain      Past Medical History:   Diagnosis Date    Abnormal cervical Papanicolaou smear , 12    Colposcopy , HONEY 1    Childhood asthma     Cholelithiasis complicating pregnancy, antepartum        Past Surgical History:   Procedure Laterality Date    COLPOSCOPY      LASIK         OB History    Para Term  AB Living   1 1 0 0 0 1   SAB TAB Ectopic Multiple Live Births   0 0 0 0        # Outcome Date GA Lbr Mahin/2nd Weight Sex Delivery Anes PTL Lv   1 Para                   Family History   Problem Relation Age of Onset    Breast cancer Maternal Grandmother 68    Cancer Maternal Grandmother     Colon cancer Neg Hx     Ovarian cancer Neg Hx        Social History   Substance Use Topics    Smoking status: Never Smoker    Smokeless tobacco: Never Used    Alcohol use Yes      Comment: 2 drinks per week       /70 (BP Location: Right arm, Patient Position: Sitting, BP Method: Small (Manual))   Ht 5' 4" (1.626 m)   Wt 64.4 kg (141 lb 15.6 oz)   BMI 24.37 kg/m²     ROS:  GENERAL: Denies weight gain or weight loss. Feeling well overall.   SKIN: Denies rash or lesions.   HEAD: Denies head injury or headache.   NODES: Denies enlarged lymph nodes.   CHEST: Denies chest pain or shortness of breath.   CARDIOVASCULAR: Denies palpitations or left sided chest pain.   ABDOMEN: No abdominal pain, constipation, diarrhea, nausea, vomiting or rectal bleeding.   URINARY: No frequency, dysuria, hematuria, or burning on urination.  REPRODUCTIVE: See HPI.   BREASTS: The patient performs breast self-examination and denies pain, lumps, or nipple discharge.   HEMATOLOGIC: No easy bruisability or excessive bleeding.  MUSCULOSKELETAL: Denies joint pain or swelling.   NEUROLOGIC: Denies syncope or weakness.   PSYCHIATRIC: Denies depression, anxiety or mood swings.    Physical " Exam:    APPEARANCE: Well nourished, well developed, in no acute distress.  AFFECT: WNL, alert and oriented x 3  SKIN: No acne or hirsutism  NECK: Neck symmetric without masses or thyromegaly  NODES: No inguinal, cervical, axillary, or femoral lymph node enlargement  CHEST: Good respiratory effect  ABDOMEN: Soft.  No tenderness or masses.  No hepatosplenomegaly.  No hernias.  PELVIC: Normal external genitalia without lesions.  Normal hair distribution.  Adequate perineal body, normal urethral meatus.  Vagina moist and well rugated without lesions or discharge.  Cervix pink, IUD string in place, REMOVED with ring forceps, without lesions, discharge or tenderness.  No significant cystocele or rectocele.  Bimanual exam shows uterus to be normal size, regular, mobile and nontender.  Adnexa without masses or tenderness.    EXTREMITIES: No edema.    ASSESSMENT AND PLAN  1. Encounter for contraceptive management, unspecified type  Removal of Intrauterine Device-Today       Patient was counseled today on birth control options    F/U PRN

## 2018-05-04 ENCOUNTER — OFFICE VISIT (OUTPATIENT)
Dept: INTERNAL MEDICINE | Facility: CLINIC | Age: 33
End: 2018-05-04
Payer: COMMERCIAL

## 2018-05-04 VITALS
SYSTOLIC BLOOD PRESSURE: 108 MMHG | HEIGHT: 64 IN | WEIGHT: 141.56 LBS | HEART RATE: 85 BPM | BODY MASS INDEX: 24.17 KG/M2 | OXYGEN SATURATION: 99 % | TEMPERATURE: 98 F | DIASTOLIC BLOOD PRESSURE: 64 MMHG | RESPIRATION RATE: 16 BRPM

## 2018-05-04 DIAGNOSIS — J20.8 ACUTE BRONCHITIS, VIRAL: Primary | ICD-10-CM

## 2018-05-04 DIAGNOSIS — R05.8 NONPRODUCTIVE COUGH: ICD-10-CM

## 2018-05-04 PROCEDURE — 99213 OFFICE O/P EST LOW 20 MIN: CPT | Mod: S$GLB,,, | Performed by: INTERNAL MEDICINE

## 2018-05-04 PROCEDURE — 3008F BODY MASS INDEX DOCD: CPT | Mod: CPTII,S$GLB,, | Performed by: INTERNAL MEDICINE

## 2018-05-04 PROCEDURE — 99999 PR PBB SHADOW E&M-EST. PATIENT-LVL IV: CPT | Mod: PBBFAC,,, | Performed by: INTERNAL MEDICINE

## 2018-05-04 RX ORDER — BENZONATATE 200 MG/1
200 CAPSULE ORAL 3 TIMES DAILY PRN
Qty: 30 CAPSULE | Refills: 0 | Status: SHIPPED | OUTPATIENT
Start: 2018-05-04 | End: 2018-05-14

## 2018-05-04 RX ORDER — CODEINE PHOSPHATE AND GUAIFENESIN 10; 100 MG/5ML; MG/5ML
5 SOLUTION ORAL NIGHTLY PRN
Qty: 118 ML | Refills: 0 | Status: SHIPPED | OUTPATIENT
Start: 2018-05-04 | End: 2018-05-14

## 2018-05-04 RX ORDER — GUAIFENESIN 600 MG/1
1200 TABLET, EXTENDED RELEASE ORAL 2 TIMES DAILY
COMMUNITY
End: 2019-08-15

## 2018-05-04 NOTE — PROGRESS NOTES
Subjective:       Patient ID: Sofi Vidal is a 32 y.o. female who presents for Chest Pain (pt reports a tightness in lungs; deep, full breaths makes for uncomfortable breathing )      Cough   This is a new problem. The current episode started in the past 7 days. The problem has been unchanged. The problem occurs every few minutes. The cough is non-productive. Associated symptoms include shortness of breath (chest discomfort with breathing). Pertinent negatives include no chest pain, chills, ear pain, eye redness, fever, headaches, heartburn, myalgias, nasal congestion, postnasal drip, rash, sore throat (improved), sweats or wheezing. The symptoms are aggravated by lying down. She has tried a beta-agonist inhaler for the symptoms. The treatment provided no relief. Her past medical history is significant for bronchitis. There is no history of asthma.      Symptoms have not impaired her ability to work out.       Review of Systems   Constitutional: Negative for chills, diaphoresis and fever.   HENT: Negative for congestion, ear discharge, ear pain, postnasal drip, sinus pressure and sore throat (improved).         Hoarseness has improved   Eyes: Negative for redness and visual disturbance.   Respiratory: Positive for cough, chest tightness (right sided chest wall pain) and shortness of breath (chest discomfort with breathing). Negative for wheezing.    Cardiovascular: Negative for chest pain, palpitations and leg swelling.   Gastrointestinal: Negative for abdominal pain, constipation, heartburn, nausea and vomiting.        Pulsation left upper quadrant abdomen that she feels when she is exercising regularly   Genitourinary: Negative for decreased urine volume and dysuria.   Musculoskeletal: Negative for arthralgias and myalgias.   Skin: Negative for rash.   Neurological: Negative for dizziness, light-headedness and headaches.   Hematological: Negative for adenopathy (swollen lymph nodes resolved).       Objective:       Physical Exam   Constitutional: She is oriented to person, place, and time. Vital signs are normal. She appears well-developed and well-nourished. No distress.   HENT:   Head: Normocephalic and atraumatic.   Right Ear: Hearing and external ear normal.   Left Ear: Hearing and external ear normal.   Nose: Nose normal.   Mouth/Throat: Uvula is midline and mucous membranes are normal.   Eyes: Lids are normal.   Neck: Full passive range of motion without pain.   Cardiovascular: Normal rate, regular rhythm, normal heart sounds and intact distal pulses.    No murmur heard.  Pulmonary/Chest: Effort normal and breath sounds normal. No tachypnea and no bradypnea. She has no decreased breath sounds. She has no wheezes. She has no rhonchi.   Abdominal: Soft. Bowel sounds are normal. She exhibits no distension. There is no tenderness. There is no rigidity, no rebound and no guarding.   Musculoskeletal: Normal range of motion. She exhibits no edema.   Neurological: She is alert and oriented to person, place, and time.   Skin: Skin is warm, dry and intact. No rash noted. She is not diaphoretic.   Psychiatric: She has a normal mood and affect.   Vitals reviewed.      Assessment:       1. Acute bronchitis, viral    2. Nonproductive cough        Plan:       1. Acute bronchitis, viral  - likely persistent cough after recent URI  - guaiFENesin (MUCINEX) 600 mg 12 hr tablet; Take 1,200 mg by mouth 2 (two) times daily.  - benzonatate (TESSALON) 200 MG capsule; Take 1 capsule (200 mg total) by mouth 3 (three) times daily as needed.  Dispense: 30 capsule; Refill: 0  - guaifenesin-codeine 100-10 mg/5 ml (TUSSI-ORGANIDIN NR)  mg/5 mL syrup; Take 5 mLs by mouth nightly as needed.  Dispense: 118 mL; Refill: 0    2. Nonproductive cough  - guaiFENesin (MUCINEX) 600 mg 12 hr tablet; Take 1,200 mg by mouth 2 (two) times daily.  - benzonatate (TESSALON) 200 MG capsule; Take 1 capsule (200 mg total) by mouth 3 (three) times daily as  needed.  Dispense: 30 capsule; Refill: 0  - guaifenesin-codeine 100-10 mg/5 ml (TUSSI-ORGANIDIN NR)  mg/5 mL syrup; Take 5 mLs by mouth nightly as needed.  Dispense: 118 mL; Refill: 0    Call if no improvement in symptoms    Cheri June MD

## 2018-05-18 ENCOUNTER — PATIENT MESSAGE (OUTPATIENT)
Dept: INTERNAL MEDICINE | Facility: CLINIC | Age: 33
End: 2018-05-18

## 2018-12-26 ENCOUNTER — OFFICE VISIT (OUTPATIENT)
Dept: OPTOMETRY | Facility: CLINIC | Age: 33
End: 2018-12-26
Payer: COMMERCIAL

## 2018-12-26 DIAGNOSIS — H00.15 CHALAZION OF LEFT LOWER EYELID: Primary | ICD-10-CM

## 2018-12-26 PROCEDURE — 92002 INTRM OPH EXAM NEW PATIENT: CPT | Mod: S$GLB,,, | Performed by: OPTOMETRIST

## 2018-12-26 PROCEDURE — 99999 PR PBB SHADOW E&M-EST. PATIENT-LVL II: CPT | Mod: PBBFAC,,, | Performed by: OPTOMETRIST

## 2018-12-26 RX ORDER — NEOMYCIN SULFATE, POLYMYXIN B SULFATE AND DEXAMETHASONE 3.5; 10000; 1 MG/ML; [USP'U]/ML; MG/ML
1 SUSPENSION/ DROPS OPHTHALMIC 4 TIMES DAILY
Qty: 5 ML | Refills: 0 | Status: SHIPPED | OUTPATIENT
Start: 2018-12-26 | End: 2019-01-05

## 2018-12-26 NOTE — PROGRESS NOTES
"HPI     33yr old female present for Urgent Care visit. Patient complain of   possible Chalazion LLL x 1 week, worse in the past few days. Patient   having eye pain and "whiteish"discharge in the corner of the eye in the   mornings. Patient experience headaches and ear aches, not sure if due to   "sinus or eyes". Patient used OTC eye drops PRN-OU, d/c using after a few   days. Patient denies floaters/floaters.     Last edited by Martinez Redd MA on 12/26/2018  1:57 PM. (History)            Assessment /Plan     For exam results, see Encounter Report.    Chalazion of left lower eyelid  -     neomycin-polymyxin-dexamethasone (MAXITROL) 3.5mg/mL-10,000 unit/mL-0.1 % DrpS; Place 1 drop into the left eye 4 (four) times daily. for 10 days  Dispense: 5 mL; Refill: 0      1. Cont warm compress and start Maxitrol drops 4x/day x 10 days. OK to cont warm compress longer. RTC if no better after 10 days and no resolution after 2-3 weeks.                 "

## 2019-01-22 NOTE — TELEPHONE ENCOUNTER
Natound results received. Dr June to review and will contact patient with notes.  
Spoke with patient. Ultrasound showed no enlarged lymph nodes. No abnormalities were seen in neck or left axilla.  
n/a

## 2019-08-01 ENCOUNTER — NURSE TRIAGE (OUTPATIENT)
Dept: ADMINISTRATIVE | Facility: CLINIC | Age: 34
End: 2019-08-01

## 2019-08-01 NOTE — TELEPHONE ENCOUNTER
Patient states that she wanted an appointment for SOB for two months, tightness in lungs at night. Patient states that she had asthma as a child and knows what it feels like to be SOB and this is not the same. Appointment made for patient. Verbalized understanding.     Reason for Disposition   MILD longstanding difficulty breathing (e.g., speaks in phrases, SOB even at rest, pulse 100-120) and SAME as normal    Protocols used: BREATHING DIFFICULTY-A-OH

## 2019-08-15 ENCOUNTER — PATIENT MESSAGE (OUTPATIENT)
Dept: INTERNAL MEDICINE | Facility: CLINIC | Age: 34
End: 2019-08-15

## 2019-08-15 ENCOUNTER — TELEPHONE (OUTPATIENT)
Dept: INTERNAL MEDICINE | Facility: CLINIC | Age: 34
End: 2019-08-15

## 2019-08-15 ENCOUNTER — HOSPITAL ENCOUNTER (OUTPATIENT)
Dept: RADIOLOGY | Facility: HOSPITAL | Age: 34
Discharge: HOME | End: 2019-08-15
Attending: HOSPITALIST
Payer: COMMERCIAL

## 2019-08-15 ENCOUNTER — OFFICE VISIT (OUTPATIENT)
Dept: INTERNAL MEDICINE | Facility: CLINIC | Age: 34
End: 2019-08-15
Payer: COMMERCIAL

## 2019-08-15 VITALS
HEIGHT: 64 IN | BODY MASS INDEX: 23.9 KG/M2 | HEART RATE: 95 BPM | SYSTOLIC BLOOD PRESSURE: 94 MMHG | RESPIRATION RATE: 16 BRPM | DIASTOLIC BLOOD PRESSURE: 58 MMHG | TEMPERATURE: 99 F | WEIGHT: 140 LBS | OXYGEN SATURATION: 98 %

## 2019-08-15 DIAGNOSIS — R06.02 SOB (SHORTNESS OF BREATH): ICD-10-CM

## 2019-08-15 DIAGNOSIS — F41.9 ANXIETY: ICD-10-CM

## 2019-08-15 DIAGNOSIS — Z00.00 ANNUAL PHYSICAL EXAM: Primary | ICD-10-CM

## 2019-08-15 DIAGNOSIS — R06.02 SOB (SHORTNESS OF BREATH): Primary | ICD-10-CM

## 2019-08-15 DIAGNOSIS — J45.20 MILD INTERMITTENT ASTHMA WITHOUT COMPLICATION: ICD-10-CM

## 2019-08-15 LAB
B-HCG UR QL: NEGATIVE
CTP QC/QA: YES

## 2019-08-15 PROCEDURE — 99214 OFFICE O/P EST MOD 30 MIN: CPT | Mod: S$GLB,,, | Performed by: HOSPITALIST

## 2019-08-15 PROCEDURE — 3008F BODY MASS INDEX DOCD: CPT | Mod: CPTII,S$GLB,, | Performed by: HOSPITALIST

## 2019-08-15 PROCEDURE — 81025 URINE PREGNANCY TEST: CPT | Mod: S$GLB,,, | Performed by: HOSPITALIST

## 2019-08-15 PROCEDURE — 71046 X-RAY EXAM CHEST 2 VIEWS: CPT | Mod: 26,,, | Performed by: RADIOLOGY

## 2019-08-15 PROCEDURE — 99214 PR OFFICE/OUTPT VISIT, EST, LEVL IV, 30-39 MIN: ICD-10-PCS | Mod: S$GLB,,, | Performed by: HOSPITALIST

## 2019-08-15 PROCEDURE — 3008F PR BODY MASS INDEX (BMI) DOCUMENTED: ICD-10-PCS | Mod: CPTII,S$GLB,, | Performed by: HOSPITALIST

## 2019-08-15 PROCEDURE — 81025 POCT URINE PREGNANCY: ICD-10-PCS | Mod: S$GLB,,, | Performed by: HOSPITALIST

## 2019-08-15 PROCEDURE — 99999 PR PBB SHADOW E&M-EST. PATIENT-LVL IV: CPT | Mod: PBBFAC,,, | Performed by: HOSPITALIST

## 2019-08-15 PROCEDURE — 71046 X-RAY EXAM CHEST 2 VIEWS: CPT | Mod: TC,PO

## 2019-08-15 PROCEDURE — 71046 XR CHEST PA AND LATERAL: ICD-10-PCS | Mod: 26,,, | Performed by: RADIOLOGY

## 2019-08-15 PROCEDURE — 99999 PR PBB SHADOW E&M-EST. PATIENT-LVL IV: ICD-10-PCS | Mod: PBBFAC,,, | Performed by: HOSPITALIST

## 2019-08-15 RX ORDER — FLUTICASONE PROPIONATE 44 UG/1
2 AEROSOL, METERED RESPIRATORY (INHALATION) 2 TIMES DAILY
Qty: 10.6 G | Refills: 2 | Status: SHIPPED | OUTPATIENT
Start: 2019-08-15 | End: 2019-10-24

## 2019-08-15 NOTE — TELEPHONE ENCOUNTER
Patient is requesting to have labs and urine test ordered. Patient wanting her cholesterol, blood count and electrolytes tested. She recently donated blood and was told that her cholesterol level was slightly elevated.  Patient has a lab and urine appointment scheduled for next week.

## 2019-08-15 NOTE — PROGRESS NOTES
"Subjective:     @Patient ID: Sofi Vidal is a 34 y.o. female.    Chief Complaint: Shortness of Breath    HPI  33 yo F presents for urgent visit with sob and chest tightness.   Reports chest tightness, unable to take a deep breath x 2 months. Usually in evening. No wheezing. Reports it will resolve on its. No chest pain or palpitations.   Reports when jogging has cough w/ mucus.   Reports had URI 2 months ago after traveling  Pt also reports her   a few years ago from cancer at the age of 36 and she continues to deal with that. Sometimes has anxiety with symptoms. States his was discovered on CXR    Review of Systems   Constitutional: Negative for chills and fever.   HENT: Negative for congestion, ear pain, rhinorrhea and sore throat.    Eyes: Negative for pain and visual disturbance.   Respiratory: Positive for shortness of breath. Negative for cough and wheezing.    Cardiovascular: Negative for chest pain and leg swelling.   Gastrointestinal: Negative for abdominal pain, nausea and vomiting.   Endocrine: Negative for polydipsia and polyuria.   Genitourinary: Negative for difficulty urinating and dysuria.   Musculoskeletal: Negative for arthralgias, back pain and neck pain.   Skin: Negative for rash and wound.   Neurological: Negative for dizziness, weakness and headaches.   Psychiatric/Behavioral: Negative for agitation and confusion. The patient is nervous/anxious.      Past medical history, surgical history, and family medical history reviewed and updated as appropriate.    Medications and allergies reviewed.     Objective:     Vitals:    08/15/19 1056 08/15/19 1126   BP: (!) 94/58    Pulse: 95    Resp: 16    Temp: 98.7 °F (37.1 °C)    TempSrc: Oral    SpO2:  98%   Weight: 63.5 kg (139 lb 15.9 oz)    Height: 5' 4" (1.626 m)      Body mass index is 24.03 kg/m².  Physical Exam   Constitutional: She is oriented to person, place, and time. She appears well-developed and well-nourished. No distress. "   HENT:   Head: Normocephalic and atraumatic.   Mouth/Throat: Oropharynx is clear and moist. No oropharyngeal exudate.   Eyes: Conjunctivae are normal. Right eye exhibits no discharge. Left eye exhibits no discharge.   Neck: Normal range of motion. Neck supple.   Cardiovascular: Normal rate, regular rhythm and intact distal pulses. Exam reveals no friction rub.   No murmur heard.  Pulmonary/Chest: Effort normal and breath sounds normal.   Abdominal: Soft. Bowel sounds are normal. She exhibits no distension. There is no tenderness. There is no guarding.   Musculoskeletal: Normal range of motion. She exhibits no edema.   Neurological: She is alert and oriented to person, place, and time.   Skin: Skin is warm and dry.   Psychiatric: She has a normal mood and affect. Her behavior is normal.   Vitals reviewed.      Lab Results   Component Value Date    WBC 5.5 01/26/2018    HGB 12.5 01/26/2018    HCT 37.9 01/26/2018     01/26/2018    CHOL 188 01/26/2018    TRIG 50 01/26/2018    HDL 83 01/26/2018    ALT 12 01/26/2018    AST 15 01/26/2018     01/26/2018    K 4.1 01/26/2018     01/26/2018    CREATININE 0.59 01/26/2018    BUN 11 01/26/2018    CO2 30 01/26/2018    TSH 1.85 01/26/2018    HGBA1C 4.8 12/13/2017       Assessment:     1. SOB (shortness of breath)    2. Anxiety    3. Mild intermittent asthma without complication      Plan:   Sofi was seen today for shortness of breath.    Diagnoses and all orders for this visit:    SOB (shortness of breath)  - Unclear if anxiety related or asthma related or both. O2 sat normal in clinic. Will check CXR also give trial of flovent. Pt prefers not to start medication for anxiety but is ok with referral to psych  -     X-Ray Chest PA And Lateral; Future  -     POCT Urine Pregnancy    Anxiety  -     Ambulatory Referral to Psychiatry    Mild intermittent asthma without complication  -     fluticasone propionate (FLOVENT HFA) 44 mcg/actuation inhaler; Inhale 2  puffs into the lungs 2 (two) times daily. Cofntroller          Follow up if symptoms worsen or fail to improve.    Karine Bolton MD  Internal Medicine    8/15/2019

## 2019-08-16 ENCOUNTER — TELEPHONE (OUTPATIENT)
Dept: INTERNAL MEDICINE | Facility: CLINIC | Age: 34
End: 2019-08-16

## 2019-08-16 NOTE — TELEPHONE ENCOUNTER
----- Message from Gavino Grey sent at 8/16/2019 12:27 PM CDT -----  Contact: Self [phone]  Patient has an upcoming lab appointment at the [facility] on 08/20  Please enter lab orders and link them to this appointment.    Thanks,  [facility] 5th floor reception desk

## 2019-09-24 ENCOUNTER — LAB VISIT (OUTPATIENT)
Dept: LAB | Facility: HOSPITAL | Age: 34
End: 2019-09-24
Attending: INTERNAL MEDICINE
Payer: COMMERCIAL

## 2019-09-24 DIAGNOSIS — Z00.00 ANNUAL PHYSICAL EXAM: ICD-10-CM

## 2019-09-24 LAB
ALBUMIN SERPL BCP-MCNC: 4.4 G/DL (ref 3.5–5.2)
ALP SERPL-CCNC: 56 U/L (ref 55–135)
ALT SERPL W/O P-5'-P-CCNC: 13 U/L (ref 10–44)
ANION GAP SERPL CALC-SCNC: 8 MMOL/L (ref 8–16)
AST SERPL-CCNC: 18 U/L (ref 10–40)
BASOPHILS # BLD AUTO: 0.03 K/UL (ref 0–0.2)
BASOPHILS NFR BLD: 0.8 % (ref 0–1.9)
BILIRUB SERPL-MCNC: 1 MG/DL (ref 0.1–1)
BUN SERPL-MCNC: 10 MG/DL (ref 6–20)
CALCIUM SERPL-MCNC: 9.3 MG/DL (ref 8.7–10.5)
CHLORIDE SERPL-SCNC: 106 MMOL/L (ref 95–110)
CHOLEST SERPL-MCNC: 219 MG/DL (ref 120–199)
CHOLEST/HDLC SERPL: 2.4 {RATIO} (ref 2–5)
CO2 SERPL-SCNC: 26 MMOL/L (ref 23–29)
CREAT SERPL-MCNC: 0.7 MG/DL (ref 0.5–1.4)
DIFFERENTIAL METHOD: ABNORMAL
EOSINOPHIL # BLD AUTO: 0.1 K/UL (ref 0–0.5)
EOSINOPHIL NFR BLD: 1.3 % (ref 0–8)
ERYTHROCYTE [DISTWIDTH] IN BLOOD BY AUTOMATED COUNT: 13.3 % (ref 11.5–14.5)
EST. GFR  (AFRICAN AMERICAN): >60 ML/MIN/1.73 M^2
EST. GFR  (NON AFRICAN AMERICAN): >60 ML/MIN/1.73 M^2
GLUCOSE SERPL-MCNC: 74 MG/DL (ref 70–110)
HCT VFR BLD AUTO: 42 % (ref 37–48.5)
HDLC SERPL-MCNC: 90 MG/DL (ref 40–75)
HDLC SERPL: 41.1 % (ref 20–50)
HGB BLD-MCNC: 12.7 G/DL (ref 12–16)
IMM GRANULOCYTES # BLD AUTO: 0 K/UL (ref 0–0.04)
IMM GRANULOCYTES NFR BLD AUTO: 0 % (ref 0–0.5)
LDLC SERPL CALC-MCNC: 115.4 MG/DL (ref 63–159)
LYMPHOCYTES # BLD AUTO: 1.8 K/UL (ref 1–4.8)
LYMPHOCYTES NFR BLD: 45.6 % (ref 18–48)
MCH RBC QN AUTO: 27.9 PG (ref 27–31)
MCHC RBC AUTO-ENTMCNC: 30.2 G/DL (ref 32–36)
MCV RBC AUTO: 92 FL (ref 82–98)
MONOCYTES # BLD AUTO: 0.2 K/UL (ref 0.3–1)
MONOCYTES NFR BLD: 4.9 % (ref 4–15)
NEUTROPHILS # BLD AUTO: 1.9 K/UL (ref 1.8–7.7)
NEUTROPHILS NFR BLD: 47.4 % (ref 38–73)
NONHDLC SERPL-MCNC: 129 MG/DL
NRBC BLD-RTO: 0 /100 WBC
PLATELET # BLD AUTO: 223 K/UL (ref 150–350)
PMV BLD AUTO: 11.5 FL (ref 9.2–12.9)
POTASSIUM SERPL-SCNC: 4.1 MMOL/L (ref 3.5–5.1)
PROT SERPL-MCNC: 7.5 G/DL (ref 6–8.4)
RBC # BLD AUTO: 4.55 M/UL (ref 4–5.4)
SODIUM SERPL-SCNC: 140 MMOL/L (ref 136–145)
TRIGL SERPL-MCNC: 68 MG/DL (ref 30–150)
TSH SERPL DL<=0.005 MIU/L-ACNC: 1.53 UIU/ML (ref 0.4–4)
WBC # BLD AUTO: 3.9 K/UL (ref 3.9–12.7)

## 2019-09-24 PROCEDURE — 84443 ASSAY THYROID STIM HORMONE: CPT

## 2019-09-24 PROCEDURE — 36415 COLL VENOUS BLD VENIPUNCTURE: CPT | Mod: PO

## 2019-09-24 PROCEDURE — 80061 LIPID PANEL: CPT

## 2019-09-24 PROCEDURE — 80053 COMPREHEN METABOLIC PANEL: CPT

## 2019-09-24 PROCEDURE — 85025 COMPLETE CBC W/AUTO DIFF WBC: CPT

## 2019-10-01 ENCOUNTER — OFFICE VISIT (OUTPATIENT)
Dept: INTERNAL MEDICINE | Facility: CLINIC | Age: 34
End: 2019-10-01
Payer: COMMERCIAL

## 2019-10-01 VITALS
BODY MASS INDEX: 23.37 KG/M2 | WEIGHT: 136.88 LBS | HEART RATE: 76 BPM | DIASTOLIC BLOOD PRESSURE: 68 MMHG | SYSTOLIC BLOOD PRESSURE: 96 MMHG | TEMPERATURE: 98 F | RESPIRATION RATE: 16 BRPM | HEIGHT: 64 IN

## 2019-10-01 DIAGNOSIS — Z00.00 ANNUAL PHYSICAL EXAM: Primary | ICD-10-CM

## 2019-10-01 DIAGNOSIS — F41.9 ANXIETY AND DEPRESSION: ICD-10-CM

## 2019-10-01 DIAGNOSIS — F32.A ANXIETY AND DEPRESSION: ICD-10-CM

## 2019-10-01 DIAGNOSIS — L70.9 ACNE, UNSPECIFIED ACNE TYPE: ICD-10-CM

## 2019-10-01 PROBLEM — H81.10 BPPV (BENIGN PAROXYSMAL POSITIONAL VERTIGO): Status: RESOLVED | Noted: 2017-12-29 | Resolved: 2019-10-01

## 2019-10-01 PROCEDURE — 99999 PR PBB SHADOW E&M-EST. PATIENT-LVL III: ICD-10-PCS | Mod: PBBFAC,,, | Performed by: INTERNAL MEDICINE

## 2019-10-01 PROCEDURE — 99395 PREV VISIT EST AGE 18-39: CPT | Mod: 25,S$GLB,, | Performed by: INTERNAL MEDICINE

## 2019-10-01 PROCEDURE — 99395 PR PREVENTIVE VISIT,EST,18-39: ICD-10-PCS | Mod: 25,S$GLB,, | Performed by: INTERNAL MEDICINE

## 2019-10-01 PROCEDURE — 90686 FLU VACCINE (QUAD) GREATER THAN OR EQUAL TO 3YO PRESERVATIVE FREE IM: ICD-10-PCS | Mod: S$GLB,,, | Performed by: INTERNAL MEDICINE

## 2019-10-01 PROCEDURE — 90471 IMMUNIZATION ADMIN: CPT | Mod: S$GLB,,, | Performed by: INTERNAL MEDICINE

## 2019-10-01 PROCEDURE — 90686 IIV4 VACC NO PRSV 0.5 ML IM: CPT | Mod: S$GLB,,, | Performed by: INTERNAL MEDICINE

## 2019-10-01 PROCEDURE — 99999 PR PBB SHADOW E&M-EST. PATIENT-LVL III: CPT | Mod: PBBFAC,,, | Performed by: INTERNAL MEDICINE

## 2019-10-01 PROCEDURE — 90471 FLU VACCINE (QUAD) GREATER THAN OR EQUAL TO 3YO PRESERVATIVE FREE IM: ICD-10-PCS | Mod: S$GLB,,, | Performed by: INTERNAL MEDICINE

## 2019-10-01 RX ORDER — ESCITALOPRAM OXALATE 5 MG/1
5 TABLET ORAL DAILY
Qty: 30 TABLET | Refills: 0 | Status: SHIPPED | OUTPATIENT
Start: 2019-10-01 | End: 2019-10-22

## 2019-10-01 NOTE — PROGRESS NOTES
Subjective:      Sofi Vidal is a 34 y.o. female who presents for annual exam.    Family History:  family history includes Breast cancer (age of onset: 68) in her maternal grandmother; Cancer in her maternal grandmother.    Health Maintenance:  Health Maintenance       Date Due Completion Date    Influenza Vaccine (1) 09/01/2019 1/4/2017    Pap Smear with HPV Cotest 01/03/2020 1/3/2017    TETANUS VACCINE 11/17/2024 11/17/2014        Eye exam: not in last few years  Dental Exam: every 6 months  OB/GYN: Dr. Mccormack  Last Pap: 1/2017    Influenza: due  Tetanus: 2014    Exercise: walks and runs regularly  Body mass index is 23.87 kg/m².    Meds:   Current Outpatient Medications:     fluticasone propionate (FLOVENT HFA) 44 mcg/actuation inhaler, Inhale 2 puffs into the lungs 2 (two) times daily. Controller, Disp: 10.6 g, Rfl: 2    escitalopram oxalate (LEXAPRO) 5 MG Tab, Take 1 tablet (5 mg total) by mouth once daily., Disp: 30 tablet, Rfl: 0    PMHx:   Past Medical History:   Diagnosis Date    Abnormal cervical Papanicolaou smear 2011, 11-19-12    Colposcopy , HONEY 1    Childhood asthma     Cholelithiasis complicating pregnancy, antepartum        PSHx:  Past Surgical History:   Procedure Laterality Date    COLPOSCOPY      LASIK         SocHx:   Social History     Socioeconomic History    Marital status:      Spouse name: Not on file    Number of children: Not on file    Years of education: Not on file    Highest education level: Not on file   Occupational History    Not on file   Social Needs    Financial resource strain: Not on file    Food insecurity:     Worry: Not on file     Inability: Not on file    Transportation needs:     Medical: Not on file     Non-medical: Not on file   Tobacco Use    Smoking status: Never Smoker    Smokeless tobacco: Never Used   Substance and Sexual Activity    Alcohol use: Yes     Comment: 2 drinks per week    Drug use: No    Sexual activity: Not Currently      Partners: Male     Birth control/protection: IUD     Comment: mirena 2/10/17   Lifestyle    Physical activity:     Days per week: Not on file     Minutes per session: Not on file    Stress: Not on file   Relationships    Social connections:     Talks on phone: Not on file     Gets together: Not on file     Attends Yarsani service: Not on file     Active member of club or organization: Not on file     Attends meetings of clubs or organizations: Not on file     Relationship status: Not on file   Other Topics Concern    Not on file   Social History Narrative     recently  of cancer, has a 2 year old child       Review of Systems   Constitutional: Negative for chills, diaphoresis and fever.   HENT: Negative for congestion, dental problem, ear pain, postnasal drip, rhinorrhea, sinus pressure, sore throat and trouble swallowing.    Eyes: Negative for discharge, redness and visual disturbance.   Respiratory: Negative for cough, chest tightness and shortness of breath.    Cardiovascular: Negative for chest pain, palpitations and leg swelling.   Gastrointestinal: Negative for abdominal pain, constipation, diarrhea, nausea and vomiting.   Endocrine: Negative for cold intolerance and heat intolerance.   Genitourinary: Negative for dysuria, frequency, hematuria and urgency.   Musculoskeletal: Negative for arthralgias and myalgias.   Skin: Positive for rash (has acne on face, neck and arms). Negative for wound.   Neurological: Negative for dizziness, weakness, numbness and headaches.   Hematological: Negative for adenopathy.   Psychiatric/Behavioral: Positive for dysphoric mood (episodes of depression and recently had symptoms for 1-2 weeks and her feelings affected her work). Negative for sleep disturbance. The patient is nervous/anxious (long-stanging mild anxiety that she now recognizes).        Objective:      Physical Exam   Constitutional: She is oriented to person, place, and time. Vital signs are  normal. She appears well-developed and well-nourished. No distress.   HENT:   Head: Normocephalic and atraumatic.   Right Ear: Hearing, tympanic membrane, external ear and ear canal normal. Tympanic membrane is not erythematous and not bulging.   Left Ear: Hearing, tympanic membrane, external ear and ear canal normal. Tympanic membrane is not erythematous and not bulging.   Nose: Nose normal.   Mouth/Throat: Uvula is midline, oropharynx is clear and moist and mucous membranes are normal. No oropharyngeal exudate or posterior oropharyngeal erythema.   Eyes: Pupils are equal, round, and reactive to light. Conjunctivae, EOM and lids are normal. No scleral icterus.   Neck: Normal range of motion. Neck supple. No thyroid mass and no thyromegaly present.   Cardiovascular: Normal rate, regular rhythm, normal heart sounds and intact distal pulses.   No murmur heard.  Pulmonary/Chest: Effort normal and breath sounds normal. She has no wheezes.   Abdominal: Soft. Bowel sounds are normal. She exhibits no distension. There is no hepatosplenomegaly. There is no tenderness. There is no rigidity, no rebound and no guarding.   Musculoskeletal: Normal range of motion. She exhibits no edema.   Lymphadenopathy:     She has no cervical adenopathy.        Right: No supraclavicular adenopathy present.        Left: No supraclavicular adenopathy present.   Neurological: She is alert and oriented to person, place, and time. She has normal reflexes. Coordination and gait normal.   Skin: Skin is warm, dry and intact. Rash noted. Rash is maculopapular. She is not diaphoretic.   Psychiatric: She has a normal mood and affect.   Vitals reviewed.      Assessment:       1. Annual physical exam    2. Anxiety and depression    3. Acne, unspecified acne type        Plan:       1. Annual physical exam  - reviewed recent labs with patient, consider adding MVI with Fe daily  - Influenza - Quadrivalent (PF)    2. Anxiety and depression  - patient will  schedule session with Psychiatry as previously ordered  - she agrees with trial of Lexpro  - escitalopram oxalate (LEXAPRO) 5 MG Tab; Take 1 tablet (5 mg total) by mouth once daily.  Dispense: 30 tablet; Refill: 0    3. Acne, unspecified acne type  - advised to try Differin gel OTC for now  - if no improvement, may consider Dermatology referral    RTC in 3 months or sooner if needed    Cheri June MD

## 2019-10-03 ENCOUNTER — TELEPHONE (OUTPATIENT)
Dept: OBSTETRICS AND GYNECOLOGY | Facility: CLINIC | Age: 34
End: 2019-10-03

## 2019-10-03 DIAGNOSIS — Z30.9 ENCOUNTER FOR CONTRACEPTIVE MANAGEMENT, UNSPECIFIED TYPE: Primary | ICD-10-CM

## 2019-10-03 RX ORDER — NORGESTIMATE AND ETHINYL ESTRADIOL 7DAYSX3 28
1 KIT ORAL DAILY
Qty: 28 TABLET | Refills: 12 | Status: SHIPPED | OUTPATIENT
Start: 2019-10-03 | End: 2020-10-31

## 2019-10-03 NOTE — TELEPHONE ENCOUNTER
Please inform the patient ortho tri cycle was sent to the pharmacy. If she has any issues or concerns I can send in junel Fe if she wants as an alternative   Thanks  CATHERINE

## 2019-10-14 ENCOUNTER — OFFICE VISIT (OUTPATIENT)
Dept: URGENT CARE | Facility: CLINIC | Age: 34
End: 2019-10-14
Payer: COMMERCIAL

## 2019-10-14 VITALS
WEIGHT: 136 LBS | BODY MASS INDEX: 23.22 KG/M2 | OXYGEN SATURATION: 99 % | DIASTOLIC BLOOD PRESSURE: 70 MMHG | RESPIRATION RATE: 19 BRPM | HEART RATE: 79 BPM | HEIGHT: 64 IN | TEMPERATURE: 98 F | SYSTOLIC BLOOD PRESSURE: 100 MMHG

## 2019-10-14 DIAGNOSIS — B96.89 ACUTE BACTERIAL SINUSITIS: Primary | ICD-10-CM

## 2019-10-14 DIAGNOSIS — J01.90 ACUTE BACTERIAL SINUSITIS: Primary | ICD-10-CM

## 2019-10-14 DIAGNOSIS — R05.9 COUGH: ICD-10-CM

## 2019-10-14 PROCEDURE — 3008F BODY MASS INDEX DOCD: CPT | Mod: CPTII,S$GLB,, | Performed by: NURSE PRACTITIONER

## 2019-10-14 PROCEDURE — 3008F PR BODY MASS INDEX (BMI) DOCUMENTED: ICD-10-PCS | Mod: CPTII,S$GLB,, | Performed by: NURSE PRACTITIONER

## 2019-10-14 PROCEDURE — 99214 PR OFFICE/OUTPT VISIT, EST, LEVL IV, 30-39 MIN: ICD-10-PCS | Mod: S$GLB,,, | Performed by: NURSE PRACTITIONER

## 2019-10-14 PROCEDURE — 99214 OFFICE O/P EST MOD 30 MIN: CPT | Mod: S$GLB,,, | Performed by: NURSE PRACTITIONER

## 2019-10-14 RX ORDER — PREDNISONE 20 MG/1
40 TABLET ORAL DAILY
Qty: 10 TABLET | Refills: 0 | Status: SHIPPED | OUTPATIENT
Start: 2019-10-14 | End: 2019-10-19

## 2019-10-14 RX ORDER — BENZONATATE 100 MG/1
100 CAPSULE ORAL 3 TIMES DAILY PRN
Qty: 30 CAPSULE | Refills: 0 | Status: SHIPPED | OUTPATIENT
Start: 2019-10-14 | End: 2019-10-22

## 2019-10-14 RX ORDER — FLUTICASONE PROPIONATE 50 MCG
2 SPRAY, SUSPENSION (ML) NASAL DAILY
Qty: 1 BOTTLE | Refills: 0 | Status: SHIPPED | OUTPATIENT
Start: 2019-10-14 | End: 2019-10-22

## 2019-10-14 RX ORDER — DOXYCYCLINE 100 MG/1
100 CAPSULE ORAL 2 TIMES DAILY
Qty: 20 CAPSULE | Refills: 0 | Status: SHIPPED | OUTPATIENT
Start: 2019-10-14 | End: 2019-10-22

## 2019-10-14 RX ORDER — BETAMETHASONE SODIUM PHOSPHATE AND BETAMETHASONE ACETATE 3; 3 MG/ML; MG/ML
9 INJECTION, SUSPENSION INTRA-ARTICULAR; INTRALESIONAL; INTRAMUSCULAR; SOFT TISSUE
Status: DISCONTINUED | OUTPATIENT
Start: 2019-10-14 | End: 2019-10-14

## 2019-10-14 NOTE — PROGRESS NOTES
"Subjective:       Patient ID: Sofi Vidal is a 34 y.o. female.    Vitals:  height is 5' 4" (1.626 m) and weight is 61.7 kg (136 lb). Her oral temperature is 97.6 °F (36.4 °C). Her blood pressure is 100/70 and her pulse is 79. Her respiration is 19 and oxygen saturation is 99%.     Chief Complaint: URI    Patient presents to clinic today with complaints of intermittent sinus congestion and sinus pressure x 1.5 weeks.  Patient reports she has been having a persistent cough over the last week.  Patient complains of generalized malaise and fatigue.  Denies any fever or chills.  Patient reports she is taking a few doses of Mucinex with minimal relief.  Patient does have a history of asthma.  Denies any chest pain or shortness of breath.    URI    This is a new problem. The current episode started 1 to 4 weeks ago (1.5 weeks ). The problem has been unchanged. There has been no fever. Associated symptoms include congestion, coughing, ear pain and sinus pain. Pertinent negatives include no abdominal pain, chest pain, diarrhea, dysuria, headaches, joint pain, joint swelling, nausea, neck pain, plugged ear sensation, rash, rhinorrhea, sneezing, sore throat, swollen glands, vomiting or wheezing. Treatments tried: mucinex different vits. The treatment provided mild relief.       Constitution: Negative for chills, sweating, fatigue and fever.   HENT: Positive for ear pain, congestion, postnasal drip, sinus pain and sinus pressure. Negative for sore throat and voice change.    Neck: Negative for neck pain and painful lymph nodes.   Cardiovascular: Negative for chest pain.   Eyes: Negative for eye redness.   Respiratory: Positive for cough and sputum production. Negative for chest tightness, bloody sputum, COPD, shortness of breath, stridor, wheezing and asthma.    Gastrointestinal: Negative for abdominal pain, nausea, vomiting and diarrhea.   Genitourinary: Negative for dysuria.   Musculoskeletal: Negative for muscle ache. "   Skin: Negative for rash.   Allergic/Immunologic: Negative for seasonal allergies, asthma and sneezing.   Neurological: Negative for headaches.   Hematologic/Lymphatic: Negative for swollen lymph nodes.       Objective:      Physical Exam   Constitutional: She is oriented to person, place, and time. She appears well-developed and well-nourished. She is cooperative.  Non-toxic appearance. She does not have a sickly appearance. She does not appear ill. No distress.   HENT:   Head: Normocephalic and atraumatic.   Right Ear: Hearing, external ear and ear canal normal. Tympanic membrane is bulging.   Left Ear: Hearing, external ear and ear canal normal. Tympanic membrane is bulging.   Nose: Mucosal edema and rhinorrhea present. No nasal deformity. No epistaxis. Right sinus exhibits maxillary sinus tenderness. Right sinus exhibits no frontal sinus tenderness. Left sinus exhibits maxillary sinus tenderness. Left sinus exhibits no frontal sinus tenderness.   Mouth/Throat: Uvula is midline and mucous membranes are normal. No trismus in the jaw. Normal dentition. No uvula swelling. Posterior oropharyngeal erythema present. No oropharyngeal exudate or posterior oropharyngeal edema.   Eyes: Conjunctivae and lids are normal. No scleral icterus.   Neck: Trachea normal, full passive range of motion without pain and phonation normal. Neck supple. No neck rigidity. No edema and no erythema present.   Cardiovascular: Normal rate, regular rhythm, normal heart sounds, intact distal pulses and normal pulses.   Pulmonary/Chest: Effort normal and breath sounds normal. No respiratory distress. She has no decreased breath sounds. She has no rhonchi.           Abdominal: Normal appearance.   Musculoskeletal: Normal range of motion. She exhibits no edema or deformity.   Neurological: She is alert and oriented to person, place, and time. She exhibits normal muscle tone. Coordination normal.   Skin: Skin is warm, dry, intact, not diaphoretic  and not pale.   Psychiatric: She has a normal mood and affect. Her speech is normal and behavior is normal. Judgment and thought content normal. Cognition and memory are normal.   Nursing note and vitals reviewed.        Assessment:       1. Acute bacterial sinusitis    2. Cough        Plan:         Acute bacterial sinusitis  -     Discontinue: betamethasone acetate-betamethasone sodium phosphate injection 9 mg  -     fluticasone propionate (FLONASE) 50 mcg/actuation nasal spray; 2 sprays (100 mcg total) by Each Nostril route once daily.  Dispense: 1 Bottle; Refill: 0  -     doxycycline (VIBRAMYCIN) 100 MG Cap; Take 1 capsule (100 mg total) by mouth 2 (two) times daily. for 10 days  Dispense: 20 capsule; Refill: 0  -     predniSONE (DELTASONE) 20 MG tablet; Take 2 tablets (40 mg total) by mouth once daily. for 5 days  Dispense: 10 tablet; Refill: 0    Cough  -     benzonatate (TESSALON) 100 MG capsule; Take 1 capsule (100 mg total) by mouth 3 (three) times daily as needed.  Dispense: 30 capsule; Refill: 0  -     predniSONE (DELTASONE) 20 MG tablet; Take 2 tablets (40 mg total) by mouth once daily. for 5 days  Dispense: 10 tablet; Refill: 0      Patient Instructions   Sinusitis (Antibiotic Treatment)    The sinuses are air-filled spaces within the bones of the face. They connect to the inside of the nose. Sinusitis is an inflammation of the tissue lining the sinus cavity. Sinus inflammation can occur during a cold. It can also be due to allergies to pollens and other particles in the air. Sinusitis can cause symptoms of sinus congestion and fullness. A sinus infection causes fever, headache and facial pain. There is often green or yellow drainage from the nose or into the back of the throat (post-nasal drip). You have been given antibiotics to treat this condition.  Home care:  · Take the full course of antibiotics as instructed. Do not stop taking them, even if you feel better.  · Drink plenty of water, hot tea, and  other liquids. This may help thin mucus. It also may promote sinus drainage.  · Heat may help soothe painful areas of the face. Use a towel soaked in hot water. Or,  the shower and direct the hot spray onto your face. Using a vaporizer along with a menthol rub at night may also help.   · An expectorant containing guaifenesin may help thin the mucus and promote drainage from the sinuses.  · Over-the-counter decongestants may be used unless a similar medicine was prescribed. Nasal sprays work the fastest. Use one that contains phenylephrine or oxymetazoline. First blow the nose gently. Then use the spray. Do not use these medicines more often than directed on the label or symptoms may get worse. You may also use tablets containing pseudoephedrine. Avoid products that combine ingredients, because side effects may be increased. Read labels. You can also ask the pharmacist for help. (NOTE: Persons with high blood pressure should not use decongestants. They can raise blood pressure.)  · Over-the-counter antihistamines may help if allergies contributed to your sinusitis.    · Do not use nasal rinses or irrigation during an acute sinus infection, unless told to by your health care provider. Rinsing may spread the infection to other sinuses.  · Use acetaminophen or ibuprofen to control pain, unless another pain medicine was prescribed. (If you have chronic liver or kidney disease or ever had a stomach ulcer, talk with your doctor before using these medicines. Aspirin should never be used in anyone under 18 years of age who is ill with a fever. It may cause severe liver damage.)  · Don't smoke. This can worsen symptoms.  Follow-up care  Follow up with your healthcare provider or our staff if you are not improving within the next week.  When to seek medical advice  Call your healthcare provider if any of these occur:  · Facial pain or headache becoming more severe  · Stiff neck  · Unusual drowsiness or  "confusion  · Swelling of the forehead or eyelids  · Vision problems, including blurred or double vision  · Fever of 100.4ºF (38ºC) or higher, or as directed by your healthcare provider  · Seizure  · Breathing problems  · Symptoms not resolving within 10 days  Date Last Reviewed: 4/13/2015  © 0150-6317 [employer]. [address] [place] [address], [place] [address]. All rights reserved. This information is not intended as a substitute for professional medical care. Always follow your healthcare professional's instructions.                                                                    Sinusitis   If your condition worsens or fails to improve we recommend that you receive another evaluation at the ER immediately or contact your PCP to discuss your concerns or return here. You must understand that you've received an urgent care treatment only and that you may be released before all your medical problems are known or treated. You the patient will arrange for followup care as instructed.   If we discussed that I think your illness is viral it will not respond to antibiotics and it will last 10-14 days. However, if over the next few days the symptoms worsen start the antibiotics I have given you.   If we discussed that you require antibiotics start them now and take them to completion.   If you are female and on BCP and do take the antibiotics, use additional methods to prevent pregnancy while on the antibiotics and for one cycle after.   Flonase (fluticasone) is a nasal spray which is available over the counter and may help with your symptoms   Zyrtec D, Claritin D or allegra D can also help with symptoms of congestion and drainage.   If you have hypertension avoid using the "D" which is the decongestant   If you just have drainage you can take plain zyrtec, claritin or allegra   If you just have a congested feeling you can take pseudoephedrine (unless you have high blood pressure) which you have to sign for " behind the counter. Do not buy the phenylephrine which is on the shelf as it is not effective   Rest and fluids are also important.   Tylenol or ibuprofen can also be used as directed for pain unless you have an allergy to them or medical condition such as stomach ulcers, kidney or liver disease or blood thinners etc for which you should not be taking these type of medications.   If you are flying in the next few days Afrin nose drops for the airplane flight upon take off and landing may help. Other than at those times refrain from using afrin.   If you were prescribed a narcotic do not drive or operate heavy machinery while taking these medications.     -Please take antibiotic to completion.  -Below are suggestions for symptomatic relief:              -Tylenol every 4 hours OR ibuprofen every 6 hours as needed for pain/fever.              -Salt water gargles to soothe throat pain.              -Chloroseptic spray also helps to numb throat pain.              -Nasal saline spray reduces inflammation and dryness.              -Warm face compresses to help with facial sinus pain/pressure.              -Vicks vapor rub at night.              -Flonase OTC or Nasacort OTC for nasal congestion.              -Simple foods like chicken noodle soup.              -Delsym helps with coughing at night              -Zyrtec/Claritin during the day & Benadryl at night may help with allergies.    -Flonase daily.  -Decongestants.  -5 days of steroids.  -10 days of antibiotics.  -Please follow up with your Primary care provider within 2-5 days if your signs and symptoms have not resolved or worsen.     If your condition worsens or fails to improve we recommend that you receive another evaluation at the emergency room immediately or contact your primary medical clinic to discuss your concerns.   You must understand that you have received an Urgent Care treatment only and that you may be released before all of your medical problems are  known or treated. You, the patient, will arrange for follow up care as instructed.

## 2019-10-15 NOTE — PATIENT INSTRUCTIONS
Sinusitis (Antibiotic Treatment)    The sinuses are air-filled spaces within the bones of the face. They connect to the inside of the nose. Sinusitis is an inflammation of the tissue lining the sinus cavity. Sinus inflammation can occur during a cold. It can also be due to allergies to pollens and other particles in the air. Sinusitis can cause symptoms of sinus congestion and fullness. A sinus infection causes fever, headache and facial pain. There is often green or yellow drainage from the nose or into the back of the throat (post-nasal drip). You have been given antibiotics to treat this condition.  Home care:  · Take the full course of antibiotics as instructed. Do not stop taking them, even if you feel better.  · Drink plenty of water, hot tea, and other liquids. This may help thin mucus. It also may promote sinus drainage.  · Heat may help soothe painful areas of the face. Use a towel soaked in hot water. Or,  the shower and direct the hot spray onto your face. Using a vaporizer along with a menthol rub at night may also help.   · An expectorant containing guaifenesin may help thin the mucus and promote drainage from the sinuses.  · Over-the-counter decongestants may be used unless a similar medicine was prescribed. Nasal sprays work the fastest. Use one that contains phenylephrine or oxymetazoline. First blow the nose gently. Then use the spray. Do not use these medicines more often than directed on the label or symptoms may get worse. You may also use tablets containing pseudoephedrine. Avoid products that combine ingredients, because side effects may be increased. Read labels. You can also ask the pharmacist for help. (NOTE: Persons with high blood pressure should not use decongestants. They can raise blood pressure.)  · Over-the-counter antihistamines may help if allergies contributed to your sinusitis.    · Do not use nasal rinses or irrigation during an acute sinus infection, unless told to by  your health care provider. Rinsing may spread the infection to other sinuses.  · Use acetaminophen or ibuprofen to control pain, unless another pain medicine was prescribed. (If you have chronic liver or kidney disease or ever had a stomach ulcer, talk with your doctor before using these medicines. Aspirin should never be used in anyone under 18 years of age who is ill with a fever. It may cause severe liver damage.)  · Don't smoke. This can worsen symptoms.  Follow-up care  Follow up with your healthcare provider or our staff if you are not improving within the next week.  When to seek medical advice  Call your healthcare provider if any of these occur:  · Facial pain or headache becoming more severe  · Stiff neck  · Unusual drowsiness or confusion  · Swelling of the forehead or eyelids  · Vision problems, including blurred or double vision  · Fever of 100.4ºF (38ºC) or higher, or as directed by your healthcare provider  · Seizure  · Breathing problems  · Symptoms not resolving within 10 days  Date Last Reviewed: 4/13/2015  © 3872-8766 [employer]. [address] [place] [address]. All rights reserved. This information is not intended as a substitute for professional medical care. Always follow your healthcare professional's instructions.                                                                    Sinusitis   If your condition worsens or fails to improve we recommend that you receive another evaluation at the ER immediately or contact your PCP to discuss your concerns or return here. You must understand that you've received an urgent care treatment only and that you may be released before all your medical problems are known or treated. You the patient will arrange for followup care as instructed.   If we discussed that I think your illness is viral it will not respond to antibiotics and it will last 10-14 days. However, if over the next few days the symptoms worsen start the  "antibiotics I have given you.   If we discussed that you require antibiotics start them now and take them to completion.   If you are female and on BCP and do take the antibiotics, use additional methods to prevent pregnancy while on the antibiotics and for one cycle after.   Flonase (fluticasone) is a nasal spray which is available over the counter and may help with your symptoms   Zyrtec D, Claritin D or allegra D can also help with symptoms of congestion and drainage.   If you have hypertension avoid using the "D" which is the decongestant   If you just have drainage you can take plain zyrtec, claritin or allegra   If you just have a congested feeling you can take pseudoephedrine (unless you have high blood pressure) which you have to sign for behind the counter. Do not buy the phenylephrine which is on the shelf as it is not effective   Rest and fluids are also important.   Tylenol or ibuprofen can also be used as directed for pain unless you have an allergy to them or medical condition such as stomach ulcers, kidney or liver disease or blood thinners etc for which you should not be taking these type of medications.   If you are flying in the next few days Afrin nose drops for the airplane flight upon take off and landing may help. Other than at those times refrain from using afrin.   If you were prescribed a narcotic do not drive or operate heavy machinery while taking these medications.     -Please take antibiotic to completion.  -Below are suggestions for symptomatic relief:              -Tylenol every 4 hours OR ibuprofen every 6 hours as needed for pain/fever.              -Salt water gargles to soothe throat pain.              -Chloroseptic spray also helps to numb throat pain.              -Nasal saline spray reduces inflammation and dryness.              -Warm face compresses to help with facial sinus pain/pressure.              -Vicks vapor rub at night.              -Flonase OTC or Nasacort OTC for " nasal congestion.              -Simple foods like chicken noodle soup.              -Delsym helps with coughing at night              -Zyrtec/Claritin during the day & Benadryl at night may help with allergies.    -Flonase daily.  -Decongestants.  -5 days of steroids.  -10 days of antibiotics.  -Please follow up with your Primary care provider within 2-5 days if your signs and symptoms have not resolved or worsen.     If your condition worsens or fails to improve we recommend that you receive another evaluation at the emergency room immediately or contact your primary medical clinic to discuss your concerns.   You must understand that you have received an Urgent Care treatment only and that you may be released before all of your medical problems are known or treated. You, the patient, will arrange for follow up care as instructed.

## 2019-10-21 ENCOUNTER — PATIENT MESSAGE (OUTPATIENT)
Dept: INTERNAL MEDICINE | Facility: CLINIC | Age: 34
End: 2019-10-21

## 2019-10-21 ENCOUNTER — TELEPHONE (OUTPATIENT)
Dept: INTERNAL MEDICINE | Facility: CLINIC | Age: 34
End: 2019-10-21

## 2019-10-21 ENCOUNTER — LAB VISIT (OUTPATIENT)
Dept: LAB | Facility: HOSPITAL | Age: 34
End: 2019-10-21
Attending: INTERNAL MEDICINE
Payer: COMMERCIAL

## 2019-10-21 ENCOUNTER — OFFICE VISIT (OUTPATIENT)
Dept: URGENT CARE | Facility: CLINIC | Age: 34
End: 2019-10-21
Payer: COMMERCIAL

## 2019-10-21 VITALS
BODY MASS INDEX: 23.39 KG/M2 | RESPIRATION RATE: 18 BRPM | SYSTOLIC BLOOD PRESSURE: 107 MMHG | WEIGHT: 137 LBS | HEIGHT: 64 IN | HEART RATE: 88 BPM | TEMPERATURE: 98 F | OXYGEN SATURATION: 98 % | DIASTOLIC BLOOD PRESSURE: 74 MMHG

## 2019-10-21 DIAGNOSIS — M62.81 MUSCLE WEAKNESS (GENERALIZED): ICD-10-CM

## 2019-10-21 DIAGNOSIS — R53.83 FATIGUE, UNSPECIFIED TYPE: Primary | ICD-10-CM

## 2019-10-21 DIAGNOSIS — R61 NIGHT SWEATS: ICD-10-CM

## 2019-10-21 DIAGNOSIS — R53.83 FATIGUE, UNSPECIFIED TYPE: ICD-10-CM

## 2019-10-21 DIAGNOSIS — E61.1 IRON DEFICIENCY: Primary | ICD-10-CM

## 2019-10-21 DIAGNOSIS — R79.89 ABNORMAL CBC: ICD-10-CM

## 2019-10-21 DIAGNOSIS — M79.10 MUSCLE PAIN: Primary | ICD-10-CM

## 2019-10-21 DIAGNOSIS — M79.10 MUSCLE PAIN: ICD-10-CM

## 2019-10-21 LAB
ALBUMIN SERPL BCP-MCNC: 4.1 G/DL (ref 3.5–5.2)
ALP SERPL-CCNC: 46 U/L (ref 55–135)
ALT SERPL W/O P-5'-P-CCNC: 12 U/L (ref 10–44)
ANION GAP SERPL CALC-SCNC: 9 MMOL/L (ref 8–16)
AST SERPL-CCNC: 16 U/L (ref 10–40)
BASOPHILS # BLD AUTO: 0.06 K/UL (ref 0–0.2)
BASOPHILS NFR BLD: 0.6 % (ref 0–1.9)
BILIRUB SERPL-MCNC: 0.4 MG/DL (ref 0.1–1)
BUN SERPL-MCNC: 11 MG/DL (ref 6–20)
CALCIUM SERPL-MCNC: 9.6 MG/DL (ref 8.7–10.5)
CHLORIDE SERPL-SCNC: 103 MMOL/L (ref 95–110)
CK SERPL-CCNC: 25 U/L (ref 20–180)
CO2 SERPL-SCNC: 29 MMOL/L (ref 23–29)
CREAT SERPL-MCNC: 0.8 MG/DL (ref 0.5–1.4)
CRP SERPL-MCNC: 1 MG/L (ref 0–8.2)
CTP QC/QA: YES
DIFFERENTIAL METHOD: ABNORMAL
EOSINOPHIL # BLD AUTO: 0.1 K/UL (ref 0–0.5)
EOSINOPHIL NFR BLD: 0.5 % (ref 0–8)
ERYTHROCYTE [DISTWIDTH] IN BLOOD BY AUTOMATED COUNT: 13.2 % (ref 11.5–14.5)
ERYTHROCYTE [SEDIMENTATION RATE] IN BLOOD BY WESTERGREN METHOD: 7 MM/HR (ref 0–36)
EST. GFR  (AFRICAN AMERICAN): >60 ML/MIN/1.73 M^2
EST. GFR  (NON AFRICAN AMERICAN): >60 ML/MIN/1.73 M^2
FERRITIN SERPL-MCNC: 8 NG/ML (ref 20–300)
FLUAV AG NPH QL: NEGATIVE
FLUBV AG NPH QL: NEGATIVE
GLUCOSE SERPL-MCNC: 85 MG/DL (ref 70–110)
HCT VFR BLD AUTO: 39.8 % (ref 37–48.5)
HGB BLD-MCNC: 12.3 G/DL (ref 12–16)
IMM GRANULOCYTES # BLD AUTO: 0.04 K/UL (ref 0–0.04)
IMM GRANULOCYTES NFR BLD AUTO: 0.4 % (ref 0–0.5)
IRON SERPL-MCNC: 57 UG/DL (ref 30–160)
LYMPHOCYTES # BLD AUTO: 4.2 K/UL (ref 1–4.8)
LYMPHOCYTES NFR BLD: 39.9 % (ref 18–48)
MCH RBC QN AUTO: 27.9 PG (ref 27–31)
MCHC RBC AUTO-ENTMCNC: 30.9 G/DL (ref 32–36)
MCV RBC AUTO: 90 FL (ref 82–98)
MONOCYTES # BLD AUTO: 0.5 K/UL (ref 0.3–1)
MONOCYTES NFR BLD: 5 % (ref 4–15)
NEUTROPHILS # BLD AUTO: 5.6 K/UL (ref 1.8–7.7)
NEUTROPHILS NFR BLD: 53.6 % (ref 38–73)
NRBC BLD-RTO: 0 /100 WBC
PLATELET # BLD AUTO: 278 K/UL (ref 150–350)
PMV BLD AUTO: 11.8 FL (ref 9.2–12.9)
POTASSIUM SERPL-SCNC: 4.2 MMOL/L (ref 3.5–5.1)
PROT SERPL-MCNC: 7.2 G/DL (ref 6–8.4)
RBC # BLD AUTO: 4.41 M/UL (ref 4–5.4)
SATURATED IRON: 14 % (ref 20–50)
SODIUM SERPL-SCNC: 141 MMOL/L (ref 136–145)
TOTAL IRON BINDING CAPACITY: 400 UG/DL (ref 250–450)
TRANSFERRIN SERPL-MCNC: 270 MG/DL (ref 200–375)
WBC # BLD AUTO: 10.45 K/UL (ref 3.9–12.7)

## 2019-10-21 PROCEDURE — 87804 INFLUENZA ASSAY W/OPTIC: CPT | Mod: QW,S$GLB,, | Performed by: NURSE PRACTITIONER

## 2019-10-21 PROCEDURE — 83540 ASSAY OF IRON: CPT

## 2019-10-21 PROCEDURE — 71046 XR CHEST PA AND LATERAL: ICD-10-PCS | Mod: FY,S$GLB,, | Performed by: RADIOLOGY

## 2019-10-21 PROCEDURE — 3008F PR BODY MASS INDEX (BMI) DOCUMENTED: ICD-10-PCS | Mod: CPTII,S$GLB,, | Performed by: NURSE PRACTITIONER

## 2019-10-21 PROCEDURE — 86140 C-REACTIVE PROTEIN: CPT

## 2019-10-21 PROCEDURE — 82728 ASSAY OF FERRITIN: CPT

## 2019-10-21 PROCEDURE — 85025 COMPLETE CBC W/AUTO DIFF WBC: CPT

## 2019-10-21 PROCEDURE — 86038 ANTINUCLEAR ANTIBODIES: CPT

## 2019-10-21 PROCEDURE — 80053 COMPREHEN METABOLIC PANEL: CPT

## 2019-10-21 PROCEDURE — 3008F BODY MASS INDEX DOCD: CPT | Mod: CPTII,S$GLB,, | Performed by: NURSE PRACTITIONER

## 2019-10-21 PROCEDURE — 82550 ASSAY OF CK (CPK): CPT

## 2019-10-21 PROCEDURE — 87804 POCT INFLUENZA A/B: ICD-10-PCS | Mod: QW,S$GLB,, | Performed by: NURSE PRACTITIONER

## 2019-10-21 PROCEDURE — 99214 PR OFFICE/OUTPT VISIT, EST, LEVL IV, 30-39 MIN: ICD-10-PCS | Mod: S$GLB,,, | Performed by: NURSE PRACTITIONER

## 2019-10-21 PROCEDURE — 85652 RBC SED RATE AUTOMATED: CPT

## 2019-10-21 PROCEDURE — 36415 COLL VENOUS BLD VENIPUNCTURE: CPT | Mod: PO

## 2019-10-21 PROCEDURE — 99214 OFFICE O/P EST MOD 30 MIN: CPT | Mod: S$GLB,,, | Performed by: NURSE PRACTITIONER

## 2019-10-21 PROCEDURE — 71046 X-RAY EXAM CHEST 2 VIEWS: CPT | Mod: FY,S$GLB,, | Performed by: RADIOLOGY

## 2019-10-21 RX ORDER — PREDNISONE 20 MG/1
40 TABLET ORAL DAILY
Qty: 10 TABLET | Refills: 0 | Status: SHIPPED | OUTPATIENT
Start: 2019-10-21 | End: 2019-10-21

## 2019-10-21 NOTE — PROGRESS NOTES
"Subjective:       Patient ID: Sofi Vidal is a 34 y.o. female.    Vitals:  height is 5' 4" (1.626 m) and weight is 62.1 kg (137 lb). Her oral temperature is 98 °F (36.7 °C). Her blood pressure is 107/74 and her pulse is 88. Her respiration is 18 and oxygen saturation is 98%.     Chief Complaint: Fatigue    This is a 34-year-old female presents today with complaints of fatigue on and off since October 3rd.  Patient states her symptoms occurred after flu vaccine.   She states she was seen her 2 weeks ago and was treated for sinus infection with doxy. She states last night she started with night sweats. She states her muscle are extremely sore to light touch. She cannot get in with her PCP until 12/03.      Fatigue   This is a new problem. The current episode started 1 to 4 weeks ago. The problem occurs 2 to 4 times per day. Associated symptoms include diaphoresis, fatigue and weakness. Pertinent negatives include no abdominal pain, anorexia, arthralgias, change in bowel habit, chest pain, chills, congestion, coughing, fever, headaches, joint swelling, myalgias, nausea, neck pain, numbness, rash, sore throat, swollen glands, urinary symptoms, vertigo, visual change or vomiting. Nothing aggravates the symptoms. She has tried nothing for the symptoms. The treatment provided no relief.       Unable to perform ROS: Other   Constitution: Positive for sweating and fatigue. Negative for chills and fever.   HENT: Negative for congestion and sore throat.    Neck: Negative for neck pain.   Cardiovascular: Negative for chest pain.   Respiratory: Negative for cough.    Gastrointestinal: Negative for abdominal pain, nausea and vomiting.   Musculoskeletal: Negative for joint pain, joint swelling and muscle ache.   Skin: Negative for rash.   Neurological: Negative for history of vertigo, headaches and numbness.       Objective:      Physical Exam   Constitutional: She is oriented to person, place, and time. She appears " well-developed and well-nourished. She is cooperative.  Non-toxic appearance. She does not appear ill. No distress.   Thin appearance  Dressed appropriately    HENT:   Head: Normocephalic and atraumatic.   Right Ear: Hearing, tympanic membrane, external ear and ear canal normal.   Left Ear: Hearing, tympanic membrane, external ear and ear canal normal.   Nose: Nose normal. No mucosal edema, rhinorrhea or nasal deformity. No epistaxis. Right sinus exhibits no maxillary sinus tenderness and no frontal sinus tenderness. Left sinus exhibits no maxillary sinus tenderness and no frontal sinus tenderness.   Mouth/Throat: Uvula is midline, oropharynx is clear and moist and mucous membranes are normal. No trismus in the jaw. Normal dentition. No uvula swelling. No posterior oropharyngeal erythema.   Eyes: Conjunctivae and lids are normal. Right eye exhibits no discharge. Left eye exhibits no discharge. No scleral icterus.   Neck: Trachea normal, normal range of motion, full passive range of motion without pain and phonation normal. Neck supple.   Cardiovascular: Normal rate, regular rhythm, normal heart sounds, intact distal pulses and normal pulses.   Pulmonary/Chest: Effort normal and breath sounds normal. No respiratory distress.   Abdominal: Soft. Normal appearance and bowel sounds are normal. She exhibits no distension, no pulsatile midline mass and no mass. There is no tenderness.   Musculoskeletal: Normal range of motion. She exhibits no edema or deformity.   Neurological: She is alert and oriented to person, place, and time. She exhibits normal muscle tone. Coordination normal.   Skin: Skin is warm, dry, intact, not diaphoretic and not pale.   Psychiatric: Her speech is normal and behavior is normal. Judgment and thought content normal. Her mood appears anxious. Cognition and memory are normal.   Nursing note and vitals reviewed.        Results for orders placed or performed in visit on 10/21/19   POCT Influenza A/B    Result Value Ref Range    Rapid Influenza A Ag Negative Negative    Rapid Influenza B Ag Negative Negative     Acceptable Yes      X-ray chest: Normal chest radiograph.  No source for fatigue identified.  Assessment:       1. Fatigue, unspecified type    2. Night sweats    3. Muscle pain        Plan:         Call made to clinic  for sooner appt with a PCP. Able to get an appt tomorrow at 1: 40 PM with Dr. Boss for full work up. Patient verb an understanding and is in agreement with plan of care.     Fatigue, unspecified type  -     Ambulatory Referral to Family Practice  -     XR CHEST PA AND LATERAL; Future; Expected date: 10/21/2019    Night sweats  -     POCT Influenza A/B    Muscle pain           Patient Instructions     Follow up with PCP tomorrow for fatigue work up.   We will call with radiology report for Chest x-ray if it is abnormal.      You must understand that you've received an Urgent Care treatment only and that you may be released before all your medical problems are known or treated. You, the patient, will arrange for follow up care as instructed.  If your condition worsens we recommend that you receive another evaluation at the emergency room immediately or contact your primary medical clinics after hours call service to discuss your concerns.  Please return here or go to the Emergency Department for any concerns or worsening of condition.      Managing Fatigue     Family members can help with meals and chores around the house.   Fatigue is common. It can be caused by worry, lack of sleep, or poor appetite. Fatigue can also be a sign of anemia, a shortage of red blood cells. You might need medical treatment for anemia. The tips below can help you feel better.  Conserving energy  · Keep track of the times of day when you are most tired and plan around them. For instance, if you are more tired in the afternoon, try to get tasks done in the morning.  · Decide which tasks are  most important. Do those first.  · Pass tasks along to others when you need to. Ask for help.  · Accept help when its offered. Tell people what they can do to help. For instance, you may need someone to fix a meal, fold clothes, or put gas in your car.  · Plan rest times. You may want to take a nap each day. Just sitting quietly for a few minutes can make you feel more rested.  What you can do to feel better  · Relax before you try to sleep. Take a bath or read for a while.  · Form a sleep pattern. Go to bed at the same time each night and get up at the same time each morning.  · Eat well. Choose foods from all of the food groups each day.  · Exercise. Take a brisk walk to help increase your energy.  · Avoid caffeine and alcohol. Drink plenty of water or fruit juices instead.  Treating anemia  If you begin to feel more tired than normal, tell your doctor. Fatigue could be a sign of anemia. This problem is fairly common in cancer patients, especially during chemotherapy and radiation treatments. If your red blood cell count is too low, you may get a blood transfusion. In some cases, you may need medicine to increase the number of red blood cells your body makes.  When to call your healthcare provider  Call your healthcare provider if you have:  · Shortness of breath or chest pain  · A dizzy feeling when you get up from lying or sitting down  · Paler skin than normal  · Extreme tiredness that is not helped by sleep   Date Last Reviewed: 1/3/2016  © 0266-6448 [employer]. [address] [place] [address], [place] [address]. All rights reserved. This information is not intended as a substitute for professional medical care. Always follow your healthcare professional's instructions.

## 2019-10-21 NOTE — TELEPHONE ENCOUNTER
Spoke with patient.    Fatigue is chronic over the last few weeks. Reported signs of sinus infection and was treated with antibiotics and a steroid. Completed steroid 2 days ago and reports worsening of muscle pain and tenderness to palpation yesterday.    Will resume prednisone 40mg daily for 5 more days.    Please schedule labs today @ 2pm.

## 2019-10-21 NOTE — PATIENT INSTRUCTIONS
Follow up with PCP tomorrow for fatigue work up.   We will call with radiology report for Chest x-ray if it is abnormal.      You must understand that you've received an Urgent Care treatment only and that you may be released before all your medical problems are known or treated. You, the patient, will arrange for follow up care as instructed.  If your condition worsens we recommend that you receive another evaluation at the emergency room immediately or contact your primary medical clinics after hours call service to discuss your concerns.  Please return here or go to the Emergency Department for any concerns or worsening of condition.      Managing Fatigue     Family members can help with meals and chores around the house.   Fatigue is common. It can be caused by worry, lack of sleep, or poor appetite. Fatigue can also be a sign of anemia, a shortage of red blood cells. You might need medical treatment for anemia. The tips below can help you feel better.  Conserving energy  · Keep track of the times of day when you are most tired and plan around them. For instance, if you are more tired in the afternoon, try to get tasks done in the morning.  · Decide which tasks are most important. Do those first.  · Pass tasks along to others when you need to. Ask for help.  · Accept help when its offered. Tell people what they can do to help. For instance, you may need someone to fix a meal, fold clothes, or put gas in your car.  · Plan rest times. You may want to take a nap each day. Just sitting quietly for a few minutes can make you feel more rested.  What you can do to feel better  · Relax before you try to sleep. Take a bath or read for a while.  · Form a sleep pattern. Go to bed at the same time each night and get up at the same time each morning.  · Eat well. Choose foods from all of the food groups each day.  · Exercise. Take a brisk walk to help increase your energy.  · Avoid caffeine and alcohol. Drink plenty of  water or fruit juices instead.  Treating anemia  If you begin to feel more tired than normal, tell your doctor. Fatigue could be a sign of anemia. This problem is fairly common in cancer patients, especially during chemotherapy and radiation treatments. If your red blood cell count is too low, you may get a blood transfusion. In some cases, you may need medicine to increase the number of red blood cells your body makes.  When to call your healthcare provider  Call your healthcare provider if you have:  · Shortness of breath or chest pain  · A dizzy feeling when you get up from lying or sitting down  · Paler skin than normal  · Extreme tiredness that is not helped by sleep   Date Last Reviewed: 1/3/2016  © 9426-7292 [employer]. [address] [place] [address], [place] [address]. All rights reserved. This information is not intended as a substitute for professional medical care. Always follow your healthcare professional's instructions.

## 2019-10-21 NOTE — TELEPHONE ENCOUNTER
----- Message from Ammy Sanchez sent at 10/21/2019  8:45 AM CDT -----  Contact: [phone]/self  Type:  Same Day Appointment Request    Caller is requesting a same day appointment.  Caller declined first available appointment listed below.    Name of Caller: self  When is the first available appointment? 12/03  Symptoms: Fatigue with muscle soreness and night sweats. Muscles hurt when you touch them  Best Call Back Number:   Additional Information:  She will see any doctor in the office.

## 2019-10-22 ENCOUNTER — OFFICE VISIT (OUTPATIENT)
Dept: INTERNAL MEDICINE | Facility: CLINIC | Age: 34
End: 2019-10-22
Payer: COMMERCIAL

## 2019-10-22 VITALS
OXYGEN SATURATION: 100 % | HEART RATE: 106 BPM | DIASTOLIC BLOOD PRESSURE: 60 MMHG | SYSTOLIC BLOOD PRESSURE: 104 MMHG | HEIGHT: 64 IN | TEMPERATURE: 98 F | BODY MASS INDEX: 23.69 KG/M2 | WEIGHT: 138.75 LBS

## 2019-10-22 DIAGNOSIS — G93.31 POSTVIRAL SYNDROME: Primary | ICD-10-CM

## 2019-10-22 DIAGNOSIS — M79.10 MYALGIA: ICD-10-CM

## 2019-10-22 DIAGNOSIS — R53.83 FATIGUE, UNSPECIFIED TYPE: ICD-10-CM

## 2019-10-22 DIAGNOSIS — E61.1 IRON DEFICIENCY: ICD-10-CM

## 2019-10-22 LAB
ANA SER QL IF: NORMAL
HETEROPH AB SERPL QL IA: NEGATIVE

## 2019-10-22 PROCEDURE — 86308 HETEROPHILE ANTIBODY SCREEN: CPT

## 2019-10-22 PROCEDURE — 99999 PR PBB SHADOW E&M-EST. PATIENT-LVL IV: CPT | Mod: PBBFAC,,, | Performed by: FAMILY MEDICINE

## 2019-10-22 PROCEDURE — 99999 PR PBB SHADOW E&M-EST. PATIENT-LVL IV: ICD-10-PCS | Mod: PBBFAC,,, | Performed by: FAMILY MEDICINE

## 2019-10-22 PROCEDURE — 3008F PR BODY MASS INDEX (BMI) DOCUMENTED: ICD-10-PCS | Mod: CPTII,S$GLB,, | Performed by: FAMILY MEDICINE

## 2019-10-22 PROCEDURE — 99213 OFFICE O/P EST LOW 20 MIN: CPT | Mod: S$GLB,,, | Performed by: FAMILY MEDICINE

## 2019-10-22 PROCEDURE — 99213 PR OFFICE/OUTPT VISIT, EST, LEVL III, 20-29 MIN: ICD-10-PCS | Mod: S$GLB,,, | Performed by: FAMILY MEDICINE

## 2019-10-22 PROCEDURE — 3008F BODY MASS INDEX DOCD: CPT | Mod: CPTII,S$GLB,, | Performed by: FAMILY MEDICINE

## 2019-10-22 RX ORDER — FERROUS SULFATE 325(65) MG
325 TABLET ORAL 2 TIMES DAILY
Qty: 180 TABLET | Refills: 1 | Status: SHIPPED | OUTPATIENT
Start: 2019-10-22 | End: 2022-02-17

## 2019-10-22 NOTE — LETTER
October 22, 2019      Glory Esparza, NP  [address] [address]           [facility] - Family Medicine/ Internal Medicine  [address] [address]  Phone: [phone]  Fax: [phone]          Patient: Sofi Vidal   MR Number: [id number]   YOB: 1985   Date of Visit: 10/22/2019       Dear Glory Esparza:    Thank you for referring Sofi Vidal to me for evaluation. Attached you will find relevant portions of my assessment and plan of care.    If you have questions, please do not hesitate to call me. I look forward to following Sofi Vidal along with you.    Sincerely,    Rosendo Boss MD    Enclosure  CC:  No Recipients    If you would like to receive this communication electronically, please contact [email or web address] or [phone] to request more information on [facility] Link access.    For providers and/or their staff who would like to refer a patient to [facility], please contact us through our one-stop-shop provider referral line, [facility], at [phone].    If you feel you have received this communication in error or would no longer like to receive these types of communications, please e-mail [email or web address]

## 2019-10-23 ENCOUNTER — TELEPHONE (OUTPATIENT)
Dept: INTERNAL MEDICINE | Facility: CLINIC | Age: 34
End: 2019-10-23

## 2019-10-23 NOTE — TELEPHONE ENCOUNTER
"MyChart message sent.  Please let the patient know their results, thank you:    " Hi Ms. Vidal,    I have reviewed your recent lab result.  The testing for mono is negative.  I would recommend monitoring your symptoms and call the office or your PCP for any changing or worsening condition.  It looks like your appointment with Neurology is not for another month, but hopefully there is a cancellation list that you can be added to.  If you develop any fever, please let us know and we would need to re-evaluate.  Please contact the office if you have any additional questions or concerns.    Rosendo Boss MD      "

## 2019-10-24 ENCOUNTER — HOSPITAL ENCOUNTER (EMERGENCY)
Facility: HOSPITAL | Age: 34
Discharge: HOME | End: 2019-10-24
Attending: EMERGENCY MEDICINE
Payer: COMMERCIAL

## 2019-10-24 ENCOUNTER — PATIENT MESSAGE (OUTPATIENT)
Dept: INTERNAL MEDICINE | Facility: CLINIC | Age: 34
End: 2019-10-24

## 2019-10-24 VITALS
HEIGHT: 64 IN | TEMPERATURE: 99 F | RESPIRATION RATE: 16 BRPM | BODY MASS INDEX: 23.56 KG/M2 | SYSTOLIC BLOOD PRESSURE: 164 MMHG | WEIGHT: 138 LBS | OXYGEN SATURATION: 98 % | HEART RATE: 94 BPM | DIASTOLIC BLOOD PRESSURE: 93 MMHG

## 2019-10-24 DIAGNOSIS — R53.1 WEAKNESS: Primary | ICD-10-CM

## 2019-10-24 DIAGNOSIS — R53.83 FATIGUE: ICD-10-CM

## 2019-10-24 LAB
ALBUMIN SERPL BCP-MCNC: 4.2 G/DL (ref 3.5–5.2)
ALP SERPL-CCNC: 47 U/L (ref 55–135)
ALT SERPL W/O P-5'-P-CCNC: 12 U/L (ref 10–44)
ANION GAP SERPL CALC-SCNC: 11 MMOL/L (ref 8–16)
AST SERPL-CCNC: 17 U/L (ref 10–40)
B-HCG UR QL: NEGATIVE
BASOPHILS # BLD AUTO: 0.05 K/UL (ref 0–0.2)
BASOPHILS NFR BLD: 0.5 % (ref 0–1.9)
BILIRUB SERPL-MCNC: 0.4 MG/DL (ref 0.1–1)
BILIRUB UR QL STRIP: NEGATIVE
BUN SERPL-MCNC: 11 MG/DL (ref 6–20)
CALCIUM SERPL-MCNC: 9.7 MG/DL (ref 8.7–10.5)
CHLORIDE SERPL-SCNC: 102 MMOL/L (ref 95–110)
CK SERPL-CCNC: 25 U/L (ref 20–180)
CLARITY UR REFRACT.AUTO: CLEAR
CO2 SERPL-SCNC: 25 MMOL/L (ref 23–29)
COLOR UR AUTO: COLORLESS
CREAT SERPL-MCNC: 0.7 MG/DL (ref 0.5–1.4)
CRP SERPL-MCNC: 1.1 MG/L (ref 0–8.2)
CTP QC/QA: YES
DIFFERENTIAL METHOD: ABNORMAL
EOSINOPHIL # BLD AUTO: 0.1 K/UL (ref 0–0.5)
EOSINOPHIL NFR BLD: 0.5 % (ref 0–8)
ERYTHROCYTE [DISTWIDTH] IN BLOOD BY AUTOMATED COUNT: 13.2 % (ref 11.5–14.5)
ERYTHROCYTE [SEDIMENTATION RATE] IN BLOOD BY WESTERGREN METHOD: 14 MM/HR (ref 0–36)
EST. GFR  (AFRICAN AMERICAN): >60 ML/MIN/1.73 M^2
EST. GFR  (NON AFRICAN AMERICAN): >60 ML/MIN/1.73 M^2
GLUCOSE SERPL-MCNC: 81 MG/DL (ref 70–110)
GLUCOSE UR QL STRIP: NEGATIVE
HCT VFR BLD AUTO: 40.7 % (ref 37–48.5)
HGB BLD-MCNC: 13 G/DL (ref 12–16)
HGB UR QL STRIP: NEGATIVE
IMM GRANULOCYTES # BLD AUTO: 0.03 K/UL (ref 0–0.04)
IMM GRANULOCYTES NFR BLD AUTO: 0.3 % (ref 0–0.5)
KETONES UR QL STRIP: NEGATIVE
LACTATE SERPL-SCNC: 1.2 MMOL/L (ref 0.5–2.2)
LEUKOCYTE ESTERASE UR QL STRIP: NEGATIVE
LIPASE SERPL-CCNC: 48 U/L (ref 4–60)
LYMPHOCYTES # BLD AUTO: 4.6 K/UL (ref 1–4.8)
LYMPHOCYTES NFR BLD: 47.8 % (ref 18–48)
MAGNESIUM SERPL-MCNC: 2.3 MG/DL (ref 1.6–2.6)
MCH RBC QN AUTO: 27.9 PG (ref 27–31)
MCHC RBC AUTO-ENTMCNC: 31.9 G/DL (ref 32–36)
MCV RBC AUTO: 87 FL (ref 82–98)
MONOCYTES # BLD AUTO: 0.4 K/UL (ref 0.3–1)
MONOCYTES NFR BLD: 4.4 % (ref 4–15)
NEUTROPHILS # BLD AUTO: 4.4 K/UL (ref 1.8–7.7)
NEUTROPHILS NFR BLD: 46.5 % (ref 38–73)
NITRITE UR QL STRIP: NEGATIVE
NRBC BLD-RTO: 0 /100 WBC
PH UR STRIP: 6 [PH] (ref 5–8)
PLATELET # BLD AUTO: 267 K/UL (ref 150–350)
PLATELET BLD QL SMEAR: ABNORMAL
PMV BLD AUTO: 11.5 FL (ref 9.2–12.9)
POTASSIUM SERPL-SCNC: 3.8 MMOL/L (ref 3.5–5.1)
PROT SERPL-MCNC: 7.7 G/DL (ref 6–8.4)
PROT UR QL STRIP: NEGATIVE
RBC # BLD AUTO: 4.66 M/UL (ref 4–5.4)
SODIUM SERPL-SCNC: 138 MMOL/L (ref 136–145)
SP GR UR STRIP: 1 (ref 1–1.03)
TSH SERPL DL<=0.005 MIU/L-ACNC: 2.24 UIU/ML (ref 0.4–4)
URN SPEC COLLECT METH UR: ABNORMAL
WBC # BLD AUTO: 9.56 K/UL (ref 3.9–12.7)

## 2019-10-24 PROCEDURE — 93005 ELECTROCARDIOGRAM TRACING: CPT

## 2019-10-24 PROCEDURE — 85652 RBC SED RATE AUTOMATED: CPT

## 2019-10-24 PROCEDURE — 83605 ASSAY OF LACTIC ACID: CPT

## 2019-10-24 PROCEDURE — 85025 COMPLETE CBC W/AUTO DIFF WBC: CPT

## 2019-10-24 PROCEDURE — 81003 URINALYSIS AUTO W/O SCOPE: CPT

## 2019-10-24 PROCEDURE — 99285 EMERGENCY DEPT VISIT HI MDM: CPT | Mod: 25

## 2019-10-24 PROCEDURE — 83690 ASSAY OF LIPASE: CPT

## 2019-10-24 PROCEDURE — 84443 ASSAY THYROID STIM HORMONE: CPT

## 2019-10-24 PROCEDURE — 93010 EKG 12-LEAD: ICD-10-PCS | Mod: ,,, | Performed by: INTERNAL MEDICINE

## 2019-10-24 PROCEDURE — 86038 ANTINUCLEAR ANTIBODIES: CPT

## 2019-10-24 PROCEDURE — 86225 DNA ANTIBODY NATIVE: CPT

## 2019-10-24 PROCEDURE — 83735 ASSAY OF MAGNESIUM: CPT

## 2019-10-24 PROCEDURE — 93010 ELECTROCARDIOGRAM REPORT: CPT | Mod: ,,, | Performed by: INTERNAL MEDICINE

## 2019-10-24 PROCEDURE — 86140 C-REACTIVE PROTEIN: CPT

## 2019-10-24 PROCEDURE — 99285 PR EMERGENCY DEPT VISIT,LEVEL V: ICD-10-PCS | Mod: ,,, | Performed by: EMERGENCY MEDICINE

## 2019-10-24 PROCEDURE — 80053 COMPREHEN METABOLIC PANEL: CPT

## 2019-10-24 PROCEDURE — 82550 ASSAY OF CK (CPK): CPT

## 2019-10-24 PROCEDURE — 99285 EMERGENCY DEPT VISIT HI MDM: CPT | Mod: ,,, | Performed by: EMERGENCY MEDICINE

## 2019-10-24 PROCEDURE — 81025 URINE PREGNANCY TEST: CPT | Performed by: EMERGENCY MEDICINE

## 2019-10-24 NOTE — ED TRIAGE NOTES
"Sofi Vidal, a 34 y.o. female presents to the ED w/ complaint of generalized weakness and fatigue x2 weeks, this week more intense with dexterity issues (tingling in right hand) and feeling of "weighted blanket." Heaviness in bilateral thighs. Difficulty focusing. Pt reports night sweats.  Received flu shot Oct 1, 2019 and had cold around same time and symptoms have progressed since. Pt received abx. Denies fever, sore throat, CP, SOB, syncope, back pain, rashes,    Triage note:  Chief Complaint   Patient presents with    Fatigue     "I'm feeling extremely weak and fatigued. I'm also having trouble with my dexterity and having a hard time walking. It feels like my body is heavy." Reports being worked up for this over the past week but states she feels like its not getting better. Tearful in triage.      Review of patient's allergies indicates:  No Known Allergies  Past Medical History:   Diagnosis Date    Abnormal cervical Papanicolaou smear 2011, 11-19-12    Colposcopy , HONEY 1    Childhood asthma     Cholelithiasis complicating pregnancy, antepartum      Adult Physical Assessment  LOC: Sofi Vidal, 34 y.o. female verified via two identifiers.  The patient is awake, alert, oriented and speaking appropriately at this time.  APPEARANCE: Patient resting comfortably and appears to be in no acute distress at this time. Patient is clean and well groomed, patient's clothing is properly fastened.  SKIN:The skin is warm and dry, color consistent with ethnicity, patient has normal skin turgor and moist mucus membranes, skin intact, no breakdown or brusing noted.  MUSCULOSKELETAL: Patient moving all extremities well, no obvious swelling or deformities noted. Weakness and fatigue  RESPIRATORY: Airway is open and patent, respirations are spontaneous, patient has a normal effort and rate, no accessory muscle use noted.  CARDIAC: Patient has a normal rate and rhythm, no periphreal edema noted in any extremity, " capillary refill < 3 seconds in all extremities  ABDOMEN: Soft and non tender to palpation, no abdominal distention noted. Bowel sounds present in all four quadrants.  NEUROLOGIC: Eyes open spontaneously, behavior appropriate to situation, follows commands, facial expression symmetrical, bilateral hand grasp equal and even, purposeful motor response noted, normal sensation in all extremities when touched with a finger.

## 2019-10-24 NOTE — ED PROVIDER NOTES
"Encounter Date: 10/24/2019    SCRIBE #1 NOTE: I, Jordan Alexander, am scribing for, and in the presence of,  Dr. Buchanan. I have scribed the following portions of the note - the EKG reading. Other sections scribed: HPI, ROS.       History     Chief Complaint   Patient presents with    Fatigue     "I'm feeling extremely weak and fatigued. I'm also having trouble with my dexterity and having a hard time walking. It feels like my body is heavy." Reports being worked up for this over the past week but states she feels like its not getting better. Tearful in triage.      Patient is a 34 year old female presenting with fatigue. Ever since she had her flu shot, she has noted increased weakness and fatigue over the last few weeks. She describes it as a tingling sensation. Also notes a loss of dexterity such as an inability to  a pen. In regards to her fatigue, she states Hard to lift legs. Describes it as a weighted blanket. No weakness to one side of the body. She also notes that she started a birth control. Last normal menstrual period was 11th. Her past medical history includes cholelithiasis and childhood asthma.     The history is provided by the patient.     Review of patient's allergies indicates:  No Known Allergies  Past Medical History:   Diagnosis Date    Abnormal cervical Papanicolaou smear 2011, 11-19-12    Colposcopy , HONEY 1    Childhood asthma     Cholelithiasis complicating pregnancy, antepartum      Past Surgical History:   Procedure Laterality Date    COLPOSCOPY      LASIK       Family History   Problem Relation Age of Onset    Breast cancer Maternal Grandmother 68    Cancer Maternal Grandmother     Colon cancer Neg Hx     Ovarian cancer Neg Hx      Social History     Tobacco Use    Smoking status: Never Smoker    Smokeless tobacco: Never Used   Substance Use Topics    Alcohol use: Yes     Comment: rare    Drug use: No     Review of Systems   Constitutional: Positive for activity change " (decreased) and diaphoresis (past sunday). Negative for appetite change, chills, fever and unexpected weight change.   HENT: Negative for nosebleeds, sore throat and trouble swallowing.    Eyes: Negative for photophobia and visual disturbance.   Respiratory: Negative for cough and shortness of breath.    Gastrointestinal: Positive for nausea. Negative for vomiting.   Genitourinary: Negative for difficulty urinating and dysuria.   Musculoskeletal: Positive for neck stiffness (tightness). Negative for back pain and joint swelling.   Skin: Negative for rash and wound.   Neurological: Positive for headaches (Tension to the back of her head). Negative for syncope.   All other systems reviewed and are negative.      Physical Exam     Initial Vitals [10/24/19 1708]   BP Pulse Resp Temp SpO2   (!) 164/93 94 16 98.6 °F (37 °C) 98 %      MAP       --         Physical Exam    Nursing note and vitals reviewed.  Constitutional: She is cooperative. She appears distressed.   HENT:   Head: Normocephalic and atraumatic.   Mouth/Throat: Oropharynx is clear and moist.   Eyes: Conjunctivae and lids are normal.   Neck: Normal range of motion. Neck supple.   Cardiovascular: Normal rate, regular rhythm and normal heart sounds.   Pulmonary/Chest: Breath sounds normal. No accessory muscle usage. No respiratory distress.   Abdominal: She exhibits no distension.   Neurological: She is alert.         ED Course   Procedures  Labs Reviewed   CBC W/ AUTO DIFFERENTIAL - Abnormal; Notable for the following components:       Result Value    Mean Corpuscular Hemoglobin Conc 31.9 (*)     All other components within normal limits    Narrative:     shared lavender   COMPREHENSIVE METABOLIC PANEL - Abnormal; Notable for the following components:    Alkaline Phosphatase 47 (*)     All other components within normal limits    Narrative:     shared lavender   URINALYSIS, REFLEX TO URINE CULTURE - Abnormal; Notable for the following components:    Color, UA  Colorless (*)     All other components within normal limits    Narrative:     Preferred Collection Type->Urine, Clean Catch   TSH    Narrative:     shared lavender   SEDIMENTATION RATE    Narrative:     shared lavender   C-REACTIVE PROTEIN    Narrative:     shared lavender   LIPASE    Narrative:     shared lavender   MAGNESIUM    Narrative:     shared lavender   ANTI-DNA ANTIBODY, DOUBLE-STRANDED   CHRIS PROFILE I (SCREEN)   CK    Narrative:     shared lavender   PROTIME-INR   LACTIC ACID, PLASMA   POCT URINE PREGNANCY     EKG Readings: (Independently Interpreted)   Initial Reading: No STEMI. Rhythm: Normal Sinus Rhythm. Heart Rate: 80.     ECG Results          EKG 12-lead (Final result)  Result time 10/25/19 13:26:02    Final result by Interface, Lab In [facility] (10/25/19 13:26:02)                 Narrative:    Test Reason : R53.8    Vent. Rate : 080 BPM     Atrial Rate : 080 BPM     P-R Int : 134 ms          QRS Dur : 076 ms      QT Int : 354 ms       P-R-T Axes : 073 047 027 degrees     QTc Int : 408 ms    Normal sinus rhythm  Normal ECG  When compared with ECG of 13-DEC-2017 12:39,  No significant change was found  Confirmed by ANYA DOLAN MD ([id number]) on 10/25/2019 1:25:54 PM    Referred By: [email or web address]   SELF           Confirmed By:ANYA DOLAN MD                            Imaging Results          MRI Brain Without Contrast (Final result)  Result time 10/24/19 20:23:37    Final result by Heber Centeno MD (10/24/19 20:23:37)                 Impression:      No acute intracranial findings.  No hemorrhage or acute infarct.    Electronically signed by resident: Goyo Rodriguez  Date:    10/24/2019  Time:    19:57    Electronically signed by: Heber Centeno MD  Date:    10/24/2019  Time:    20:23             Narrative:    EXAMINATION:  MRI BRAIN WITHOUT CONTRAST    CLINICAL HISTORY:  Facial muscle weakness/paralysis;    TECHNIQUE:  Multiplanar multisequence MR imaging of the brain was performed without  contrast.    COMPARISON:  Outside MRI/MRA brain from 01/26/2018.    FINDINGS:  Ventricles and sulci appear are normal in size and contour.  No hydrocephalus.    Brain parenchyma appears within normal limits.  No hemorrhage, edema, mass, or acute infarct.    No extra-axial blood or fluid collections.    Normal T2 flow voids are well preserved.    Normal T1 bone marrow signal.                               X-Ray Chest PA And Lateral (Final result)  Result time 10/24/19 19:13:29    Final result by Taye Arce MD (10/24/19 19:13:29)                 Impression:      No acute cardiopulmonary process.      Electronically signed by: Taye Arce MD  Date:    10/24/2019  Time:    19:13             Narrative:    EXAMINATION:  XR CHEST PA AND LATERAL    CLINICAL HISTORY:  Chest Pain;    TECHNIQUE:  PA and lateral views of the chest were performed.    COMPARISON:  10/21/2019    FINDINGS:  There is no consolidation, effusion, or pneumothorax.  Cardiomediastinal silhouette is unremarkable.  Regional osseous structures are unremarkable.                                 Medical Decision Making:   History:   Old Medical Records: I decided to obtain old medical records.  Initial Assessment:   Patient complaining of significant fatigue doing more difficult to care for the children is more concerned about this increasing weakness  Clinical Tests:   Lab Tests: Ordered and Reviewed  Radiological Study: Ordered and Reviewed  ED Management:  Will do labs MRI of brain await results            Scribe Attestation:   Scribe #1: I performed the above scribed service and the documentation accurately describes the services I performed. I attest to the accuracy of the note.    Attending Attestation:             Attending ED Notes:   Patient with complaints of worsening weakness having more difficulty with ADLs caring for children labs have been drawn with no findings noted MRI done no signs of MS patient is reassured but needs to follow up  with her primary care physician for further workup             Clinical Impression:       ICD-10-CM ICD-9-CM   1. Weakness R53.1 780.79   2. Fatigue R53.83 780.79         Disposition:   Disposition: Discharged  Condition: Stable                        Jaylon Soler MD  10/26/19 1424

## 2019-10-25 ENCOUNTER — OFFICE VISIT (OUTPATIENT)
Dept: NEUROLOGY | Facility: CLINIC | Age: 34
End: 2019-10-25
Payer: COMMERCIAL

## 2019-10-25 VITALS
DIASTOLIC BLOOD PRESSURE: 72 MMHG | HEIGHT: 64 IN | HEART RATE: 96 BPM | BODY MASS INDEX: 23.56 KG/M2 | SYSTOLIC BLOOD PRESSURE: 102 MMHG | WEIGHT: 138 LBS

## 2019-10-25 DIAGNOSIS — R53.83 FATIGUE, UNSPECIFIED TYPE: Primary | ICD-10-CM

## 2019-10-25 LAB
ANA SER QL IF: NORMAL
DSDNA AB SER-ACNC: NORMAL [IU]/ML

## 2019-10-25 PROCEDURE — 99214 PR OFFICE/OUTPT VISIT, EST, LEVL IV, 30-39 MIN: ICD-10-PCS | Mod: S$GLB,,, | Performed by: PSYCHIATRY & NEUROLOGY

## 2019-10-25 PROCEDURE — 3008F PR BODY MASS INDEX (BMI) DOCUMENTED: ICD-10-PCS | Mod: CPTII,S$GLB,, | Performed by: PSYCHIATRY & NEUROLOGY

## 2019-10-25 PROCEDURE — 99999 PR PBB SHADOW E&M-EST. PATIENT-LVL III: ICD-10-PCS | Mod: PBBFAC,,, | Performed by: PSYCHIATRY & NEUROLOGY

## 2019-10-25 PROCEDURE — 99214 OFFICE O/P EST MOD 30 MIN: CPT | Mod: S$GLB,,, | Performed by: PSYCHIATRY & NEUROLOGY

## 2019-10-25 PROCEDURE — 99999 PR PBB SHADOW E&M-EST. PATIENT-LVL III: CPT | Mod: PBBFAC,,, | Performed by: PSYCHIATRY & NEUROLOGY

## 2019-10-25 PROCEDURE — 3008F BODY MASS INDEX DOCD: CPT | Mod: CPTII,S$GLB,, | Performed by: PSYCHIATRY & NEUROLOGY

## 2019-10-25 NOTE — LETTER
October 25, 2019      Rosendo Boss MD  [address]  [place] [address]           [facility] Neurology  [address]  [facility] [address]  Phone: [phone]  Fax: [phone]          Patient: Sofi Vidal   MR Number: [id number]   YOB: 1985   Date of Visit: 10/25/2019       Dear Dr. Rosendo Boss:    Thank you for referring Sofi Vidal to me for evaluation. Attached you will find relevant portions of my assessment and plan of care.    If you have questions, please do not hesitate to call me. I look forward to following Sofi Vidal along with you.    Sincerely,    Jerrell Leon MD    Enclosure  CC:  No Recipients    If you would like to receive this communication electronically, please contact [email or web address] or [phone] to request more information on [employer] access.    For providers and/or their staff who would like to refer a patient to [facility], please contact us through our one-stop-shop provider referral line, [facility], at [phone].    If you feel you have received this communication in error or would no longer like to receive these types of communications, please e-mail [email or web address]

## 2019-10-25 NOTE — PROGRESS NOTES
"[facility] - NEUROLOGY  [facility], [place] [facility]    Date: October 25, 2019   Patient Name: Sofi Vidal   MRN: [id number]   PCP: Cheri June  Referring Provider: Rosendo Boss MD    Assessment:      This is Sofi Vidal, 34 y.o. female with likely post-viral syndrome, patient counseled regarding this, will rule out primary muscle pathology, currently with normal CK and exam.     Plan:      -  EMG  -  Labs    Follow up as needed       I discussed side effects of the medications. I asked the patient to  stop the medication if she notices serious adverse effects as we discussed and to seek immediate medical attention at an ER.     Jerrell Leon MD  [facility]   Department of Neurology    Subjective:      HPI:   Ms. Sofi Vidal is a 34 y.o. female who presents with a chief complaint of myalgias and fatigue    Patient reports receiving flu shot on 10/1 and noting sinus infection two days later.  Following this she developed diffuse myalgias and fatigue which have been severe enough to interfere with her ability to take care of her child.  Symptoms wax and wane somewhat but have been present continuously for the past three weeks.  She describes on instance in which she acutely felt "drunk" and had difficulty with coordination which dissipated slowly over the course of the day.  She has had extensive serologic work up with finding of low Fe and has started supplementation.      PAST MEDICAL HISTORY:  Past Medical History:   Diagnosis Date    Abnormal cervical Papanicolaou smear 2011, 11-19-12    Colposcopy , HONEY 1    Childhood asthma     Cholelithiasis complicating pregnancy, antepartum        PAST SURGICAL HISTORY:  Past Surgical History:   Procedure Laterality Date    COLPOSCOPY      LASIK         CURRENT MEDS:  Current Outpatient Medications   Medication Sig Dispense Refill    ferrous sulfate (FEOSOL) 325 mg (65 mg iron) Tab tablet Take 1 tablet (325 " "mg total) by mouth 2 (two) times daily. 180 tablet 1    norgestimate-ethinyl estradiol (ORTHO TRI-CYCLEN, 28,) 0.18/0.215/0.25 mg-35 mcg (28) tablet Take 1 tablet by mouth once daily. 28 tablet 12     No current facility-administered medications for this visit.        ALLERGIES:  Review of patient's allergies indicates:  No Known Allergies    FAMILY HISTORY:  Family History   Problem Relation Age of Onset    Breast cancer Maternal Grandmother 68    Cancer Maternal Grandmother     Colon cancer Neg Hx     Ovarian cancer Neg Hx        SOCIAL HISTORY:  Social History     Tobacco Use    Smoking status: Never Smoker    Smokeless tobacco: Never Used   Substance Use Topics    Alcohol use: Yes     Comment: rare    Drug use: No       Review of Systems:  12 review of systems is negative except for the symptoms mentioned in HPI.        Objective:     Vitals:    10/25/19 1542   BP: 102/72   Pulse: 96   Weight: 62.6 kg (138 lb)   Height: 5' 4" (1.626 m)       General: NAD, well nourished   Eyes: no tearing, discharge, no erythema   ENT: moist mucous membranes of the oral cavity, nares patent    Neck: Supple, full range of motion  Cardiovascular: Warm and well perfused, pulses equal and symmetrical  Lungs: Normal work of breathing, normal chest wall excursions  Skin: No rash, lesions, or breakdown on exposed skin  Psychiatry: Mood and affect are appropriate   Abdomen: soft, non tender, non distended  Extremeties: No cyanosis, clubbing or edema.    Neurological   MENTAL STATUS: Alert and oriented to person, place, and time. Attention and concentration within normal limits. Speech without dysarthria, able to name and repeat without difficulty. Recent and remote memory within normal limits   CRANIAL NERVES: Visual fields intact. PERRL. EOMI. Facial sensation intact. Face symmetrical. Hearing grossly intact. Full shoulder shrug bilaterally. Tongue protrudes midline   SENSORY: Sensation is intact to light touch throughout. " Negative Romberg.   MOTOR: Normal bulk and tone. No pronator drift.  5/5 deltoid, biceps, triceps, interosseous, hand  bilaterally. 5/5 iliopsoas, knee extension/flexion, foot dorsi/plantarflexion bilaterally.    REFLEXES: Symmetric and 2+ throughout.   CEREBELLAR/COORDINATION/GAIT: Gait steady with normal arm swing and stride length.  Heel to shin intact. Finger to nose intact. Normal rapid alternating movements.

## 2019-10-25 NOTE — TELEPHONE ENCOUNTER
Spoke to patient.     Went to ER, MRI normal, evaluated in Neurology. Symptoms are improving today.

## 2019-11-22 ENCOUNTER — LAB VISIT (OUTPATIENT)
Dept: LAB | Facility: HOSPITAL | Age: 34
End: 2019-11-22
Attending: INTERNAL MEDICINE
Payer: COMMERCIAL

## 2019-11-22 DIAGNOSIS — E61.1 IRON DEFICIENCY: ICD-10-CM

## 2019-11-22 DIAGNOSIS — R53.83 FATIGUE, UNSPECIFIED TYPE: ICD-10-CM

## 2019-11-22 LAB
25(OH)D3+25(OH)D2 SERPL-MCNC: 29 NG/ML (ref 30–96)
BASOPHILS # BLD AUTO: 0.03 K/UL (ref 0–0.2)
BASOPHILS NFR BLD: 0.6 % (ref 0–1.9)
DIFFERENTIAL METHOD: ABNORMAL
EOSINOPHIL # BLD AUTO: 0.1 K/UL (ref 0–0.5)
EOSINOPHIL NFR BLD: 1.2 % (ref 0–8)
ERYTHROCYTE [DISTWIDTH] IN BLOOD BY AUTOMATED COUNT: 13.3 % (ref 11.5–14.5)
FERRITIN SERPL-MCNC: 14 NG/ML (ref 20–300)
HCT VFR BLD AUTO: 38.9 % (ref 37–48.5)
HGB BLD-MCNC: 12 G/DL (ref 12–16)
IMM GRANULOCYTES # BLD AUTO: 0.01 K/UL (ref 0–0.04)
IMM GRANULOCYTES NFR BLD AUTO: 0.2 % (ref 0–0.5)
IRON SERPL-MCNC: 30 UG/DL (ref 30–160)
LYMPHOCYTES # BLD AUTO: 2.1 K/UL (ref 1–4.8)
LYMPHOCYTES NFR BLD: 40.3 % (ref 18–48)
MCH RBC QN AUTO: 28.1 PG (ref 27–31)
MCHC RBC AUTO-ENTMCNC: 30.8 G/DL (ref 32–36)
MCV RBC AUTO: 91 FL (ref 82–98)
MONOCYTES # BLD AUTO: 0.3 K/UL (ref 0.3–1)
MONOCYTES NFR BLD: 5.9 % (ref 4–15)
NEUTROPHILS # BLD AUTO: 2.7 K/UL (ref 1.8–7.7)
NEUTROPHILS NFR BLD: 51.8 % (ref 38–73)
NRBC BLD-RTO: 0 /100 WBC
PLATELET # BLD AUTO: 222 K/UL (ref 150–350)
PMV BLD AUTO: 11.5 FL (ref 9.2–12.9)
RBC # BLD AUTO: 4.27 M/UL (ref 4–5.4)
SATURATED IRON: 8 % (ref 20–50)
TOTAL IRON BINDING CAPACITY: 364 UG/DL (ref 250–450)
TRANSFERRIN SERPL-MCNC: 246 MG/DL (ref 200–375)
WBC # BLD AUTO: 5.11 K/UL (ref 3.9–12.7)

## 2019-11-22 PROCEDURE — 83540 ASSAY OF IRON: CPT

## 2019-11-22 PROCEDURE — 82306 VITAMIN D 25 HYDROXY: CPT

## 2019-11-22 PROCEDURE — 82728 ASSAY OF FERRITIN: CPT

## 2019-11-22 PROCEDURE — 85025 COMPLETE CBC W/AUTO DIFF WBC: CPT

## 2019-11-22 PROCEDURE — 36415 COLL VENOUS BLD VENIPUNCTURE: CPT | Mod: PO

## 2019-12-25 ENCOUNTER — PATIENT MESSAGE (OUTPATIENT)
Dept: NEUROLOGY | Facility: CLINIC | Age: 34
End: 2019-12-25

## 2019-12-26 ENCOUNTER — PATIENT MESSAGE (OUTPATIENT)
Dept: INTERNAL MEDICINE | Facility: CLINIC | Age: 34
End: 2019-12-26

## 2020-01-06 ENCOUNTER — PATIENT MESSAGE (OUTPATIENT)
Dept: INTERNAL MEDICINE | Facility: CLINIC | Age: 35
End: 2020-01-06

## 2020-01-07 ENCOUNTER — OFFICE VISIT (OUTPATIENT)
Dept: INTERNAL MEDICINE | Facility: CLINIC | Age: 35
End: 2020-01-07
Payer: COMMERCIAL

## 2020-01-07 VITALS
TEMPERATURE: 98 F | WEIGHT: 141.13 LBS | HEART RATE: 88 BPM | BODY MASS INDEX: 24.1 KG/M2 | HEIGHT: 64 IN | RESPIRATION RATE: 18 BRPM | DIASTOLIC BLOOD PRESSURE: 78 MMHG | SYSTOLIC BLOOD PRESSURE: 120 MMHG

## 2020-01-07 DIAGNOSIS — R53.83 FATIGUE, UNSPECIFIED TYPE: ICD-10-CM

## 2020-01-07 DIAGNOSIS — M79.10 MUSCLE PAIN: Primary | ICD-10-CM

## 2020-01-07 DIAGNOSIS — R53.1 WEAKNESS: ICD-10-CM

## 2020-01-07 PROCEDURE — 3008F BODY MASS INDEX DOCD: CPT | Mod: CPTII,S$GLB,, | Performed by: INTERNAL MEDICINE

## 2020-01-07 PROCEDURE — 99999 PR PBB SHADOW E&M-EST. PATIENT-LVL III: ICD-10-PCS | Mod: PBBFAC,,, | Performed by: INTERNAL MEDICINE

## 2020-01-07 PROCEDURE — 99214 PR OFFICE/OUTPT VISIT, EST, LEVL IV, 30-39 MIN: ICD-10-PCS | Mod: S$GLB,,, | Performed by: INTERNAL MEDICINE

## 2020-01-07 PROCEDURE — 99214 OFFICE O/P EST MOD 30 MIN: CPT | Mod: S$GLB,,, | Performed by: INTERNAL MEDICINE

## 2020-01-07 PROCEDURE — 99999 PR PBB SHADOW E&M-EST. PATIENT-LVL III: CPT | Mod: PBBFAC,,, | Performed by: INTERNAL MEDICINE

## 2020-01-07 PROCEDURE — 3008F PR BODY MASS INDEX (BMI) DOCUMENTED: ICD-10-PCS | Mod: CPTII,S$GLB,, | Performed by: INTERNAL MEDICINE

## 2020-01-07 NOTE — TELEPHONE ENCOUNTER
Spoke to patient. Problems went away for a few weeks but returned.    Reports hand weakness and tingling, arm heaviness and decrease in strength.     Will make appt 1/7/20 @ 11:40am.

## 2020-01-07 NOTE — PROGRESS NOTES
Subjective:       Patient ID: Sofi Vidal is a 34 y.o. female who presents for Muscle Pain      Muscle Pain   This is a recurrent problem. The current episode started more than 1 month ago. The problem occurs intermittently. The problem has been waxing and waning. Associated symptoms include fatigue. Pertinent negatives include no abdominal pain, arthralgias, chest pain, chills, congestion, coughing, diaphoresis, fever, headaches, myalgias, nausea, numbness, rash, swollen glands or vomiting. Nothing aggravates the symptoms. She has tried nothing for the symptoms.      Patient received flu shot on 10/01/2019 in shortly afterwards, she developed an upper respiratory infection. She was treated with an antibiotic and a steroid.  She then developed extreme upper and lower extremity fatigue and muscle pain.  Symptoms worsened and she went to the ER on 10/24/19. Her workup included an MRI of the brain and it was negative.  Symptoms improved for only a brief time and in the last few days have returned.      Review of Systems   Constitutional: Positive for activity change (She is unable to care for her son at times due to weakness and fatigue), fatigue and unexpected weight change. Negative for chills, diaphoresis and fever.        She follows a gluten free diet due to her son's dietary intolerances   HENT: Negative for congestion, ear discharge, ear pain, mouth sores, rhinorrhea and sinus pressure.    Eyes: Negative for discharge and redness.   Respiratory: Negative for cough, chest tightness and shortness of breath.    Cardiovascular: Negative for chest pain and palpitations.   Gastrointestinal: Positive for constipation. Negative for abdominal pain, diarrhea, nausea and vomiting.   Endocrine: Negative for cold intolerance and heat intolerance.   Musculoskeletal: Negative for arthralgias and myalgias.   Skin: Negative for rash and wound.   Neurological: Negative for dizziness, tremors, numbness and headaches.    Hematological: Negative for adenopathy.   Psychiatric/Behavioral: Negative for dysphoric mood. The patient is nervous/anxious.        Objective:      Physical Exam   Constitutional: She is oriented to person, place, and time. Vital signs are normal. She appears well-developed and well-nourished. No distress.   HENT:   Head: Normocephalic and atraumatic.   Right Ear: Hearing and external ear normal.   Left Ear: Hearing and external ear normal.   Nose: Nose normal.   Mouth/Throat: Uvula is midline and mucous membranes are normal.   Eyes: Conjunctivae and lids are normal.   Cardiovascular: Normal rate, regular rhythm, normal heart sounds and intact distal pulses.   No murmur heard.  Pulmonary/Chest: Effort normal and breath sounds normal. She has no wheezes.   Abdominal: Soft. Bowel sounds are normal. She exhibits no distension. There is no tenderness.   Musculoskeletal: Normal range of motion. She exhibits no edema.   Lymphadenopathy:        Head (right side): No submandibular, no preauricular and no posterior auricular adenopathy present.        Head (left side): No submandibular, no preauricular and no posterior auricular adenopathy present.     She has no cervical adenopathy.   Neurological: She is alert and oriented to person, place, and time. She displays no atrophy. No sensory deficit. Gait normal.   Reflex Scores:       Bicep reflexes are 2+ on the right side and 2+ on the left side.       Patellar reflexes are 2+ on the right side and 2+ on the left side.  Muscles in lower legs and upper arms are very tender to palpation   Skin: Skin is warm, dry and intact. No rash noted. She is not diaphoretic.   Psychiatric: She has a normal mood and affect.   Vitals reviewed.      Assessment/Plan:       1. Muscle pain  - Vitamin D; Future  - Aldolase; Future  - CBC auto differential; Future  - CK; Future  - Reticulocytes; Future  - VITAMIN B1; Future  - MYOSITIS ASSESSR PLUS COLT-1; Future  - Vitamin B12  - VITAMIN  B1    2. Fatigue, unspecified type  - Vitamin D; Future  - CBC auto differential; Future  - Folate; Future  - Homocysteine, serum  - Methylmalonic acid, serum  - Ferritin  - Iron and TIBC; Future  - TSH; Future    3. Weakness  - CK; Future  - Vitamin B12; Future      Cheri June MD  Internal Medicine, [facility]

## 2020-01-08 ENCOUNTER — PATIENT MESSAGE (OUTPATIENT)
Dept: INTERNAL MEDICINE | Facility: CLINIC | Age: 35
End: 2020-01-08

## 2020-01-08 DIAGNOSIS — M79.10 MYALGIA: Primary | ICD-10-CM

## 2020-01-09 LAB
BASOPHILS # BLD AUTO: 59 CELLS/UL (ref 0–200)
BASOPHILS NFR BLD AUTO: 0.9 %
CK SERPL-CCNC: 34 U/L (ref 29–143)
EOSINOPHIL # BLD AUTO: 172 CELLS/UL (ref 15–500)
EOSINOPHIL NFR BLD AUTO: 2.6 %
ERYTHROCYTE [DISTWIDTH] IN BLOOD BY AUTOMATED COUNT: 13 % (ref 11–15)
HCT VFR BLD AUTO: 37.3 % (ref 35–45)
HGB BLD-MCNC: 12.2 G/DL (ref 11.7–15.5)
IRON SATN MFR SERPL: 13 % (CALC) (ref 16–45)
IRON SERPL-MCNC: 37 MCG/DL (ref 40–190)
LYMPHOCYTES # BLD AUTO: 2845 CELLS/UL (ref 850–3900)
LYMPHOCYTES NFR BLD AUTO: 43.1 %
MCH RBC QN AUTO: 28.2 PG (ref 27–33)
MCHC RBC AUTO-ENTMCNC: 32.7 G/DL (ref 32–36)
MCV RBC AUTO: 86.3 FL (ref 80–100)
MONOCYTES # BLD AUTO: 370 CELLS/UL (ref 200–950)
MONOCYTES NFR BLD AUTO: 5.6 %
NEUTROPHILS # BLD AUTO: 3155 CELLS/UL (ref 1500–7800)
NEUTROPHILS NFR BLD AUTO: 47.8 %
PLATELET # BLD AUTO: 206 THOUSAND/UL (ref 140–400)
PMV BLD REES-ECKER: 11.8 FL (ref 7.5–12.5)
RBC # BLD AUTO: 4.32 MILLION/UL (ref 3.8–5.1)
RETICS #: NORMAL CELLS/UL (ref 20000–80000)
RETICS/RBC NFR AUTO: 0.9 %
TIBC SERPL-MCNC: 294 MCG/DL (CALC) (ref 250–450)
WBC # BLD AUTO: 6.6 THOUSAND/UL (ref 3.8–10.8)

## 2020-01-10 LAB
ALDOLASE SERPL-CCNC: 5.5 U/L
FOLATE SERPL-MCNC: 14 NG/ML

## 2020-01-11 LAB
FERRITIN SERPL-MCNC: 28 NG/ML (ref 16–154)
HCYS SERPL-SCNC: 5.5 UMOL/L
METHYLMALONATE SERPL-SCNC: 207 NMOL/L (ref 87–318)

## 2020-01-12 LAB
TSH SERPL-ACNC: 1.79 MIU/L
VIT B1 BLD-SCNC: 104 NMOL/L (ref 78–185)
VIT B12 SERPL-MCNC: 500 PG/ML (ref 200–1100)

## 2020-01-16 NOTE — TELEPHONE ENCOUNTER
Spoke to patient about the results that are available.    Today, she has muscular back pain, shoulder pain and hand pain.    Some tests were ordered @ [facility] but were not done.    Please find out of any tests are still being processed. There are no results for myositis antibodies and vitamin D filipe.

## 2020-01-21 ENCOUNTER — PROCEDURE VISIT (OUTPATIENT)
Dept: NEUROLOGY | Facility: CLINIC | Age: 35
End: 2020-01-21
Payer: COMMERCIAL

## 2020-01-21 DIAGNOSIS — R53.83 FATIGUE, UNSPECIFIED TYPE: ICD-10-CM

## 2020-01-21 PROBLEM — E55.9 VITAMIN D INSUFFICIENCY: Status: ACTIVE | Noted: 2020-01-21

## 2020-01-21 PROCEDURE — 95911 PR NERVE CONDUCTION STUDY; 9-10 STUDIES: ICD-10-PCS | Mod: S$GLB,,, | Performed by: PSYCHIATRY & NEUROLOGY

## 2020-01-21 PROCEDURE — 95886 MUSC TEST DONE W/N TEST COMP: CPT | Mod: S$GLB,,, | Performed by: PSYCHIATRY & NEUROLOGY

## 2020-01-21 PROCEDURE — 95911 NRV CNDJ TEST 9-10 STUDIES: CPT | Mod: S$GLB,,, | Performed by: PSYCHIATRY & NEUROLOGY

## 2020-01-21 PROCEDURE — 95886 PR EMG COMPLETE, W/ NERVE CONDUCTION STUDIES, 5+ MUSCLES: ICD-10-PCS | Mod: S$GLB,,, | Performed by: PSYCHIATRY & NEUROLOGY

## 2020-01-21 RX ORDER — ERGOCALCIFEROL 1.25 MG/1
50000 CAPSULE ORAL
Qty: 4 CAPSULE | Refills: 0 | Status: SHIPPED | OUTPATIENT
Start: 2020-01-21 | End: 2020-02-19

## 2020-01-26 LAB
25(OH)D3 SERPL-MCNC: 21 NG/ML (ref 30–100)
ALBUMIN SERPL-MCNC: 4.5 G/DL (ref 3.6–5.1)
ALBUMIN/GLOB SERPL: 2 (CALC) (ref 1–2.5)
ALP SERPL-CCNC: 67 U/L (ref 33–115)
ALT SERPL-CCNC: 21 U/L (ref 6–29)
AST SERPL-CCNC: 18 U/L (ref 10–30)
BILIRUB SERPL-MCNC: 0.4 MG/DL (ref 0.2–1.2)
BUN SERPL-MCNC: 10 MG/DL (ref 7–25)
BUN/CREAT SERPL: NORMAL (CALC) (ref 6–22)
CALCIUM SERPL-MCNC: 9.4 MG/DL (ref 8.6–10.2)
CHLORIDE SERPL-SCNC: 107 MMOL/L (ref 98–110)
CO2 SERPL-SCNC: 29 MMOL/L (ref 20–32)
CREAT SERPL-MCNC: 0.64 MG/DL (ref 0.5–1.1)
CRP SERPL-MCNC: 2.4 MG/L
EJ AB SER QL: NOT DETECTED
ENA JO1 AB SER IA-ACNC: NORMAL AI
ERYTHROCYTE [SEDIMENTATION RATE] IN BLOOD BY WESTERGREN METHOD: 2 MM/H
GFRSERPLBLD MDRD-ARVRAT: 115 ML/MIN/1.73M2
GLOBULIN SER CALC-MCNC: 2.2 G/DL (CALC) (ref 1.9–3.7)
GLUCOSE SERPL-MCNC: 67 MG/DL (ref 65–139)
KU AB SER QL: NOT DETECTED
MI2 AB SER QL: NOT DETECTED
OJ AB SER QL: NOT DETECTED
PL12 AB SER QL: NOT DETECTED
PL7 AB SER QL: NOT DETECTED
POTASSIUM SERPL-SCNC: 4.3 MMOL/L (ref 3.5–5.3)
PROT SERPL-MCNC: 6.7 G/DL (ref 6.1–8.1)
SODIUM SERPL-SCNC: 143 MMOL/L (ref 135–146)
SRP AB SERPL QL: NOT DETECTED

## 2020-02-04 ENCOUNTER — OFFICE VISIT (OUTPATIENT)
Dept: PSYCHIATRY | Facility: CLINIC | Age: 35
End: 2020-02-04
Payer: COMMERCIAL

## 2020-02-04 VITALS
WEIGHT: 144.38 LBS | BODY MASS INDEX: 24.65 KG/M2 | HEART RATE: 83 BPM | DIASTOLIC BLOOD PRESSURE: 59 MMHG | SYSTOLIC BLOOD PRESSURE: 100 MMHG | HEIGHT: 64 IN

## 2020-02-04 DIAGNOSIS — F41.9 ANXIETY, MILD: Primary | ICD-10-CM

## 2020-02-04 PROCEDURE — 3008F PR BODY MASS INDEX (BMI) DOCUMENTED: ICD-10-PCS | Mod: CPTII,S$GLB,, | Performed by: STUDENT IN AN ORGANIZED HEALTH CARE EDUCATION/TRAINING PROGRAM

## 2020-02-04 PROCEDURE — 99999 PR PBB SHADOW E&M-EST. PATIENT-LVL II: ICD-10-PCS | Mod: PBBFAC,,, | Performed by: STUDENT IN AN ORGANIZED HEALTH CARE EDUCATION/TRAINING PROGRAM

## 2020-02-04 PROCEDURE — 99203 PR OFFICE/OUTPT VISIT, NEW, LEVL III, 30-44 MIN: ICD-10-PCS | Mod: S$GLB,,, | Performed by: STUDENT IN AN ORGANIZED HEALTH CARE EDUCATION/TRAINING PROGRAM

## 2020-02-04 PROCEDURE — 3008F BODY MASS INDEX DOCD: CPT | Mod: CPTII,S$GLB,, | Performed by: STUDENT IN AN ORGANIZED HEALTH CARE EDUCATION/TRAINING PROGRAM

## 2020-02-04 PROCEDURE — 99999 PR PBB SHADOW E&M-EST. PATIENT-LVL II: CPT | Mod: PBBFAC,,, | Performed by: STUDENT IN AN ORGANIZED HEALTH CARE EDUCATION/TRAINING PROGRAM

## 2020-02-04 PROCEDURE — 99203 OFFICE O/P NEW LOW 30 MIN: CPT | Mod: S$GLB,,, | Performed by: STUDENT IN AN ORGANIZED HEALTH CARE EDUCATION/TRAINING PROGRAM

## 2020-02-04 NOTE — PROGRESS NOTES
"2020   Sofi Vidal  : 1985  MRN: [id number]    Psychiatry Clinic Initial Evaluation  ?      Subjective:    HPI:    Sofi Vidal is a 34 y.o. female with no past psychiatric history of Depression and Anxiety  who presented to clinic on 2020 to establish care.     New medical problems/medications:   none      Taking the following psychiatric medications:   none      Today pt reports that she does struggle with anxiety for "quite some years". Pt describes her anxiety as an overwhelming feeling in her chest, inability to rest at night secondary to anxious thoughts, and numerating. Pt lost her  to cancer in  and had a 16 month old at the time. 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 She was depressed during that time. In 2019 pt experienced [place] with the inability to walk. She reports that this experience increased her anxiety with fear that all illnesses would be associated with "the worst outcome". Pt did have a minor relapse in some of the symptoms this past December. Today pt feels better. She does not endorse feeling anxious on a daily basis, but after questioning does endorse daily underlying thoughts that correlate with anxiety. She describes herself as "just dealing with it". Pt believes her anxiety began when her late  fell ill. Pt denies depressive symptoms or episodes.     Pt was previously in therapy following the death of her  for grief about 1.5 years. Since that time her insurance has changed and she has yet to restart therapy. Pt reports that she does not sleep well and gets about 6 hours of sleep nightly. Pt reports that it is the hardest to get to sleep before 12 am, but once asleep, she is able to get up. Pt reports stable appetite. Pt denies SI/HI/AVH.      Past Psychiatric Medications:  Lorazepam- once from a friend after her  passed        Psychiatric Review of " Systems:  sleep: no  appetite: no  weight: no  energy/anergy: no  interest/pleasure/anhedonia: no  somatic symptoms: no  guilty/hopelessness: no  concentration: no  S.I.B.s/risky behavior: no  SI/SA:  no    ?  Review of systems:  Constitutional: no fatigue or  weight change  Respiratory: shortness of breath, cough  Gastrointestinal: no abdominal pain, no nausea, no constipation, no diarrhea  Musculoskeletal: no myalgias, no arthralgias  Neurologic: no seizures, no headaches        History:    Medical/Surgical History:  Past Medical History:   Diagnosis Date    Abnormal cervical Papanicolaou smear 2011, 11-19-12    Colposcopy , HONEY 1    Childhood asthma     Cholelithiasis complicating pregnancy, antepartum        Past Surgical History:   Procedure Laterality Date    COLPOSCOPY      LASIK           Past Psychiatric History:  Previous Diagnoses: none     Reports no  episodes of at least 4 days of concurrent increased energy and decreased need for sleep, manifesting in racing thoughts, distractibility, pressured speech, increased goal directed activity, increased risky behaviors, and either elevated or irritable mood during periods of sustained sobriety.    Reports no  episodes of loss of touch with reality manifesting with hallucinations, delusions, disorganized thought process or behavior, and/or negative symptoms     Previous Medication Trials: no  Previous Psychiatric Hospitalizations:no  Previous Suicide Attempts: no  History of Violence: no  Outpatient psychiatrist: no    Social History:  Born and raised: [place], went to [employer] [employer]  Marital Status:   Children: 1 five year old son  Other significant relationships: family support network  Employment Status/Info: currently , has her own [employer]   Education: Communications Bachelors  Special Ed: no  Legal stressors/past charges: no  Hobbies: makes jewelry, hang out with friends, spend time with her son  Housing Status: with her  "son  History of phys/sexual abuse: no  Easy access to gun: no    Substance Abuse History:  Tobacco Use:no  Use of Alcohol: occasional, social use  Recreational Drugs: THC when a teenager  Rehab History:no    Family Psychiatric History:   hx anxiety, depression, and bipolar in mother    Objective:    Current Medications:  Current Outpatient Medications on File Prior to Visit   Medication Sig Dispense Refill    ergocalciferol (ERGOCALCIFEROL) 50,000 unit Cap Take 1 capsule (50,000 Units total) by mouth every 7 days. 4 capsule 0    ferrous sulfate (FEOSOL) 325 mg (65 mg iron) Tab tablet Take 1 tablet (325 mg total) by mouth 2 (two) times daily. 180 tablet 1    norgestimate-ethinyl estradiol (ORTHO TRI-CYCLEN, 28,) 0.18/0.215/0.25 mg-35 mcg (28) tablet Take 1 tablet by mouth once daily. 28 tablet 12     No current facility-administered medications on file prior to visit.        Allergies:  Patient has no known allergies.    Vital Signs:  Vitals:    02/04/20 0926   BP: (!) 100/59   Pulse: 83       Body mass index is 24.79 kg/m².      General Physical:  Motor: normal bulk and tone, grossly intact  Gait/Station: normal    Mental Status Exam:  Appearance/Behavior: appears stated age, fair self care, engaged, good eye contact, no abnormal involuntary movements  Speech:  normal tone, normal rate, normal pitch, normal volume  Language: english, fluent, without gross idiosyncrasies  Mood and affect: "good"   Affect:  mood congruent, normal range, appropirate to situation  Thought Process: normal and logical  Associations:  intact  Thought Content/Perceptual disturbances: normal:88853::"normal, no suicidality, no homicidality, delusions, or paranoia"}  Sensorium/Orientation: appropriate for age/education.  Attention/Concentration:  intact to interview  Recent/Remote Memory: intact to recent medical events  Fund of Knowledge: appears adequate, consistent with education  Insight:  Intact, patient has awareness of " illness  Judgment: Intact  ?    Laboratory Data:  Labs reviewed, including but not limited to:   Recent Labs   Lab 01/08/20  1608 01/08/20  1613 01/20/20  1325   WBC  --  6.6  --    Hemoglobin  --  12.2  --    Hematocrit  --  37.3  --    Mean Corpuscular Volume  --  86.3  --    Platelets  --  206  --    Sodium  --   --  143   Potassium  --   --  4.3   Chloride  --   --  107   Creatinine  --   --  0.64   BUN, Bld  --   --  10   AST  --   --  18   ALT  --   --  21   Albumin  --   --  4.5   TSH 1.79  --   --      Hemoglobin A1C   Date Value Ref Range Status   12/13/2017 4.8 4.0 - 5.6 % Final     Comment:     According to ADA guidelines, hemoglobin A1c <7.0% represents  optimal control in non-pregnant diabetic patients. Different  metrics may apply to specific patient populations.   Standards of Medical Care in Diabetes-2016.  For the purpose of screening for the presence of diabetes:  <5.7%     Consistent with the absence of diabetes  5.7-6.4%  Consistent with increasing risk for diabetes   (prediabetes)  >or=6.5%  Consistent with diabetes  Currently, no consensus exists for use of hemoglobin A1c  for diagnosis of diabetes for children.  This Hemoglobin A1c assay has significant interference with fetal   hemoglobin   (HbF). The results are invalid for patients with abnormal amounts of   HbF,   including those with known Hereditary Persistence   of Fetal Hemoglobin. Heterozygous hemoglobin variants (HbAS, HbAC,   HbAD, HbAE, HbA2) do not significantly interfere with this assay;   however, presence of multiple variants in a sample may impact the %   interference.       Recent Labs   Lab 09/24/19  0817   Cholesterol 219 H   LDL Cholesterol 115.4   HDL 90 H   Triglycerides 68       Imaging:  Pertinent imaging reviewed, including but not limited to:      Assessment:    Sofi Vidal is a 34 y.o. female with reported  past psychiatric history of anxiety and depression who presented to clinic on 2/4/2020 to establish care.  Pt reports that after her  passed of cancer, she did have increased anxiety and deprssion present. Since that time, she has improved but wanted to be evaluated by psychiatry for appropriate treatment options as she desired not to take a meditation if not needed.       Anxiety, NOS    Plan:    - Pt does not meet criteria for CHELSEA and at this time symptoms do not appear to meet the severity of requiring medication management  - Encouraged to initiate psychotherapy here at [facility] for mood symptoms. Pt agrees to this plan      RTC if symptoms worsen or desire to initiate medication    The potential risks, benefits, alternatives, and inherent unpredictabilities of management were discussed with the patient.  Policies of confidentiality, late policy/therapeutic hour, refill policy, clinic contact, and ED presentation criteria were discussed with the patient.  All voiced questions were answered.    Case discussed with staff psychiatrist, Darren Menard MD.  ?    ?  Kaleigh Li MD  [facility]  PGY-3

## 2020-02-17 ENCOUNTER — OFFICE VISIT (OUTPATIENT)
Dept: PSYCHIATRY | Facility: CLINIC | Age: 35
End: 2020-02-17
Payer: COMMERCIAL

## 2020-02-17 DIAGNOSIS — F41.1 GENERALIZED ANXIETY DISORDER: Primary | ICD-10-CM

## 2020-02-17 PROCEDURE — 90791 PSYCH DIAGNOSTIC EVALUATION: CPT | Mod: S$GLB,,, | Performed by: SOCIAL WORKER

## 2020-02-17 PROCEDURE — 90791 PR PSYCHIATRIC DIAGNOSTIC EVALUATION: ICD-10-PCS | Mod: S$GLB,,, | Performed by: SOCIAL WORKER

## 2020-02-17 NOTE — PROGRESS NOTES
Psychiatry Initial Visit (PhD/LCSW)  Diagnostic Interview - CPT 18821    Date: 2/17/2020    [facility]    [facility]erral source: self     Clinical status of patient: Outpatient    Sofi Vidal, a 34 y.o. female, for initial evaluation visit.  Met with patient.    Chief complaint/reason for encounter: anxiety    History of present illness:    Anxiety since  got sick. (since 2014 with the Muslim of 's illness).       Pain: noncontributory    Symptoms:   · Mood: may feel sad sometimes and definitely less than half the time, much less.  Cries once a week.   Usually related to her .    It was a very good marriage.    No suicidal ideation and no hx of attempt and no access to firearms.  Concentration is somewhat scattered.    Always been this way but had A and B.   Well behaved.     Motivation is good.   self critical since always.   No bingeing or purging.     · Anxiety:     Sleepy around 5-6 pm may fall asleep once a week.   Goes to bed by 10 pm or later and falling asleep around midnight and waking up around 6am.   She does get out of bed soon after this.   She sleeps well but some dreaming and some waking but also able to sleep most nights.  Energy is improving.  She has no social anxiety.      · Muscle tension, worrier.  Irritable, restless.   Only two panic attacks in life.  No trauma.   No flashbacks or nightmares over the painful last two days of 's life.     · No rituals.    · Substance abuse: denied  · Cognitive functioning: denied  · Health behaviors: noncontributory    Psychiatric history:  2016  May.    therapy off and on for one and half year.  no medications.    Grief therapy.       Medical history: none    Family history of psychiatric illness: mother depression/anxiety    Other relatives for depression.    Aunts and uncles substance.     Social history (marriage, employment, etc.):      dx in 2014 with lymphoma cancer.    Son is 5.    She is open to dating but  has not found someone.   She owns jewelry line and going well.    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   And has been working on this for years before.     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   Started dating him in 2008.   Pt was 22.  She knew him already.      Trying to exercise 30 minutes a day.  Started about one month ago at few times a week.     Helps with energy and anxiety.    Oct 2019 flu shot 3 weeks later felt fatigue sinus infection muscle weakness could barely walk for a few days.  She recovered but returned with lesser degree of intensity and she finally feels her old self again.     Has done yoga and meditation at some point in the past.    Son Samantha    [place].        Substance use:   Alcohol: a few times a month.    Drugs: none   Tobacco: none   Caffeine: 1-2 day      Current medications and drug reactions (include OTC, herbal): see medication list     Strengths and liabilities: Strength: Patient accepts guidance/feedback, Strength: Patient is expressive/articulate., Strength: Patient is motivated for change.    Current Evaluation:     Mental Status Exam:  General Appearance:  unremarkable, age appropriate   Speech: normal tone, normal rate, normal pitch, normal volume      Level of Cooperation: cooperative      Thought Processes: normal and logical   Mood: anxious      Thought Content: normal, no suicidality, no homicidality, delusions, or paranoia   Affect: congruent and appropriate   Orientation: Oriented x3   Memory: recent >  intact   Attention Span & Concentration: intact   Fund of General Knowledge: intact and appropriate to age and level of education   Abstract Reasoning: interpretation of similarities was abstract, interpretation of proverbs was abstract   Judgment & Insight: good     Language  intact     Diagnostic Impression - Plan:       ICD-10-CM ICD-9-CM   1. Generalized anxiety disorder F41.1 300.02       Plan:individual psychotherapy    Return to Clinic:  as scheduled    Length of Service (minutes): 45

## 2020-02-19 RX ORDER — ERGOCALCIFEROL 1.25 MG/1
CAPSULE ORAL
Qty: 4 CAPSULE | Refills: 0 | Status: SHIPPED | OUTPATIENT
Start: 2020-02-19 | End: 2022-02-17

## 2020-02-20 ENCOUNTER — TELEPHONE (OUTPATIENT)
Dept: INTERNAL MEDICINE | Facility: CLINIC | Age: 35
End: 2020-02-20

## 2020-07-09 ENCOUNTER — OFFICE VISIT (OUTPATIENT)
Dept: OBSTETRICS AND GYNECOLOGY | Facility: CLINIC | Age: 35
End: 2020-07-09
Payer: COMMERCIAL

## 2020-07-09 VITALS
BODY MASS INDEX: 23.51 KG/M2 | SYSTOLIC BLOOD PRESSURE: 118 MMHG | DIASTOLIC BLOOD PRESSURE: 76 MMHG | WEIGHT: 137.69 LBS | HEIGHT: 64 IN

## 2020-07-09 DIAGNOSIS — Z01.419 ENCOUNTER FOR GYNECOLOGICAL EXAMINATION WITHOUT ABNORMAL FINDING: Primary | ICD-10-CM

## 2020-07-09 PROCEDURE — 99999 PR PBB SHADOW E&M-EST. PATIENT-LVL III: ICD-10-PCS | Mod: PBBFAC,,, | Performed by: OBSTETRICS & GYNECOLOGY

## 2020-07-09 PROCEDURE — 99999 PR PBB SHADOW E&M-EST. PATIENT-LVL III: CPT | Mod: PBBFAC,,, | Performed by: OBSTETRICS & GYNECOLOGY

## 2020-07-09 PROCEDURE — 88175 CYTOPATH C/V AUTO FLUID REDO: CPT

## 2020-07-09 PROCEDURE — 99395 PR PREVENTIVE VISIT,EST,18-39: ICD-10-PCS | Mod: S$GLB,,, | Performed by: OBSTETRICS & GYNECOLOGY

## 2020-07-09 PROCEDURE — 99395 PREV VISIT EST AGE 18-39: CPT | Mod: S$GLB,,, | Performed by: OBSTETRICS & GYNECOLOGY

## 2020-07-09 NOTE — PROGRESS NOTES
"CC: Well woman exam    Sofi Vidal is a 34 y.o. female  presents for a well woman exam.    She had flu shot last year- OCT 19.  Had viral sx.  Was given steroids and Augmentin.  She had muscle weakness and tenderness.  Could barely walk  She was tested for MS.    She has muscle weakness off and on.  Normal cycles.  With no birth control.       Past Medical History:   Diagnosis Date    Abnormal cervical Papanicolaou smear 12    Colposcopy , HONEY 1    Childhood asthma     Cholelithiasis complicating pregnancy, antepartum        Past Surgical History:   Procedure Laterality Date    COLPOSCOPY      LASIK         OB History    Para Term  AB Living   1 1 0 0 0 1   SAB TAB Ectopic Multiple Live Births   0 0 0 0        # Outcome Date GA Lbr Mahin/2nd Weight Sex Delivery Anes PTL Lv   1 Para                Family History   Problem Relation Age of Onset    Breast cancer Maternal Grandmother 68    Lung cancer Maternal Grandmother     Parkinsonism Paternal Grandmother     Diabetes Maternal Aunt     Colon cancer Neg Hx     Ovarian cancer Neg Hx        Social History     Tobacco Use    Smoking status: Never Smoker    Smokeless tobacco: Never Used   Substance Use Topics    Alcohol use: Yes     Comment: rare    Drug use: No       /76   Ht 5' 4" (1.626 m)   Wt 62.5 kg (137 lb 10.8 oz)   LMP 2020 (Exact Date)   BMI 23.63 kg/m²     ROS:  GENERAL: Denies weight gain or weight loss. Feeling well overall.   SKIN: Denies rash or lesions.   HEAD: Denies head injury or headache.   NODES: Denies enlarged lymph nodes.   CHEST: Denies chest pain or shortness of breath.   CARDIOVASCULAR: Denies palpitations or left sided chest pain.   ABDOMEN: No abdominal pain, constipation, diarrhea, nausea, vomiting or rectal bleeding.   URINARY: No frequency, dysuria, hematuria, or burning on urination.  REPRODUCTIVE: See HPI.   BREASTS: The patient performs breast self-examination and denies " pain, lumps, or nipple discharge.   HEMATOLOGIC: No easy bruisability or excessive bleeding.  MUSCULOSKELETAL: Denies joint pain or swelling.   NEUROLOGIC: Denies syncope or weakness.   PSYCHIATRIC: Denies depression, anxiety or mood swings.    Physical Exam:    APPEARANCE: Well nourished, well developed, in no acute distress.  AFFECT: WNL, alert and oriented x 3  SKIN: No acne or hirsutism  NECK: Neck symmetric without masses or thyromegaly  NODES: No inguinal, cervical, axillary, or femoral lymph node enlargement  CHEST: Good respiratory effect  ABDOMEN: Soft.  No tenderness or masses.  No hepatosplenomegaly.  No hernias.  BREASTS: Symmetrical, no skin changes or visible lesions.  No palpable masses, nipple discharge bilaterally.  PELVIC: Normal external genitalia without lesions.  Normal hair distribution.  Adequate perineal body, normal urethral meatus.  Vagina moist and well rugated without lesions or discharge.  Cervix pink, without lesions, discharge or tenderness.  No significant cystocele or rectocele.  Bimanual exam shows uterus to be normal size, regular, mobile and nontender.  Adnexa without masses or tenderness.    EXTREMITIES: No edema.    ASSESSMENT AND PLAN  1. Encounter for gynecological examination without abnormal finding  Liquid-Based Pap Smear, Screening    Liquid-Based Pap Smear, Screening       Patient was counseled today on A.C.S. Pap guidelines and recommendations for yearly pelvic exams, mammograms and monthly self breast exams; to see her PCP for other health maintenance.     F/U PRN  Follow up with PCP and possible rheumatology for possible work up for muscle and painful joint symptoms

## 2020-07-17 LAB
FINAL PATHOLOGIC DIAGNOSIS: NORMAL
Lab: NORMAL

## 2020-09-30 ENCOUNTER — PATIENT OUTREACH (OUTPATIENT)
Dept: ADMINISTRATIVE | Facility: OTHER | Age: 35
End: 2020-09-30

## 2020-10-23 ENCOUNTER — OFFICE VISIT (OUTPATIENT)
Dept: DERMATOLOGY | Facility: CLINIC | Age: 35
End: 2020-10-23
Payer: COMMERCIAL

## 2020-10-23 DIAGNOSIS — L81.4 LENTIGO: ICD-10-CM

## 2020-10-23 DIAGNOSIS — L82.1 SK (SEBORRHEIC KERATOSIS): ICD-10-CM

## 2020-10-23 DIAGNOSIS — Z12.83 SCREENING EXAM FOR SKIN CANCER: ICD-10-CM

## 2020-10-23 DIAGNOSIS — D22.9 MULTIPLE BENIGN NEVI: Primary | ICD-10-CM

## 2020-10-23 PROCEDURE — 99203 PR OFFICE/OUTPT VISIT, NEW, LEVL III, 30-44 MIN: ICD-10-PCS | Mod: S$GLB,,, | Performed by: DERMATOLOGY

## 2020-10-23 PROCEDURE — 99203 OFFICE O/P NEW LOW 30 MIN: CPT | Mod: S$GLB,,, | Performed by: DERMATOLOGY

## 2020-10-23 PROCEDURE — 99999 PR PBB SHADOW E&M-EST. PATIENT-LVL III: ICD-10-PCS | Mod: PBBFAC,,, | Performed by: DERMATOLOGY

## 2020-10-23 PROCEDURE — 99999 PR PBB SHADOW E&M-EST. PATIENT-LVL III: CPT | Mod: PBBFAC,,, | Performed by: DERMATOLOGY

## 2020-10-23 NOTE — PROGRESS NOTES
Subjective:       Patient ID:  Sofi Vidal is a 35 y.o. female who presents for   Chief Complaint   Patient presents with    Skin Check     TBSE      Patient here for Total Body Skin Exam    no - personal history of MM  no - family history of MM  yes - childhood blistering sunburns  Yes (many as teen) - tanning bed use    Patient with new complaint of lesion(s)  Location: neck  Duration: 1 year  Symptoms: raised  Relieving factors/Previous treatments: none          Review of Systems   Skin: Positive for daily sunscreen use and activity-related sunscreen use. Negative for recent sunburn.   Hematologic/Lymphatic: Does not bruise/bleed easily.        Objective:    Physical Exam   Constitutional: She appears well-developed and well-nourished. No distress.   Neurological: She is alert and oriented to person, place, and time. She is not disoriented.   Psychiatric: She has a normal mood and affect.   Skin:   Areas Examined (abnormalities noted in diagram):   Scalp / Hair Palpated and Inspected  Head / Face Inspection Performed  Neck Inspection Performed  Chest / Axilla Inspection Performed  Abdomen Inspection Performed  Genitals / Buttocks / Groin Inspection Performed  Back Inspection Performed  RUE Inspected  LUE Inspection Performed  RLE Inspected  LLE Inspection Performed  Nails and Digits Inspection Performed                       Diagram Legend     Erythematous scaling macule/papule c/w actinic keratosis       Vascular papule c/w angioma      Pigmented verrucoid papule/plaque c/w seborrheic keratosis      Yellow umbilicated papule c/w sebaceous hyperplasia      Irregularly shaped tan macule c/w lentigo     1-2 mm smooth white papules consistent with Milia      Movable subcutaneous cyst with punctum c/w epidermal inclusion cyst      Subcutaneous movable cyst c/w pilar cyst      Firm pink to brown papule c/w dermatofibroma      Pedunculated fleshy papule(s) c/w skin tag(s)      Evenly pigmented macule c/w  junctional nevus     Mildly variegated pigmented, slightly irregular-bordered macule c/w mildly atypical nevus      Flesh colored to evenly pigmented papule c/w intradermal nevus       Pink pearly papule/plaque c/w basal cell carcinoma      Erythematous hyperkeratotic cursted plaque c/w SCC      Surgical scar with no sign of skin cancer recurrence      Open and closed comedones      Inflammatory papules and pustules      Verrucoid papule consistent consistent with wart     Erythematous eczematous patches and plaques     Dystrophic onycholytic nail with subungual debris c/w onychomycosis     Umbilicated papule    Erythematous-base heme-crusted tan verrucoid plaque consistent with inflamed seborrheic keratosis     Erythematous Silvery Scaling Plaque c/w Psoriasis     See annotation              Assessment / Plan:        Multiple benign nevi  total body skin examination performed today including at least 12 points as noted in physical examination. No lesions suspicious for malignancy noted.  Reassurance provided.  Instructed patient to observe lesion(s) for changes and follow up in clinic if changes are noted. Discussed ABCDE's of moles and brochure provided.    Patient with 1 mildly atypical nevus lateral thigh (pic above). Instructed patient to observe lesion(s) for changes and follow up in clinic if changes are noted. Discussed ABCDE's of moles and brochure provided.    SK (seborrheic keratosis)  These are benign inherited growths without a malignant potential. Reassurance given to patient. No treatment is necessary.       Lentigo  This is a benign hyperpigmented sun induced lesion. Daily sun protection will reduce the number of new lesions. Treatment of these benign lesions are considered cosmetic.      Screening exam for skin cancer  Total body skin examination performed today including at least 12 points as noted in physical examination. No lesions suspicious for malignancy noted.               No follow-ups on  file.

## 2020-12-09 ENCOUNTER — OFFICE VISIT (OUTPATIENT)
Dept: URGENT CARE | Facility: CLINIC | Age: 35
End: 2020-12-09
Payer: COMMERCIAL

## 2020-12-09 VITALS
HEART RATE: 78 BPM | TEMPERATURE: 97 F | WEIGHT: 135 LBS | DIASTOLIC BLOOD PRESSURE: 74 MMHG | OXYGEN SATURATION: 100 % | BODY MASS INDEX: 23.05 KG/M2 | HEIGHT: 64 IN | SYSTOLIC BLOOD PRESSURE: 111 MMHG

## 2020-12-09 DIAGNOSIS — R53.83 FATIGUE, UNSPECIFIED TYPE: Primary | ICD-10-CM

## 2020-12-09 DIAGNOSIS — B34.9 VIRAL SYNDROME: ICD-10-CM

## 2020-12-09 LAB
CTP QC/QA: YES
SARS-COV-2 RDRP RESP QL NAA+PROBE: NEGATIVE

## 2020-12-09 PROCEDURE — 99214 OFFICE O/P EST MOD 30 MIN: CPT | Mod: S$GLB,,, | Performed by: INTERNAL MEDICINE

## 2020-12-09 PROCEDURE — 3008F BODY MASS INDEX DOCD: CPT | Mod: CPTII,S$GLB,, | Performed by: INTERNAL MEDICINE

## 2020-12-09 PROCEDURE — 99214 PR OFFICE/OUTPT VISIT, EST, LEVL IV, 30-39 MIN: ICD-10-PCS | Mod: S$GLB,,, | Performed by: INTERNAL MEDICINE

## 2020-12-09 PROCEDURE — 3008F PR BODY MASS INDEX (BMI) DOCUMENTED: ICD-10-PCS | Mod: CPTII,S$GLB,, | Performed by: INTERNAL MEDICINE

## 2020-12-09 PROCEDURE — U0002 COVID-19 LAB TEST NON-CDC: HCPCS | Mod: QW,S$GLB,, | Performed by: INTERNAL MEDICINE

## 2020-12-09 PROCEDURE — U0002: ICD-10-PCS | Mod: QW,S$GLB,, | Performed by: INTERNAL MEDICINE

## 2020-12-09 NOTE — PROGRESS NOTES
"Subjective:       Patient ID: Sofi Vidal is a 35 y.o. female.    Vitals:  height is 5' 4" (1.626 m) and weight is 61.2 kg (135 lb). Her temperature is 97.4 °F (36.3 °C). Her blood pressure is 111/74 and her pulse is 78. Her oxygen saturation is 100%.     Chief Complaint: COVID-19 Concerns and URI    URI   This is a new problem. The current episode started today. The problem has been unchanged. There has been no fever. Associated symptoms include neck pain and a sore throat. Pertinent negatives include no abdominal pain, chest pain, congestion, coughing, diarrhea, dysuria, ear pain, headaches, joint pain, joint swelling, nausea, plugged ear sensation, rash, rhinorrhea, sinus pain, sneezing, swollen glands, vomiting or wheezing.       Constitution: Positive for fatigue. Negative for chills, sweating and fever.   HENT: Positive for sore throat. Negative for ear pain, congestion, sinus pain, sinus pressure and voice change.    Neck: Positive for neck pain. Negative for painful lymph nodes.   Cardiovascular: Negative for chest pain.   Eyes: Negative for eye redness.   Respiratory: Negative for chest tightness, cough, sputum production, bloody sputum, COPD, shortness of breath, stridor, wheezing and asthma.    Gastrointestinal: Negative for abdominal pain, nausea, vomiting and diarrhea.   Genitourinary: Negative for dysuria.   Musculoskeletal: Positive for muscle ache.   Skin: Negative for rash.   Allergic/Immunologic: Negative for seasonal allergies, asthma and sneezing.   Neurological: Negative for headaches.   Hematologic/Lymphatic: Negative for swollen lymph nodes.       Objective:      Physical Exam   Constitutional: normal  HENT:   Head: Normocephalic and atraumatic.   Nose: Mucosal edema and rhinorrhea present.   Mouth/Throat: Mucous membranes are moist. Oropharynx is clear.   Eyes: Pupils are equal, round, and reactive to light. Conjunctivae are normal. extraocular movement intact  Neck: Normal range of " motion. Neck supple.   Cardiovascular: Normal rate, regular rhythm, normal heart sounds and normal pulses.   Pulmonary/Chest: Effort normal and breath sounds normal.   Abdominal: Normal appearance.   Neurological: She is alert.   Nursing note and vitals reviewed.        Assessment:       1. Fatigue, unspecified type    2. Viral syndrome        Plan:         Fatigue, unspecified type  -     POCT COVID-19 Rapid Screening    Viral syndrome

## 2021-01-10 ENCOUNTER — PATIENT MESSAGE (OUTPATIENT)
Dept: OBSTETRICS AND GYNECOLOGY | Facility: CLINIC | Age: 36
End: 2021-01-10

## 2021-01-13 ENCOUNTER — LAB VISIT (OUTPATIENT)
Dept: LAB | Facility: HOSPITAL | Age: 36
End: 2021-01-13
Attending: NURSE PRACTITIONER
Payer: COMMERCIAL

## 2021-01-13 ENCOUNTER — OFFICE VISIT (OUTPATIENT)
Dept: OBSTETRICS AND GYNECOLOGY | Facility: CLINIC | Age: 36
End: 2021-01-13
Payer: COMMERCIAL

## 2021-01-13 VITALS
DIASTOLIC BLOOD PRESSURE: 70 MMHG | BODY MASS INDEX: 23.73 KG/M2 | WEIGHT: 138.25 LBS | SYSTOLIC BLOOD PRESSURE: 116 MMHG

## 2021-01-13 DIAGNOSIS — N89.8 VAGINAL ODOR: ICD-10-CM

## 2021-01-13 DIAGNOSIS — Z11.3 SCREEN FOR STD (SEXUALLY TRANSMITTED DISEASE): ICD-10-CM

## 2021-01-13 DIAGNOSIS — N76.0 ACUTE VAGINITIS: ICD-10-CM

## 2021-01-13 DIAGNOSIS — Z11.3 SCREEN FOR STD (SEXUALLY TRANSMITTED DISEASE): Primary | ICD-10-CM

## 2021-01-13 PROCEDURE — 1126F PR PAIN SEVERITY QUANTIFIED, NO PAIN PRESENT: ICD-10-PCS | Mod: S$GLB,,, | Performed by: NURSE PRACTITIONER

## 2021-01-13 PROCEDURE — 99213 OFFICE O/P EST LOW 20 MIN: CPT | Mod: S$GLB,,, | Performed by: NURSE PRACTITIONER

## 2021-01-13 PROCEDURE — 86592 SYPHILIS TEST NON-TREP QUAL: CPT

## 2021-01-13 PROCEDURE — 3008F BODY MASS INDEX DOCD: CPT | Mod: CPTII,S$GLB,, | Performed by: NURSE PRACTITIONER

## 2021-01-13 PROCEDURE — 99999 PR PBB SHADOW E&M-EST. PATIENT-LVL III: ICD-10-PCS | Mod: PBBFAC,,, | Performed by: NURSE PRACTITIONER

## 2021-01-13 PROCEDURE — 3008F PR BODY MASS INDEX (BMI) DOCUMENTED: ICD-10-PCS | Mod: CPTII,S$GLB,, | Performed by: NURSE PRACTITIONER

## 2021-01-13 PROCEDURE — 99213 PR OFFICE/OUTPT VISIT, EST, LEVL III, 20-29 MIN: ICD-10-PCS | Mod: S$GLB,,, | Performed by: NURSE PRACTITIONER

## 2021-01-13 PROCEDURE — 99999 PR PBB SHADOW E&M-EST. PATIENT-LVL III: CPT | Mod: PBBFAC,,, | Performed by: NURSE PRACTITIONER

## 2021-01-13 PROCEDURE — 80074 ACUTE HEPATITIS PANEL: CPT

## 2021-01-13 PROCEDURE — 1126F AMNT PAIN NOTED NONE PRSNT: CPT | Mod: S$GLB,,, | Performed by: NURSE PRACTITIONER

## 2021-01-13 PROCEDURE — 86703 HIV-1/HIV-2 1 RESULT ANTBDY: CPT

## 2021-01-13 PROCEDURE — 36415 COLL VENOUS BLD VENIPUNCTURE: CPT | Mod: PN

## 2021-01-13 RX ORDER — FLUCONAZOLE 150 MG/1
150 TABLET ORAL ONCE
Qty: 2 TABLET | Refills: 1 | Status: SHIPPED | OUTPATIENT
Start: 2021-01-13 | End: 2021-01-13

## 2021-01-13 RX ORDER — METRONIDAZOLE 7.5 MG/G
1 GEL VAGINAL DAILY
Qty: 70 G | Refills: 4 | Status: SHIPPED | OUTPATIENT
Start: 2021-01-13 | End: 2021-01-18

## 2021-01-14 LAB
HAV IGM SERPL QL IA: NEGATIVE
HBV CORE IGM SERPL QL IA: NEGATIVE
HBV SURFACE AG SERPL QL IA: NEGATIVE
HCV AB SERPL QL IA: NEGATIVE
HIV 1+2 AB+HIV1 P24 AG SERPL QL IA: NEGATIVE
RPR SER QL: NORMAL

## 2021-01-16 LAB
CANDIDA RRNA VAG QL PROBE: NEGATIVE
G VAGINALIS RRNA GENITAL QL PROBE: POSITIVE
T VAGINALIS RRNA GENITAL QL PROBE: NEGATIVE

## 2021-01-17 LAB
C TRACH RRNA SPEC QL NAA+PROBE: NEGATIVE
N GONORRHOEA RRNA SPEC QL NAA+PROBE: NEGATIVE

## 2021-04-05 ENCOUNTER — PATIENT MESSAGE (OUTPATIENT)
Dept: ADMINISTRATIVE | Facility: HOSPITAL | Age: 36
End: 2021-04-05

## 2021-04-13 ENCOUNTER — PATIENT MESSAGE (OUTPATIENT)
Dept: INTERNAL MEDICINE | Facility: CLINIC | Age: 36
End: 2021-04-13

## 2021-04-16 ENCOUNTER — PATIENT MESSAGE (OUTPATIENT)
Dept: RESEARCH | Facility: HOSPITAL | Age: 36
End: 2021-04-16

## 2021-04-25 ENCOUNTER — CLINICAL SUPPORT (OUTPATIENT)
Dept: URGENT CARE | Facility: CLINIC | Age: 36
End: 2021-04-25
Payer: COMMERCIAL

## 2021-04-25 DIAGNOSIS — Z20.822 COVID-19 RULED OUT: Primary | ICD-10-CM

## 2021-04-25 LAB
CTP QC/QA: YES
SARS-COV-2 RDRP RESP QL NAA+PROBE: NEGATIVE

## 2021-04-25 PROCEDURE — 99211 OFF/OP EST MAY X REQ PHY/QHP: CPT | Mod: S$GLB,CS,, | Performed by: PHYSICIAN ASSISTANT

## 2021-04-25 PROCEDURE — U0002: ICD-10-PCS | Mod: QW,S$GLB,, | Performed by: PHYSICIAN ASSISTANT

## 2021-04-25 PROCEDURE — U0002 COVID-19 LAB TEST NON-CDC: HCPCS | Mod: QW,S$GLB,, | Performed by: PHYSICIAN ASSISTANT

## 2021-04-25 PROCEDURE — 99211 PR OFFICE/OUTPT VISIT, EST, LEVL I: ICD-10-PCS | Mod: S$GLB,CS,, | Performed by: PHYSICIAN ASSISTANT

## 2021-07-06 ENCOUNTER — PATIENT MESSAGE (OUTPATIENT)
Dept: ADMINISTRATIVE | Facility: HOSPITAL | Age: 36
End: 2021-07-06

## 2021-10-04 ENCOUNTER — PATIENT MESSAGE (OUTPATIENT)
Dept: ADMINISTRATIVE | Facility: HOSPITAL | Age: 36
End: 2021-10-04

## 2021-10-29 NOTE — PROGRESS NOTES
History   Chief Complaint:  Chest pain.      The history is provided by the patient.      Sandhya Trujillo is a 61 year old female on Eliquis with history of hypertension, irregular heart beat, morbid obesity, MS, pulmonary embolism, thyroid disease, acute renal failure, and aortic atherosclerosis who presents with chest pain. The patient reports six weeks of intermittent fever, with the highest reading being 102, and with her temperature last night being 100.8. She also complains of a month of hematuria and dysuria, for which she was recently seen by her primary and underwent CT (see results below). She also endorses recent nausea, weakness, right-sided abdominal pain, weight gain, and shortness of breath. She comes to the ED today for 2 weeks of intermittent chest pain that radiates up into her shoulder. These episodes will last only a few minutes, without any associated triggers, nor any alleviating or exacerbating symptoms. The patient denies any cough, diaphoresis, or vomiting. Compliment with medications. Uses a walker and stair lift at home.    CT Urogram without and with contrast - 10/29/21  1. 3 mm stone at the ureteropelvic junction on the right without  significant proximal hydronephrosis. Bilateral nonobstructing stones.  2. No masses or suspicious filling defects within the opacified  urinary collecting system.  3. Large hiatal hernia containing the stomach and portions of the  transverse colon, pancreas, and splenic vasculature.  4. Hepatic steatosis and splenomegaly.  5. Previously seen presumed hemorrhage in the right retroperitoneum  and pelvis have nearly resolved.    Echocardiogram 10/18/21  The left ventricle is normal in size.  Left ventricular systolic function is normal.  The visual ejection fraction is 55-60%.  The right ventricle is normal size.  The right ventricular systolic function is normal.  Inferior vena cava not well visualized for estimation of right atrial  pressure.  The study  "[facility]  [facility]/ Internal Medicine  Clinic Note      Subjective:       Patient ID: Sofi Vidal is a 34 y.o. female.    Chief Complaint: Follow-up and Muscle Fatigue    Patient is here today for urgent care follow-up.  She was seen there last night for evaluation of severe fatigue, and referred to primary care for further evaluation.  She describes onset of body aches and fatigue dating back to 10/3, with significantly worsened myalgias and continued fatigue in the last week.  She describes feeling like a "weighted blanket" is draped over her.  Along the time course, she developed sinus congestion and was treated with a course of doxycycline and prednisone burst.  She notes complete resolution of her sinus and upper respiratory symptoms, and some improvement of her constitutional symptoms with the steroid dosing.  She does not have any significant sore throat, rhinorrhea, or congestion at this point.  She had labs drawn last night, with normal blood counts and normal/negative inflammatory markers.  She was not tested for Mono.  She describes episodes of extreme fatigue over the last 3 days, wherein she could not move off of the couch, and had focal severe myalgias and TTP.  She feels her muscles are less sensitive to palpation today and energy level has improved but nowhere near normal.  She denies any dyspnea, but with chest wall soreness along with the diffuse muscle pain, neck muscle tightness, and headache.  She has had intermittent night sweats and some chills, but no fevers.  No focal muscle weakness, and no paresthesias.    Review of Systems   Constitutional: Positive for chills and fatigue. Negative for fever.   HENT: Negative for congestion, rhinorrhea and sore throat.    Respiratory: Positive for shortness of breath (subjective, intermittent). Negative for cough.    Cardiovascular: Positive for chest pain (upper chest wall soreness). Negative for palpitations. " "  Gastrointestinal: Negative for abdominal pain, diarrhea, nausea and vomiting.   Musculoskeletal: Positive for myalgias (diffuse, sporadic, severe). Negative for arthralgias and joint swelling.   Skin: Negative for rash and wound.   Neurological: Positive for weakness (generalized, non-focal). Negative for dizziness, tremors, light-headedness and headaches.       Objective:      /60 (BP Location: Left arm, Patient Position: Sitting, BP Method: Medium (Manual))   Pulse 106   Temp 98 °F (36.7 °C) (Oral)   Ht 5' 4" (1.626 m)   Wt 62.9 kg (138 lb 12.5 oz)   SpO2 100%   BMI 23.82 kg/m²   Physical Exam   Constitutional: She is oriented to person, place, and time. She appears well-developed and well-nourished. No distress.   HENT:   Head: Normocephalic and atraumatic.   Right Ear: Tympanic membrane and ear canal normal. Tympanic membrane is not erythematous and not retracted. No middle ear effusion.   Left Ear: Tympanic membrane and ear canal normal. Tympanic membrane is not erythematous and not retracted.  No middle ear effusion.   Nose: Nose normal. No mucosal edema or rhinorrhea.   Mouth/Throat: Oropharynx is clear and moist and mucous membranes are normal. No posterior oropharyngeal edema or posterior oropharyngeal erythema.   Neck: Normal range of motion. No thyromegaly present.   Cardiovascular: Normal rate and regular rhythm.   No murmur heard.  Pulmonary/Chest: Effort normal and breath sounds normal. No respiratory distress. She has no wheezes. She has no rales.   Abdominal: Soft. Bowel sounds are normal. She exhibits no distension. There is no tenderness.   Musculoskeletal: Normal range of motion. She exhibits no edema.   Lymphadenopathy:     She has no cervical adenopathy.   Neurological: She is alert and oriented to person, place, and time. No cranial nerve deficit or sensory deficit. She exhibits normal muscle tone.   Skin: Skin is warm and dry. No rash noted.   Psychiatric: She has a normal mood " was technically limited.    Review of Systems   Constitutional: Positive for fever and unexpected weight change (increase). Negative for diaphoresis.   Respiratory: Positive for shortness of breath. Negative for cough.    Cardiovascular: Positive for chest pain.   Gastrointestinal: Positive for abdominal pain and nausea. Negative for vomiting.   Genitourinary: Positive for dysuria and hematuria.   Neurological: Positive for weakness.   All other systems reviewed and are negative.      Allergies:  Cefdinir  Nitrofurantoin  Sulfamethoxazole W/Trimethoprim  Ciprofloxacin  Kiwi  Metronidazole  Azithromycin    Medications:  Albuterol  Fosamax  Eliquis  Lioresal  Buspar  Voltaren  Vibra-Tabs  Epipen  Airduo Respicklic  Hydrodiuril  Duoneb  Imodium  Biofreeze  Medrol  mycophenolic acid  Narcan  Prilosec  Zofran  Percocet  Repatha  Zoloft    Past Medical History:    Abnormal stress echo  Anemia  Anxiety  arthrits  Asthma  BCC  Hypertension  Insomnia  Fracture of pubic ramus  Depression  Disseminated mycobacterium chelonei infection  Diverticula of intestine  Elevated C-reactive protein  GERD  Giant cell arteritis  Hepatitis B core antibody positive  Hiatal hernia  Irregular heart beat  Morbid obesity  MS  Nephrolithiasis  Overactive bladder  FERMIN  Optic neuritis  Osteoporosis  Overflow incontinence  Polymyositis  Pulmonary embolism  Rectal prolapse  Rectocele  Schatzki's ring  Thrombosis of leg  Thyroid disease  Uterine prolapse   Acute renal failure  Fecal incontinence  Aortic atherosclerosis    Past Surgical History:    Abdomen surgery  Bilateral oophorectomy  Biopsy muscle diagnostic  Colonoscopy  Sinus surgery  Excise bone cyst submaxillary  Extractions, dental   Left hip fracture tx  Esophagoscopy, diagnostic  Muscle biopsy  Stress echo  Upper GI endoscopy     Family History:    Mother: metastatic skin cancer, atrial fibrillation, hypertension, lipids, osteoporosis, thyroid disease, diabetes, hyperlipidemia,  "and affect.   Vitals reviewed.      Assessment:       1. Postviral syndrome    2. Fatigue, unspecified type    3. Iron deficiency    4. Myalgia        Plan:     1. Postviral syndrome  2. Fatigue, unspecified type  3. Iron deficiency  4. Myalgia  - history and exam findings reviewed, generally reassuring, without acute alarm signs or symptoms, but with prolonged fatigue and myalgias 2-3 weeks after recent illness  - differential reviewed, uncertain etiology beyond "post-viral syndrome," something like primary nerve/muscle condition would be less likely but have advised of warning signs for which to monitor  - - HETEROPHILE AB SCREEN  - will refer to Neurology for additional evaluation, possible EMG versus additional testing as indicated  - - Ambulatory referral to Neurology   - supportive care measures reviewed   - treatment options reviewed, will start iron supplement based on recent labs, dosing instructions and potential side effects reviewed  - - ferrous sulfate (FEOSOL) 325 mg (65 mg iron) Tab tablet; Take 1 tablet (325 mg total) by mouth 2 (two) times daily.  Dispense: 180 tablet; Refill: 1  - questions answered, warning signs reviewed, patient is comfortable with this plan and was encouraged to call the office for any concerns or worsening of condition     - Follow up in about 2 weeks (around 11/5/2019) for follow-up viral syndrome.     Rosendo Boss MD  10/22/2019          Medication List with Changes/Refills   New Medications    FERROUS SULFATE (FEOSOL) 325 MG (65 MG IRON) TAB TABLET    Take 1 tablet (325 mg total) by mouth 2 (two) times daily.   Current Medications    FLUTICASONE PROPIONATE (FLOVENT HFA) 44 MCG/ACTUATION INHALER    Inhale 2 puffs into the lungs 2 (two) times daily. Controller    NORGESTIMATE-ETHINYL ESTRADIOL (ORTHO TRI-CYCLEN, 28,) 0.18/0.215/0.25 MG-35 MCG (28) TABLET    Take 1 tablet by mouth once daily.   Discontinued Medications    BENZONATATE (TESSALON) 100 MG CAPSULE    Take 1 " CAD  Father: mini strokes, MI, hyperlipidemia, CAD, prostate cancer, depression, asthma  Sister: diabetes, Hashimoto's thyroid disease, obesity, hypertension, pulmonary embolism, obesity, heart disease, MS, depression   Brother: hypertension, pacemaker    Social History:  The patient presents to the ED with a significant other.     Physical Exam     Patient Vitals for the past 24 hrs:   BP Temp Temp src Pulse Resp SpO2   10/29/21 2100 105/65 -- -- -- -- --   10/29/21 1750 (!) 140/74 -- -- -- -- 100 %   10/29/21 1310 138/64 97.6  F (36.4  C) Oral 68 18 100 %       Physical Exam  General: Alert and cooperative with exam. Patient in mild distress. Normal mentation.  Anxious and tearful on initial arrival  Head:  Scalp is NC/AT  Eyes:  No scleral icterus, PERRL  ENT:  The external nose and ears are normal.   Neck:  Normal range of motion without rigidity.  CV:  Regular rate and rhythm    No pathologic murmur   Resp:  Breath sounds are clear bilaterally    Non-labored, no retractions or accessory muscle use  GI:  Abdomen is soft, no distension, no tenderness. No peritoneal signs.  Obese  MS:  +1 pitting edema to lower extremities with venous stasis changes  Skin:  Warm and dry, No rash or lesions noted.  Neuro: Oriented x 3. No gross motor deficits.        Emergency Department Course   ECG  ECG taken at 1318, ECG read at 1710  Sinus rhythm with occasional premature ventricular complexes. Otherwise normal ECG.    No significant change as compared to prior, dated 8/6/18.  Rate 67 bpm. RI interval 134 ms. QRS duration 88 ms. QT/QTc 412/435 ms. P-R-T axes 16 13 23.     Imaging:  XR chest PA & LAT  Cardiomegaly. Pulmonary vascularity normal. Elevated left hemidiaphragm with esophageal hiatal hernia. Subsegmental atelectasis in the bases. Upper lung fields are clear.  As per radiology.     Laboratory:  CBC: WBC 7.0, HGB 14.3, (L)     BMP: calcium: 8.4(L), glucose: 104(H) o/w WNL (Creatinine 0.53)     Troponin (Collected  1715): <0.015    UA with microscopic: blood urine: large, protein albumin: 10, bacteria: few, RBC: >182(H), squamous epithelials: 2(H) o/w WNL     BNP: 324    CK total: 553(H)    Emergency Department Course:    Reviewed:  I reviewed nursing notes, vitals, past medical history and care everywhere    Assessments:  1710 I obtained history and examined the patient as noted above.   2100 I rechecked the patient and explained findings.       Disposition:  The patient was discharged to home.     Impression & Plan     Medical Decision Making:  Patient is a 61-year-old female who presents with multiple complaints including intermittent fevers/chest pain/shortness of breath/right-sided abdominal pain/urinary symptoms.  Patient's medical history and records reviewed.  On evaluation patient was anxious and tearful.  Labs, EKG, and imaging was obtained.  Chest x-ray demonstrated hiatal hernia which was noted on CT obtained earlier today.  CT earlier today also demonstrated a 3 mm UPJ kidney stone.  UA shows hematuria without evidence of infection.  Hemoglobin normal.  EKG shows normal sinus rhythm without evidence of acute ischemia, infarction, or arrhythmia.  Patient had mild pitting edema in her lower extremities though BNP was normal and there is no significant clinical evidence of CHF exacerbation; likely element of venous stasis and dependent edema.  CK mildly elevated.  Troponin is negative.  HEART score = 3; patient's description of chest pain is very atypical for ACS/cardiac etiology; I feel she is safe and appropriate for continued outpatient evaluation.  On reevaluation patient is comfortable/nontoxic appearing in the room with normal vital signs.  At this time no emergent condition was identified.  She was provided short prescription for Percocet for breakthrough pain related to current kidney stone as well as Flomax and Zofran.  She will follow up closely with her PCP for reevaluation.  Return precautions were  capsule (100 mg total) by mouth 3 (three) times daily as needed.    DOXYCYCLINE (VIBRAMYCIN) 100 MG CAP    Take 1 capsule (100 mg total) by mouth 2 (two) times daily. for 10 days    ESCITALOPRAM OXALATE (LEXAPRO) 5 MG TAB    Take 1 tablet (5 mg total) by mouth once daily.    FLUTICASONE PROPIONATE (FLONASE) 50 MCG/ACTUATION NASAL SPRAY    2 sprays (100 mcg total) by Each Nostril route once daily.       discussed.  Patient was discharged home.    Covid-19  Sandhya Trujillo was evaluated during a global COVID-19 pandemic, which necessitated consideration that the patient might be at risk for infection with the SARS-CoV-2 virus that causes COVID-19.   Applicable protocols for evaluation were followed during the patient's care.     Diagnosis:    ICD-10-CM    1. Ureterolithiasis  N20.1    2. Chest pain, unspecified type  R07.9    3. Gross hematuria  R31.0        Discharge Medications:  Discharge Medication List as of 10/29/2021  9:22 PM      START taking these medications    Details   !! ondansetron (ZOFRAN ODT) 4 MG ODT tab Take 1 tablet (4 mg) by mouth every 6 hours as needed for nausea or vomiting, Disp-10 tablet, R-0, Local Print      !! oxyCODONE-acetaminophen (PERCOCET) 5-325 MG tablet Take 1-2 tablets by mouth every 6 hours as needed for breakthrough pain, Disp-8 tablet, R-0, Local Print      tamsulosin (FLOMAX) 0.4 MG capsule Take 1 capsule (0.4 mg) by mouth daily, Disp-10 capsule, R-0, Local Print          Scribe Disclosure:  I, Eber Oropeza, am serving as a scribe at 5:10 PM on 10/29/2021 to document services personally performed by Marcelino Beckford DO based on my observations and the provider's statements to me.          Marcelino Beckford DO  10/30/21 1024

## 2021-12-23 ENCOUNTER — OFFICE VISIT (OUTPATIENT)
Dept: DERMATOLOGY | Facility: CLINIC | Age: 36
End: 2021-12-23
Payer: COMMERCIAL

## 2021-12-23 DIAGNOSIS — Z12.83 SCREENING EXAM FOR SKIN CANCER: ICD-10-CM

## 2021-12-23 DIAGNOSIS — L81.4 LENTIGO: ICD-10-CM

## 2021-12-23 DIAGNOSIS — L70.0 ACNE NODULE: Primary | ICD-10-CM

## 2021-12-23 DIAGNOSIS — D22.9 MULTIPLE BENIGN NEVI: ICD-10-CM

## 2021-12-23 DIAGNOSIS — L82.1 SK (SEBORRHEIC KERATOSIS): ICD-10-CM

## 2021-12-23 PROCEDURE — 99999 PR PBB SHADOW E&M-EST. PATIENT-LVL II: CPT | Mod: PBBFAC,,, | Performed by: DERMATOLOGY

## 2021-12-23 PROCEDURE — 11900 PR INJECTION INTO SKIN LESIONS, UP TO 7: ICD-10-PCS | Mod: S$GLB,,, | Performed by: DERMATOLOGY

## 2021-12-23 PROCEDURE — 99213 OFFICE O/P EST LOW 20 MIN: CPT | Mod: 25,S$GLB,, | Performed by: DERMATOLOGY

## 2021-12-23 PROCEDURE — 1159F MED LIST DOCD IN RCRD: CPT | Mod: CPTII,S$GLB,, | Performed by: DERMATOLOGY

## 2021-12-23 PROCEDURE — 99999 PR PBB SHADOW E&M-EST. PATIENT-LVL II: ICD-10-PCS | Mod: PBBFAC,,, | Performed by: DERMATOLOGY

## 2021-12-23 PROCEDURE — 1159F PR MEDICATION LIST DOCUMENTED IN MEDICAL RECORD: ICD-10-PCS | Mod: CPTII,S$GLB,, | Performed by: DERMATOLOGY

## 2021-12-23 PROCEDURE — 99213 PR OFFICE/OUTPT VISIT, EST, LEVL III, 20-29 MIN: ICD-10-PCS | Mod: 25,S$GLB,, | Performed by: DERMATOLOGY

## 2021-12-23 PROCEDURE — 11900 INJECT SKIN LESIONS </W 7: CPT | Mod: S$GLB,,, | Performed by: DERMATOLOGY

## 2021-12-23 PROCEDURE — 1160F PR REVIEW ALL MEDS BY PRESCRIBER/CLIN PHARMACIST DOCUMENTED: ICD-10-PCS | Mod: CPTII,S$GLB,, | Performed by: DERMATOLOGY

## 2021-12-23 PROCEDURE — 1160F RVW MEDS BY RX/DR IN RCRD: CPT | Mod: CPTII,S$GLB,, | Performed by: DERMATOLOGY

## 2022-01-20 DIAGNOSIS — Z00.00 ROUTINE GENERAL MEDICAL EXAMINATION AT A HEALTH CARE FACILITY: Primary | ICD-10-CM

## 2022-01-25 ENCOUNTER — PATIENT MESSAGE (OUTPATIENT)
Dept: ADMINISTRATIVE | Facility: HOSPITAL | Age: 37
End: 2022-01-25
Payer: COMMERCIAL

## 2022-02-17 ENCOUNTER — HOSPITAL ENCOUNTER (OUTPATIENT)
Dept: RADIOLOGY | Facility: HOSPITAL | Age: 37
Discharge: HOME | End: 2022-02-17
Attending: INTERNAL MEDICINE
Payer: COMMERCIAL

## 2022-02-17 ENCOUNTER — HOSPITAL ENCOUNTER (OUTPATIENT)
Dept: CARDIOLOGY | Facility: CLINIC | Age: 37
Discharge: HOME | End: 2022-02-17
Payer: COMMERCIAL

## 2022-02-17 ENCOUNTER — IMMUNIZATION (OUTPATIENT)
Dept: INTERNAL MEDICINE | Facility: CLINIC | Age: 37
End: 2022-02-17
Payer: COMMERCIAL

## 2022-02-17 ENCOUNTER — CLINICAL SUPPORT (OUTPATIENT)
Dept: INTERNAL MEDICINE | Facility: CLINIC | Age: 37
End: 2022-02-17
Payer: COMMERCIAL

## 2022-02-17 ENCOUNTER — OFFICE VISIT (OUTPATIENT)
Dept: INTERNAL MEDICINE | Facility: CLINIC | Age: 37
End: 2022-02-17
Payer: COMMERCIAL

## 2022-02-17 VITALS — HEIGHT: 64 IN | WEIGHT: 145 LBS | BODY MASS INDEX: 24.75 KG/M2

## 2022-02-17 VITALS
BODY MASS INDEX: 24.84 KG/M2 | DIASTOLIC BLOOD PRESSURE: 65 MMHG | WEIGHT: 145.5 LBS | SYSTOLIC BLOOD PRESSURE: 110 MMHG | HEIGHT: 64 IN

## 2022-02-17 DIAGNOSIS — E61.1 IRON DEFICIENCY: ICD-10-CM

## 2022-02-17 DIAGNOSIS — Z00.00 ROUTINE GENERAL MEDICAL EXAMINATION AT A HEALTH CARE FACILITY: ICD-10-CM

## 2022-02-17 DIAGNOSIS — Z23 NEED FOR VACCINATION: Primary | ICD-10-CM

## 2022-02-17 DIAGNOSIS — N13.30 HYDRONEPHROSIS, UNSPECIFIED HYDRONEPHROSIS TYPE: ICD-10-CM

## 2022-02-17 DIAGNOSIS — E55.9 VITAMIN D INSUFFICIENCY: ICD-10-CM

## 2022-02-17 DIAGNOSIS — F32.A ANXIETY AND DEPRESSION: ICD-10-CM

## 2022-02-17 DIAGNOSIS — F41.9 ANXIETY AND DEPRESSION: ICD-10-CM

## 2022-02-17 DIAGNOSIS — M25.559 HIP PAIN: ICD-10-CM

## 2022-02-17 DIAGNOSIS — Z00.00 ROUTINE GENERAL MEDICAL EXAMINATION AT A HEALTH CARE FACILITY: Primary | ICD-10-CM

## 2022-02-17 DIAGNOSIS — Z00.00 ANNUAL PHYSICAL EXAM: Primary | ICD-10-CM

## 2022-02-17 PROBLEM — R53.83 FATIGUE: Status: RESOLVED | Noted: 2019-10-21 | Resolved: 2022-02-17

## 2022-02-17 LAB
ALBUMIN SERPL BCP-MCNC: 3.7 G/DL (ref 3.5–5.2)
ALP SERPL-CCNC: 57 U/L (ref 55–135)
ALT SERPL W/O P-5'-P-CCNC: 12 U/L (ref 10–44)
ANION GAP SERPL CALC-SCNC: 11 MMOL/L (ref 8–16)
AST SERPL-CCNC: 15 U/L (ref 10–40)
BILIRUB SERPL-MCNC: 0.8 MG/DL (ref 0.1–1)
BUN SERPL-MCNC: 9 MG/DL (ref 6–20)
CALCIUM SERPL-MCNC: 9.3 MG/DL (ref 8.7–10.5)
CHLORIDE SERPL-SCNC: 103 MMOL/L (ref 95–110)
CHOLEST SERPL-MCNC: 239 MG/DL (ref 120–199)
CHOLEST/HDLC SERPL: 3.2 {RATIO} (ref 2–5)
CO2 SERPL-SCNC: 27 MMOL/L (ref 23–29)
CREAT SERPL-MCNC: 0.7 MG/DL (ref 0.5–1.4)
ERYTHROCYTE [DISTWIDTH] IN BLOOD BY AUTOMATED COUNT: 12.2 % (ref 11.5–14.5)
EST. GFR  (AFRICAN AMERICAN): >60 ML/MIN/1.73 M^2
EST. GFR  (NON AFRICAN AMERICAN): >60 ML/MIN/1.73 M^2
ESTIMATED AVG GLUCOSE: 94 MG/DL (ref 68–131)
GLUCOSE SERPL-MCNC: 84 MG/DL (ref 70–110)
HBA1C MFR BLD: 4.9 % (ref 4–5.6)
HCT VFR BLD AUTO: 40.8 % (ref 37–48.5)
HDLC SERPL-MCNC: 75 MG/DL (ref 40–75)
HDLC SERPL: 31.4 % (ref 20–50)
HGB BLD-MCNC: 13 G/DL (ref 12–16)
LDLC SERPL CALC-MCNC: 135.4 MG/DL (ref 63–159)
MCH RBC QN AUTO: 30 PG (ref 27–31)
MCHC RBC AUTO-ENTMCNC: 31.9 G/DL (ref 32–36)
MCV RBC AUTO: 94 FL (ref 82–98)
NONHDLC SERPL-MCNC: 164 MG/DL
PLATELET # BLD AUTO: 224 K/UL (ref 150–450)
PMV BLD AUTO: 10.8 FL (ref 9.2–12.9)
POTASSIUM SERPL-SCNC: 4.1 MMOL/L (ref 3.5–5.1)
PROT SERPL-MCNC: 6.8 G/DL (ref 6–8.4)
RBC # BLD AUTO: 4.33 M/UL (ref 4–5.4)
SODIUM SERPL-SCNC: 141 MMOL/L (ref 136–145)
TRIGL SERPL-MCNC: 143 MG/DL (ref 30–150)
TSH SERPL DL<=0.005 MIU/L-ACNC: 1.76 UIU/ML (ref 0.4–4)
WBC # BLD AUTO: 7.52 K/UL (ref 3.9–12.7)

## 2022-02-17 PROCEDURE — 77063 BREAST TOMOSYNTHESIS BI: CPT | Mod: 26,,, | Performed by: RADIOLOGY

## 2022-02-17 PROCEDURE — 99395 PREV VISIT EST AGE 18-39: CPT | Mod: S$GLB,,, | Performed by: INTERNAL MEDICINE

## 2022-02-17 PROCEDURE — 1159F MED LIST DOCD IN RCRD: CPT | Mod: CPTII,S$GLB,, | Performed by: INTERNAL MEDICINE

## 2022-02-17 PROCEDURE — 99999 PR PBB SHADOW E&M-EST. PATIENT-LVL I: ICD-10-PCS | Mod: PBBFAC,,,

## 2022-02-17 PROCEDURE — 77063 BREAST TOMOSYNTHESIS BI: CPT | Mod: TC

## 2022-02-17 PROCEDURE — 91305 COVID-19, MRNA, LNP-S, PF, 30 MCG/0.3 ML DOSE VACCINE (PFIZER): ICD-10-PCS | Mod: S$GLB,,, | Performed by: INTERNAL MEDICINE

## 2022-02-17 PROCEDURE — 3078F DIAST BP <80 MM HG: CPT | Mod: CPTII,S$GLB,, | Performed by: INTERNAL MEDICINE

## 2022-02-17 PROCEDURE — 85027 COMPLETE CBC AUTOMATED: CPT | Performed by: INTERNAL MEDICINE

## 2022-02-17 PROCEDURE — 84443 ASSAY THYROID STIM HORMONE: CPT | Performed by: INTERNAL MEDICINE

## 2022-02-17 PROCEDURE — 76770 US EXAM ABDO BACK WALL COMP: CPT | Mod: 26,,, | Performed by: RADIOLOGY

## 2022-02-17 PROCEDURE — 3074F SYST BP LT 130 MM HG: CPT | Mod: CPTII,S$GLB,, | Performed by: INTERNAL MEDICINE

## 2022-02-17 PROCEDURE — 3074F PR MOST RECENT SYSTOLIC BLOOD PRESSURE < 130 MM HG: ICD-10-PCS | Mod: CPTII,S$GLB,, | Performed by: INTERNAL MEDICINE

## 2022-02-17 PROCEDURE — 3078F PR MOST RECENT DIASTOLIC BLOOD PRESSURE < 80 MM HG: ICD-10-PCS | Mod: CPTII,S$GLB,, | Performed by: INTERNAL MEDICINE

## 2022-02-17 PROCEDURE — 1159F PR MEDICATION LIST DOCUMENTED IN MEDICAL RECORD: ICD-10-PCS | Mod: CPTII,S$GLB,, | Performed by: INTERNAL MEDICINE

## 2022-02-17 PROCEDURE — 93005 EKG 12-LEAD: ICD-10-PCS | Mod: S$GLB,,, | Performed by: INTERNAL MEDICINE

## 2022-02-17 PROCEDURE — 99395 PR PREVENTIVE VISIT,EST,18-39: ICD-10-PCS | Mod: S$GLB,,, | Performed by: INTERNAL MEDICINE

## 2022-02-17 PROCEDURE — 77067 SCR MAMMO BI INCL CAD: CPT | Mod: 26,,, | Performed by: RADIOLOGY

## 2022-02-17 PROCEDURE — 80061 LIPID PANEL: CPT | Performed by: INTERNAL MEDICINE

## 2022-02-17 PROCEDURE — 86803 HEPATITIS C AB TEST: CPT | Performed by: INTERNAL MEDICINE

## 2022-02-17 PROCEDURE — 77063 MAMMO DIGITAL SCREENING BILAT WITH TOMO: ICD-10-PCS | Mod: 26,,, | Performed by: RADIOLOGY

## 2022-02-17 PROCEDURE — 99999 PR PBB SHADOW E&M-EST. PATIENT-LVL V: CPT | Mod: PBBFAC,,, | Performed by: INTERNAL MEDICINE

## 2022-02-17 PROCEDURE — 99999 PR PBB SHADOW E&M-EST. PATIENT-LVL V: ICD-10-PCS | Mod: PBBFAC,,, | Performed by: INTERNAL MEDICINE

## 2022-02-17 PROCEDURE — 93010 ELECTROCARDIOGRAM REPORT: CPT | Mod: S$GLB,,, | Performed by: INTERNAL MEDICINE

## 2022-02-17 PROCEDURE — 3008F PR BODY MASS INDEX (BMI) DOCUMENTED: ICD-10-PCS | Mod: CPTII,S$GLB,, | Performed by: INTERNAL MEDICINE

## 2022-02-17 PROCEDURE — 76770 US RETROPERITONEAL COMPLETE: ICD-10-PCS | Mod: 26,,, | Performed by: RADIOLOGY

## 2022-02-17 PROCEDURE — 93010 EKG 12-LEAD: ICD-10-PCS | Mod: S$GLB,,, | Performed by: INTERNAL MEDICINE

## 2022-02-17 PROCEDURE — 83036 HEMOGLOBIN GLYCOSYLATED A1C: CPT | Performed by: INTERNAL MEDICINE

## 2022-02-17 PROCEDURE — 76770 US EXAM ABDO BACK WALL COMP: CPT | Mod: TC

## 2022-02-17 PROCEDURE — 80053 COMPREHEN METABOLIC PANEL: CPT | Performed by: INTERNAL MEDICINE

## 2022-02-17 PROCEDURE — 91305 COVID-19, MRNA, LNP-S, PF, 30 MCG/0.3 ML DOSE VACCINE (PFIZER): CPT | Mod: S$GLB,,, | Performed by: INTERNAL MEDICINE

## 2022-02-17 PROCEDURE — 93005 ELECTROCARDIOGRAM TRACING: CPT | Mod: S$GLB,,, | Performed by: INTERNAL MEDICINE

## 2022-02-17 PROCEDURE — 77067 MAMMO DIGITAL SCREENING BILAT WITH TOMO: ICD-10-PCS | Mod: 26,,, | Performed by: RADIOLOGY

## 2022-02-17 PROCEDURE — 3008F BODY MASS INDEX DOCD: CPT | Mod: CPTII,S$GLB,, | Performed by: INTERNAL MEDICINE

## 2022-02-17 PROCEDURE — 99999 PR PBB SHADOW E&M-EST. PATIENT-LVL I: CPT | Mod: PBBFAC,,,

## 2022-02-17 RX ORDER — FERROUS SULFATE 325(65) MG
325 TABLET ORAL
Qty: 36 TABLET | Refills: 3 | Status: SHIPPED | OUTPATIENT
Start: 2022-02-18 | End: 2023-10-24

## 2022-02-17 RX ORDER — VIT C/E/ZN/COPPR/LUTEIN/ZEAXAN 250MG-90MG
2000 CAPSULE ORAL DAILY
Qty: 180 CAPSULE | Refills: 3 | Status: SHIPPED | OUTPATIENT
Start: 2022-02-17 | End: 2023-10-24

## 2022-02-17 RX ORDER — LORAZEPAM 0.5 MG/1
0.5 TABLET ORAL DAILY PRN
Qty: 30 TABLET | Refills: 0 | Status: SHIPPED | OUTPATIENT
Start: 2022-02-17 | End: 2022-03-19

## 2022-02-17 RX ORDER — ESCITALOPRAM OXALATE 10 MG/1
10 TABLET ORAL DAILY
Qty: 30 TABLET | Refills: 4 | Status: SHIPPED | OUTPATIENT
Start: 2022-02-17 | End: 2022-09-13

## 2022-02-17 NOTE — LETTER
February 17, 2022    Sofi Vidal  [address] [place] [id number]             [facility]  [address]  [facility] [address]  Phone: [phone]  Fax: [phone] Dear Mrs. Vidal:    Thank you for allowing me to serve you and perform your Executive Health exam on 2/17/2022.  This letter will serve a brief summary of the history, physical findings, and laboratory/studies performed and recommendations at that time.    Reason for Visit: Executive Health Preventive Physical Examination    Past Medical History:   Diagnosis Date    Abnormal cervical Papanicolaou smear 2011, 11-19-12    Colposcopy , HONEY 1    Anxiety     Childhood asthma     Cholelithiasis complicating pregnancy, antepartum        Past Surgical History:   Procedure Laterality Date    COLPOSCOPY      LASIK         Family History   Problem Relation Age of Onset    Breast cancer Maternal Grandmother 68    Lung cancer Maternal Grandmother     Cancer Maternal Grandmother         breast, lung    Cancer Father         prostate    Anxiety disorder Mother     Parkinsonism Paternal Grandmother     Diabetes Maternal Aunt     Heart disease Sister     Allergy (severe) Son     Colon cancer Neg Hx     Ovarian cancer Neg Hx        Social History     Socioeconomic History    Marital status:     Number of children: 1   Tobacco Use    Smoking status: Never Smoker    Smokeless tobacco: Never Used   Substance and Sexual Activity    Alcohol use: Yes     Comment: rare    Drug use: No    Sexual activity: Not Currently     Partners: Male     Birth control/protection: OCP   Other Topics Concern    Are you pregnant or think you may be? No    Breast-feeding No   Social History Narrative    3-4 x weekly    Cross Fit        Review of patient's allergies indicates:  No Known Allergies      Current Outpatient Medications:     TRI-SPRINTEC, 28, 0.18/0.215/0.25 mg-35 mcg (28) tablet, TAKE 1 TABLET BY MOUTH ONCE  DAILY, Disp: 28 tablet, Rfl: 12    cholecalciferol, vitamin D3, (VITAMIN D3) 25 mcg (1,000 unit) capsule, Take 2 capsules (2,000 Units total) by mouth once daily., Disp: 180 capsule, Rfl: 3    EScitalopram oxalate (LEXAPRO) 10 MG tablet, Take 1 tablet (10 mg total) by mouth once daily., Disp: 30 tablet, Rfl: 4    [START ON 2/18/2022] ferrous sulfate (FEOSOL) 325 mg (65 mg iron) Tab tablet, Take 1 tablet (325 mg total) by mouth 3 (three) times a week., Disp: 36 tablet, Rfl: 3    LORazepam (ATIVAN) 0.5 MG tablet, Take 1 tablet (0.5 mg total) by mouth daily as needed for Anxiety., Disp: 30 tablet, Rfl: 0       Review of Systems  Review of Systems - Negative except for some situational stress and anxiety, as well as some right hip discomfort.    Physical Exam:  General: General appearance: alert, well appearing, and in no distress.   Skin: Skin exam - normal coloration and turgor, no rashes, no suspicious skin lesions noted.  HEENT: Ears - bilateral TM's and external ear canals normal. , ENT exam reveals - ENT exam normal, no neck nodes or sinus tenderness.   Lungs: Chest: clear to auscultation, no wheezes, rales or rhonchi, symmetric air entry.   Heart: CVS exam: normal rate, regular rhythm, normal S1, S2, no murmurs, rubs, clicks or gallops.   Extremities: Exam of extremities: peripheral pulses normal, no pedal edema, no clubbing or cyanosis    Labs:  Results for orders placed or performed in visit on 02/17/22   Comprehensive metabolic panel   Result Value Ref Range    Sodium 141 136 - 145 mmol/L    Potassium 4.1 3.5 - 5.1 mmol/L    Chloride 103 95 - 110 mmol/L    CO2 27 23 - 29 mmol/L    Glucose 84 70 - 110 mg/dL    BUN 9 6 - 20 mg/dL    Creatinine 0.7 0.5 - 1.4 mg/dL    Calcium 9.3 8.7 - 10.5 mg/dL    Total Protein 6.8 6.0 - 8.4 g/dL    Albumin 3.7 3.5 - 5.2 g/dL    Total Bilirubin 0.8 0.1 - 1.0 mg/dL    Alkaline Phosphatase 57 55 - 135 U/L    AST 15 10 - 40 U/L    ALT 12 10 - 44 U/L    Anion Gap 11 8 - 16  mmol/L    eGFR if African American >60.0 >60 mL/min/1.73 m^2    eGFR if non African American >60.0 >60 mL/min/1.73 m^2   CBC Without Differential   Result Value Ref Range    WBC 7.52 3.90 - 12.70 K/uL    RBC 4.33 4.00 - 5.40 M/uL    Hemoglobin 13.0 12.0 - 16.0 g/dL    Hematocrit 40.8 37.0 - 48.5 %    MCV 94 82 - 98 fL    MCH 30.0 27.0 - 31.0 pg    MCHC 31.9 (L) 32.0 - 36.0 g/dL    RDW 12.2 11.5 - 14.5 %    Platelets 224 150 - 450 K/uL    MPV 10.8 9.2 - 12.9 fL   Lipid panel   Result Value Ref Range    Cholesterol 239 (H) 120 - 199 mg/dL    Triglycerides 143 30 - 150 mg/dL    HDL 75 40 - 75 mg/dL    LDL Cholesterol 135.4 63.0 - 159.0 mg/dL    HDL/Cholesterol Ratio 31.4 20.0 - 50.0 %    Total Cholesterol/HDL Ratio 3.2 2.0 - 5.0    Non-HDL Cholesterol 164 mg/dL   TSH   Result Value Ref Range    TSH 1.757 0.400 - 4.000 uIU/mL      EKG was acceptable.    Assessment/Recommendations:  Routine Health Maintenance; is up-to-date.  You do not get flu shots given a severe reaction a couple of years ago.  I do recommend a COVID booster.    Please work on a plant based eating plan and regular exercise.  We can recheck your cholesterol within the next year.    I am recommending an ultrasound to evaluate your kidneys as your found to have some hydronephrosis when you are pregnant.  You will also have physical therapy for your hip.    In terms of your mood, we are going to have you start on Lexapro daily; you may use Ativan sparingly for episodic anxiety.  As we discussed, the Lexapro does not work immediately but within 2-3 weeks you should feel less anxious.  Please continue to work on exercise and keeping caffeine to a minimum.  I have also placed a referral for our Behavioral Health integration stress reduction program.  We will follow up in 1 month by video visit.    At this time, you appear to be in good medical condition.  I look forward to seeing you again next month.  Please contact me should you have any questions or  concerns regarding physical findings, or my recommendations.        Sincerely,            Angelica Marin MD, FACP

## 2022-02-17 NOTE — PATIENT INSTRUCTIONS
Patient Education       Yearly Physical for Adults   About this topic   Most people do not want to be sick. Having a checkup each year with your doctor is one way to help you stay healthy. You may need to see your doctor more or less often. How often you need to go to the doctor depends on your age. Your family and medical history also play a role in how often you need to go to the doctor. Going to see your doctor on a routine basis can help you find problems early or even before they start. This may make it easier to treat or cure your problem.  General   Your doctor will talk about many things during your checkup. Your doctor may ask about:  · Your medical and family history.  · All the drugs you are taking. Be sure to include all prescription, over the counter, and herbal supplements. Tell the doctor if you have any drug allergy. Bring a list of drugs you take with you.  · How you are feeling and if you are having any problems.  · Risky behaviors like smoking, drinking alcohol, using illegal drugs, not wearing seatbelts, having unprotected sex, etc.  Your doctor will do a physical exam and may check your:  · Height and weight  · Blood pressure  · Reflexes  · Memory  · Vision  · Hearing  Your doctor may order:  · Lab tests  · ECG to check your heart rhythm  · X-rays  · Tests or treatments based on your exam  What lifestyle changes are needed?   Your doctor may suggest you make changes to your lifestyle at this visit. The doctor may talk with you about being more active or lowering stress levels. Ask your doctor what you need to do.  What drugs may be needed?   Your doctor may order drugs or vaccines to protect you from illnesses.  What changes to diet are needed?   Talk to your doctor to see if any changes are needed to your diet.  When do I need to call the doctor?   Call your doctor if you need to learn about any test results. Together you can make a plan for more care.  Helpful tips   · Make a list of questions  for your doctor before you go. This will help you remember to ask about any concerns. Write down any answers from your doctor so you can look over them after your visit.   · Tell your doctor about any changes in your body or health since your last visit.  · Ask your doctor about any screening tests you need.  Where can I learn more?   American Academy [employer]  http://familydoctor.org/familydoctor/en/prevention-wellness/staying-healthy/healthy-living/preventive-services-for-healthy-living.printerview.html   Centers for Disease Control  http://www.cdc.gov/family/checkup/   Last Reviewed Date   2019-04-22  Consumer Information Use and Disclaimer   This information is not specific medical advice and does not replace information you receive from your health care provider. This is only a brief summary of general information. It does NOT include all information about conditions, illnesses, injuries, tests, procedures, treatments, therapies, discharge instructions or life-style choices that may apply to you. You must talk with your health care provider for complete information about your health and treatment options. This information should not be used to decide whether or not to accept your health care providers advice, instructions or recommendations. Only your health care provider has the knowledge and training to provide advice that is right for you.  Copyright   Copyright © 2021 UpTo[employer], Inc. and its affiliates and/or licensors. All rights reserved.  Patient Education       Escitalopram (es sye RATNA oh pram)   Brand Names:  Lexapro   Brand Names: Thang ACH-Escitalopram; ACT Escitalopram ODT; ACT Escitalopram [DSC]; AG-Escitalopram; APO-Escitalopram; Auro-Escitalopram; BIO-Escitalopram; Cipralex; Cipralex Meltz [DSC]; JAMP-Escitalopram; WALT-Escitalopram; M-Escitalopram; Mar-Escitalopram; MINT-Escitalopram; MYLAN-Escitalopram; SANTIAGO-Escitalopram; NRA-Escitalopram; PMS-Escitalopram; PMSC-Escitalopram;  RAN-Escitalopram; USMAN-Escitalopram; SANDOZ Escitalopram; TEVA-Escitalopram   Warning   For all patients taking this drug:   · Drugs like this one have raised the chance of suicidal thoughts or actions in children and young adults. The risk may be greater in people who have had these thoughts or actions in the past. All people who take this drug need to be watched closely. Call the doctor right away if signs like low mood (depression), nervousness, restlessness, grouchiness, panic attacks, or changes in mood or actions are new or worse. Call the doctor right away if any thoughts or actions of suicide occur.  Children:   · This drug is not approved for use in all children. Talk with the doctor to be sure that this drug is right for your child.  What is this drug used for?   · It is used to treat low mood (depression).  · It is used to treat anxiety.  · It may be given to you for other reasons. Talk with the doctor.    What do I need to tell my doctor BEFORE I take this drug?   · If you are allergic to this drug; any part of this drug; or any other drugs, foods, or substances. Tell your doctor about the allergy and what signs you had.  · If you are taking any of these drugs: Linezolid or methylene blue.  · If you are taking any of these drugs: Citalopram or pimozide.  · If you have taken certain drugs for depression or Parkinson's disease in the last 14 days. This includes isocarboxazid, phenelzine, tranylcypromine, selegiline, or rasagiline. Very high blood pressure may happen.  This is not a list of all drugs or health problems that interact with this drug.  Tell your doctor and pharmacist about all of your drugs (prescription or OTC, natural products, vitamins) and health problems. You must check to make sure that it is safe for you to take this drug with all of your drugs and health problems. Do not start, stop, or change the dose of any drug without checking with your doctor.  What are some things I need to know  or do while I take this drug?   · Tell all of your health care providers that you take this drug. This includes your doctors, nurses, pharmacists, and dentists.  · Avoid driving and doing other tasks or actions that call for you to be alert until you see how this drug affects you.  · Do not stop taking this drug all of a sudden without calling your doctor. You may have a greater risk of side effects. If you need to stop this drug, you will want to slowly stop it as ordered by your doctor.  · Avoid drinking alcohol while taking this drug.  · Talk with your doctor before you use marijuana, other forms of cannabis, or prescription or OTC drugs that may slow your actions.  · In depression, sleep and appetite may get better soon after starting this drug. Other low mood signs may take up to 4 weeks to get better.  · This drug may raise the chance of bleeding. Sometimes, bleeding can be life-threatening. Talk with the doctor.  · This drug can cause low sodium levels. Very low sodium levels can be life-threatening, leading to seizures, passing out, trouble breathing, or death.  · If you are 65 or older, use this drug with care. You could have more side effects.  · This drug may affect growth in children and teens in some cases. They may need regular growth checks. Talk with the doctor.  · Tell your doctor if you are pregnant, plan on getting pregnant, or are breast-feeding. You will need to talk about the benefits and risks to you and the baby.  · Taking this drug in the third trimester of pregnancy may lead to some health problems in the . Talk with the doctor.    What are some side effects that I need to call my doctor about right away?   WARNING/CAUTION: Even though it may be rare, some people may have very bad and sometimes deadly side effects when taking a drug. Tell your doctor or get medical help right away if you have any of the following signs or symptoms that may be related to a very bad side  effect:  · Signs of an allergic reaction, like rash; hives; itching; red, swollen, blistered, or peeling skin with or without fever; wheezing; tightness in the chest or throat; trouble breathing, swallowing, or talking; unusual hoarseness; or swelling of the mouth, face, lips, tongue, or throat.  · Signs of low sodium levels like headache, trouble focusing, memory problems, feeling confused, weakness, seizures, or change in balance.  · Signs of bleeding like throwing up or coughing up blood; vomit that looks like coffee grounds; blood in the urine; black, red, or tarry stools; bleeding from the gums; abnormal vaginal bleeding; bruises without a cause or that get bigger; or bleeding you cannot stop.  · Seizures.  · Fever or chills.  · Painful erection (hard penis) or an erection that lasts for longer than 4 hours.  · Sex problems have happened with drugs like this one. This includes lowered interest in sex, trouble having an orgasm, ejaculation problems, or trouble getting or keeping an erection. If you have any sex problems or if you have any questions, talk with your doctor.  · Some people may have a higher chance of eye problems with this drug. Your doctor may want you to have an eye exam to see if you have a higher chance of these eye problems. Call your doctor right away if you have eye pain, change in eyesight, or swelling or redness in or around the eye.  · A severe and sometimes deadly problem called serotonin syndrome may happen. The risk may be greater if you also take certain other drugs. Call your doctor right away if you have agitation; change in balance; confusion; hallucinations; fever; fast or abnormal heartbeat; flushing; muscle twitching or stiffness; seizures; shivering or shaking; sweating a lot; severe diarrhea, upset stomach, or throwing up; or very bad headache.  What are some other side effects of this drug?   All drugs may cause side effects. However, many people have no side effects or only  have minor side effects. Call your doctor or get medical help if any of these side effects or any other side effects bother you or do not go away:  · Feeling dizzy, sleepy, tired, or weak.  · Upset stomach.  · Diarrhea or constipation.  · Dry mouth.  · Trouble sleeping.  · Sweating a lot.  · Flu-like signs.  · Runny nose.  · Headache.  · Yawning.  These are not all of the side effects that may occur. If you have questions about side effects, call your doctor. Call your doctor for medical advice about side effects.  You may report side effects to your national health agency.  You may report side effects to the FDA at 1-722.402.3773. You may also report side effects at https://www.fda.gov/medwatch.  How is this drug best taken?   Use this drug as ordered by your doctor. Read all information given to you. Follow all instructions closely.  All products:   · Take with or without food.  · Keep taking this drug as you have been told by your doctor or other health care provider, even if you feel well.  Oral solution:   · Measure liquid doses carefully. Use the measuring device that comes with this drug. If there is none, ask the pharmacist for a device to measure this drug.  What do I do if I miss a dose?   · Take a missed dose as soon as you think about it.  · If it is close to the time for your next dose, skip the missed dose and go back to your normal time.  · Do not take 2 doses at the same time or extra doses.    How do I store and/or throw out this drug?   · Store at room temperature in a dry place. Do not store in a bathroom.  · Keep all drugs in a safe place. Keep all drugs out of the reach of children and pets.  · Throw away unused or  drugs. Do not flush down a toilet or pour down a drain unless you are told to do so. Check with your pharmacist if you have questions about the best way to throw out drugs. There may be drug take-back programs in your area.    General drug facts   · If your symptoms or health  problems do not get better or if they become worse, call your doctor.  · Do not share your drugs with others and do not take anyone else's drugs.  · Some drugs may have another patient information leaflet. If you have any questions about this drug, please talk with your doctor, nurse, pharmacist, or other health care provider.  · This drug comes with an extra patient fact sheet called a Medication Guide. Read it with care. Read it again each time this drug is refilled. If you have any questions about this drug, please talk with the doctor, pharmacist, or other health care provider.  · If you think there has been an overdose, call your poison control center or get medical care right away. Be ready to tell or show what was taken, how much, and when it happened.    Consumer Information Use and Disclaimer   This generalized information is a limited summary of diagnosis, treatment, and/or medication information. It is not meant to be comprehensive and should be used as a tool to help the user understand and/or assess potential diagnostic and treatment options. It does NOT include all information about conditions, treatments, medications, side effects, or risks that may apply to a specific patient. It is not intended to be medical advice or a substitute for the medical advice, diagnosis, or treatment of a health care provider based on the health care provider's examination and assessment of a patient's specific and unique circumstances. Patients must speak with a health care provider for complete information about their health, medical questions, and treatment options, including any risks or benefits regarding use of medications. This information does not endorse any treatments or medications as safe, effective, or approved for treating a specific patient. UpToDate, Inc. and its affiliates disclaim any warranty or liability relating to this information or the use thereof. The use of this information is governed by the  Terms of Use, available at https://www.[employer].com/en/solutions/lexicomp/about/arianna.  Last Reviewed Date   2021-07-22  Copyright   © 2021 UpToDate, Inc. and its affiliates and/or licensors. All rights reserved.

## 2022-02-17 NOTE — PROGRESS NOTES
HPI:    Patient ID: Walter Ken is a 75year old female. Rhinorrhea  This is a new problem. Episode onset: 1 week ago. The problem occurs constantly. The problem has been unchanged. Associated symptoms include congestion.  Pertinent negatives include no Subjective:       Patient ID: Sofi Vidal is a 36 y.o. female.    Chief Complaint: [employer]    EH PE    Anxiety    Had this in the past and was prescribed meds but didn't take.  Lots of thoughts racing, heart fluttering as well.  Some trouble focusing.  No obvious situational stress recently, although is a single mom; her   of cancer 5 years ago.  Her son is 7 now.  She has no SI or HI.    Has had this for years and is worse in the past couple of months.  Trouble falling asleep.  Once she falls asleep stays asleep.  6-7 hours.  Wakes up OK but very tired in the afternoon.  Occasionally takes naps.    Found to have gallstones when she was pregnant, no further issues with this.  May have also had hydronephrosis.  No current urinary symptoms.    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  For this reason, she does not get flu vaccines.  She will consider a COVID booster.    Does exercise, going to cross fit.  May have strained her right hip or groin; would like to have physical therapy for this.    Patient Active Problem List:     Cholelithiasis: seen on u/s 2014     Anxiety and depression     Acne     Iron deficiency     Vitamin D insufficiency      Review of Systems   Constitutional: Negative for activity change, appetite change, chills, fatigue and fever.   HENT: Negative for congestion, hearing loss, sinus pressure and sore throat.    Eyes: Negative for visual disturbance.   Respiratory: Negative for apnea, cough, shortness of breath and wheezing.    Cardiovascular: Negative for chest pain, palpitations and leg swelling.   Gastrointestinal: Negative for abdominal distention, abdominal pain, constipation, diarrhea, nausea and vomiting.   Genitourinary: Negative for dysuria, frequency, hematuria and vaginal bleeding.   Musculoskeletal: Positive for arthralgias. Negative for gait problem, joint swelling and myalgias.        Hip pain R side-  by mouth daily with breakfast. Disp: 90 tablet Rfl: 0   ferrous sulfate 325 (65 FE) MG Oral Tab EC Take 1 tablet (325 mg total) by mouth 2 (two) times daily with meals.  Disp: 180 tablet Rfl: 0   simvastatin 20 MG Oral Tab Take 1 tablet (20 mg total) by ruben wants to try some PT   Skin: Negative for rash.   Neurological: Negative for dizziness, weakness, light-headedness and headaches.   Hematological: Negative for adenopathy. Does not bruise/bleed easily.   Psychiatric/Behavioral: Negative for confusion, hallucinations, self-injury, sleep disturbance and suicidal ideas.        See above, anxiety       Objective:      Physical Exam  Vitals and nursing note reviewed.   Constitutional:       Appearance: She is well-developed.   HENT:      Head: Normocephalic and atraumatic.      Right Ear: External ear normal.      Left Ear: External ear normal.      Nose: Nose normal.      Mouth/Throat:      Pharynx: No oropharyngeal exudate.   Eyes:      General: No scleral icterus.     Extraocular Movements: Extraocular movements intact.      Conjunctiva/sclera: Conjunctivae normal.   Neck:      Thyroid: No thyromegaly.      Vascular: No JVD.   Cardiovascular:      Rate and Rhythm: Normal rate and regular rhythm.      Heart sounds: Normal heart sounds. No murmur heard.  No gallop.    Pulmonary:      Effort: Pulmonary effort is normal. No respiratory distress.      Breath sounds: Normal breath sounds. No wheezing.   Abdominal:      General: Bowel sounds are normal. There is no distension.      Palpations: Abdomen is soft. There is no mass.      Tenderness: There is no abdominal tenderness. There is no guarding or rebound.   Musculoskeletal:         General: No tenderness. Normal range of motion.      Cervical back: Normal range of motion and neck supple.   Lymphadenopathy:      Cervical: No cervical adenopathy.   Skin:     General: Skin is warm.      Findings: No erythema or rash.   Neurological:      General: No focal deficit present.      Mental Status: She is alert and oriented to person, place, and time.      Cranial Nerves: No cranial nerve deficit.      Coordination: Coordination normal.   Psychiatric:         Behavior: Behavior normal.         Thought Content: Thought content  encounter. Meds This Visit:  Signed Prescriptions Disp Refills    Azelastine HCl 0.15 % Nasal Solution 30 mL 0      Si sprays by Nasal route 2 (two) times daily.       hydrocodone-ibuprofen 7.5-200 MG Oral Tab 90 tablet 0      Sig: Take 1 tablet b normal.         Judgment: Judgment normal.         Assessment:       1. Annual physical exam    2. Iron deficiency    3. Hip pain    4. Vitamin D insufficiency    5. Anxiety and depression    6. Hydronephrosis, unspecified hydronephrosis type        Plan:           Sofi was seen today for [facility].    Diagnoses and all orders for this visit:    Annual physical exam    Iron deficiency  -     ferrous sulfate (FEOSOL) 325 mg (65 mg iron) Tab tablet; Take 1 tablet (325 mg total) by mouth 3 (three) times a week.    Hip pain  -     Ambulatory referral/consult to Physical/Occupational Therapy; Future    Vitamin D insufficiency    Anxiety and depression  -     Ambulatory referral/consult to Primary Care Behavioral Health (Non-Opioids); Future    Hydronephrosis, unspecified hydronephrosis type  -     US Retroperitoneal Complete; Future    Other orders  -     EScitalopram oxalate (LEXAPRO) 10 MG tablet; Take 1 tablet (10 mg total) by mouth once daily.  -     cholecalciferol, vitamin D3, (VITAMIN D3) 25 mcg (1,000 unit) capsule; Take 2 capsules (2,000 Units total) by mouth once daily.  -     LORazepam (ATIVAN) 0.5 MG tablet; Take 1 tablet (0.5 mg total) by mouth daily as needed for Anxiety.    EKG acceptable  Labs acceptable; cholesterol reviewed  Continue with exercise and healthy habits  Behavior health integration  Follow-up with me in 1 month, sooner with problems in the interim  She has asked me to be her PCP

## 2022-02-18 LAB — HCV AB SERPL QL IA: NEGATIVE

## 2022-02-20 ENCOUNTER — PATIENT MESSAGE (OUTPATIENT)
Dept: INTERNAL MEDICINE | Facility: CLINIC | Age: 37
End: 2022-02-20
Payer: COMMERCIAL

## 2022-02-22 ENCOUNTER — PATIENT MESSAGE (OUTPATIENT)
Dept: BEHAVIORAL HEALTH | Facility: CLINIC | Age: 37
End: 2022-02-22
Payer: COMMERCIAL

## 2022-02-22 ENCOUNTER — TELEPHONE (OUTPATIENT)
Dept: BEHAVIORAL HEALTH | Facility: CLINIC | Age: 37
End: 2022-02-22
Payer: COMMERCIAL

## 2022-02-23 ENCOUNTER — HOSPITAL ENCOUNTER (OUTPATIENT)
Dept: RADIOLOGY | Facility: HOSPITAL | Age: 37
Discharge: HOME | End: 2022-02-23
Attending: INTERNAL MEDICINE
Payer: COMMERCIAL

## 2022-02-23 DIAGNOSIS — R92.8 ABNORMAL FINDING ON BREAST IMAGING: ICD-10-CM

## 2022-02-23 PROCEDURE — 76642 ULTRASOUND BREAST LIMITED: CPT | Mod: TC,LT

## 2022-02-23 PROCEDURE — 76642 US BREAST LEFT LIMITED: ICD-10-PCS | Mod: 26,LT,, | Performed by: RADIOLOGY

## 2022-02-23 PROCEDURE — 77065 DX MAMMO INCL CAD UNI: CPT | Mod: 26,LT,, | Performed by: RADIOLOGY

## 2022-02-23 PROCEDURE — 77065 DX MAMMO INCL CAD UNI: CPT | Mod: TC,LT

## 2022-02-23 PROCEDURE — 77065 MAMMO DIGITAL DIAGNOSTIC LEFT WITH TOMO: ICD-10-PCS | Mod: 26,LT,, | Performed by: RADIOLOGY

## 2022-02-23 PROCEDURE — 77061 BREAST TOMOSYNTHESIS UNI: CPT | Mod: 26,LT,, | Performed by: RADIOLOGY

## 2022-02-23 PROCEDURE — 77061 MAMMO DIGITAL DIAGNOSTIC LEFT WITH TOMO: ICD-10-PCS | Mod: 26,LT,, | Performed by: RADIOLOGY

## 2022-02-23 PROCEDURE — 76642 ULTRASOUND BREAST LIMITED: CPT | Mod: 26,LT,, | Performed by: RADIOLOGY

## 2022-03-02 ENCOUNTER — TELEPHONE (OUTPATIENT)
Dept: BEHAVIORAL HEALTH | Facility: CLINIC | Age: 37
End: 2022-03-02
Payer: COMMERCIAL

## 2022-03-02 ENCOUNTER — TELEPHONE (OUTPATIENT)
Dept: INTERNAL MEDICINE | Facility: CLINIC | Age: 37
End: 2022-03-02
Payer: COMMERCIAL

## 2022-03-02 NOTE — PROGRESS NOTES
Patient was referred to the [facility] Non Opioid program by PCP.  CHW tired to reach out to patient a total of three times.  Patient referral was removed from the [facility] program.  CHW sent follow up message to patient's PCP via [employer].

## 2022-03-02 NOTE — TELEPHONE ENCOUNTER
----- Message from Mack Farmer sent at 3/2/2022 10:33 AM CST -----   CHW tired to reach out to patient a total of three times.  Patient referral was removed from the [facility] program.

## 2022-03-25 ENCOUNTER — TELEPHONE (OUTPATIENT)
Dept: INTERNAL MEDICINE | Facility: CLINIC | Age: 37
End: 2022-03-25
Payer: COMMERCIAL

## 2022-03-29 ENCOUNTER — OFFICE VISIT (OUTPATIENT)
Dept: INTERNAL MEDICINE | Facility: CLINIC | Age: 37
End: 2022-03-29
Payer: COMMERCIAL

## 2022-03-29 DIAGNOSIS — E78.2 MIXED HYPERLIPIDEMIA: ICD-10-CM

## 2022-03-29 DIAGNOSIS — F32.A ANXIETY AND DEPRESSION: Primary | ICD-10-CM

## 2022-03-29 DIAGNOSIS — F41.9 ANXIETY AND DEPRESSION: Primary | ICD-10-CM

## 2022-03-29 PROCEDURE — 99213 OFFICE O/P EST LOW 20 MIN: CPT | Mod: 95,,, | Performed by: INTERNAL MEDICINE

## 2022-03-29 PROCEDURE — 3044F HG A1C LEVEL LT 7.0%: CPT | Mod: CPTII,95,, | Performed by: INTERNAL MEDICINE

## 2022-03-29 PROCEDURE — 99213 PR OFFICE/OUTPT VISIT, EST, LEVL III, 20-29 MIN: ICD-10-PCS | Mod: 95,,, | Performed by: INTERNAL MEDICINE

## 2022-03-29 PROCEDURE — 3044F PR MOST RECENT HEMOGLOBIN A1C LEVEL <7.0%: ICD-10-PCS | Mod: CPTII,95,, | Performed by: INTERNAL MEDICINE

## 2022-03-29 NOTE — PROGRESS NOTES
Telemedicine Video Visit    The patient location is:  Patient Home   The chief complaint leading to consultation is: anxiety; lipids  Visit type: Virtual visit with synchronous audio and video  Total time spent with patient: 15 minutes  Each patient to whom he or she provides medical services by telemedicine is:  (1) informed of the relationship between the physician and patient and the respective role of any other health care provider with respect to management of the patient; and (2) notified that he or she may decline to receive medical services by telemedicine and may withdraw from such care at any time.     On lexapro, initially nauseated.  Had some fatigue, so switched to morning.   Anxiety much better.  She is still working out the best time of day to take the medication.  If she takes the medication at night she feels a little bit hung over the next day.  However she is taking it in the morning and feels a little tired; I recommended that she try to take it around 4 or 5:00 p.m. and let me know how things go with this.    She has not been exercising on a regular basis but is hoping to start that soon.    Sleep is pretty good however she often wakes up because her son has eczema and he comes in and wakes her up in that she has some difficulty falling asleep.  She is working on sleep hygiene.    Lipids are elevated, this may be genetic.  She will work on a high-fiber, plant based eating plan and exercise and we will follow this up within the next year.    Patient Active Problem List   Diagnosis    Cholelithiasis: seen on u/s 2014    Anxiety and depression    Acne    Iron deficiency    Vitamin D insufficiency    Mixed hyperlipidemia     Review of Systems   HENT: Negative for hearing loss.    Eyes: Negative for discharge.   Respiratory: Negative for wheezing.    Cardiovascular: Negative for chest pain and palpitations.   Gastrointestinal: Negative for blood in stool, constipation, diarrhea and vomiting.    Genitourinary: Negative for dysuria and hematuria.   Musculoskeletal: Negative for neck pain.   Neurological: Negative for weakness and headaches.   Endo/Heme/Allergies: Negative for polydipsia.   Psychiatric/Behavioral:        Mood is better, see above, anxiety much improved  Slight fatigue she thinks related to timing of medication     Physical Exam  Constitutional:       Appearance: She is well-developed.   HENT:      Head: Normocephalic and atraumatic.   Eyes:      Extraocular Movements: Extraocular movements intact.      Conjunctiva/sclera: Conjunctivae normal.   Neck:      Thyroid: No thyromegaly.   Pulmonary:      Effort: No respiratory distress.   Neurological:      General: No focal deficit present.      Mental Status: She is alert and oriented to person, place, and time.   Psychiatric:         Mood and Affect: Mood normal.         Behavior: Behavior normal.         Thought Content: Thought content normal.         Judgment: Judgment normal.         Sofi was seen today for anxiety.    Diagnoses and all orders for this visit:    Anxiety and depression    Mixed hyperlipidemia    Continue current regimen  She can experiment with the time of the medication dosing  Exercise and diet issues reviewed  I have asked to follow-up with me within the next 4-6 weeks by [facility], sooner with problems in the interim    Answers for HPI/ROS submitted by the patient on 3/29/2022  activity change: No  unexpected weight change: No  rhinorrhea: No  trouble swallowing: No  visual disturbance: No  chest tightness: No  polyuria: No  difficulty urinating: No  menstrual problem: No  joint swelling: No  arthralgias: No  confusion: No  dysphoric mood: No

## 2022-04-19 ENCOUNTER — PATIENT MESSAGE (OUTPATIENT)
Dept: OBSTETRICS AND GYNECOLOGY | Facility: CLINIC | Age: 37
End: 2022-04-19
Payer: COMMERCIAL

## 2022-04-28 ENCOUNTER — OFFICE VISIT (OUTPATIENT)
Dept: OBSTETRICS AND GYNECOLOGY | Facility: CLINIC | Age: 37
End: 2022-04-28
Payer: COMMERCIAL

## 2022-04-28 VITALS
SYSTOLIC BLOOD PRESSURE: 100 MMHG | BODY MASS INDEX: 24.35 KG/M2 | DIASTOLIC BLOOD PRESSURE: 70 MMHG | HEIGHT: 64 IN | WEIGHT: 142.63 LBS

## 2022-04-28 DIAGNOSIS — N89.8 VAGINAL ODOR: ICD-10-CM

## 2022-04-28 DIAGNOSIS — Z30.9 ENCOUNTER FOR CONTRACEPTIVE MANAGEMENT, UNSPECIFIED TYPE: ICD-10-CM

## 2022-04-28 DIAGNOSIS — Z01.419 ENCOUNTER FOR GYNECOLOGICAL EXAMINATION WITHOUT ABNORMAL FINDING: Primary | ICD-10-CM

## 2022-04-28 PROCEDURE — 3074F PR MOST RECENT SYSTOLIC BLOOD PRESSURE < 130 MM HG: ICD-10-PCS | Mod: CPTII,S$GLB,, | Performed by: OBSTETRICS & GYNECOLOGY

## 2022-04-28 PROCEDURE — 1160F PR REVIEW ALL MEDS BY PRESCRIBER/CLIN PHARMACIST DOCUMENTED: ICD-10-PCS | Mod: CPTII,S$GLB,, | Performed by: OBSTETRICS & GYNECOLOGY

## 2022-04-28 PROCEDURE — 3044F PR MOST RECENT HEMOGLOBIN A1C LEVEL <7.0%: ICD-10-PCS | Mod: CPTII,S$GLB,, | Performed by: OBSTETRICS & GYNECOLOGY

## 2022-04-28 PROCEDURE — 3044F HG A1C LEVEL LT 7.0%: CPT | Mod: CPTII,S$GLB,, | Performed by: OBSTETRICS & GYNECOLOGY

## 2022-04-28 PROCEDURE — 1160F RVW MEDS BY RX/DR IN RCRD: CPT | Mod: CPTII,S$GLB,, | Performed by: OBSTETRICS & GYNECOLOGY

## 2022-04-28 PROCEDURE — 87481 CANDIDA DNA AMP PROBE: CPT | Mod: 59 | Performed by: OBSTETRICS & GYNECOLOGY

## 2022-04-28 PROCEDURE — 99395 PR PREVENTIVE VISIT,EST,18-39: ICD-10-PCS | Mod: S$GLB,,, | Performed by: OBSTETRICS & GYNECOLOGY

## 2022-04-28 PROCEDURE — 3078F PR MOST RECENT DIASTOLIC BLOOD PRESSURE < 80 MM HG: ICD-10-PCS | Mod: CPTII,S$GLB,, | Performed by: OBSTETRICS & GYNECOLOGY

## 2022-04-28 PROCEDURE — 3008F BODY MASS INDEX DOCD: CPT | Mod: CPTII,S$GLB,, | Performed by: OBSTETRICS & GYNECOLOGY

## 2022-04-28 PROCEDURE — 99999 PR PBB SHADOW E&M-EST. PATIENT-LVL III: ICD-10-PCS | Mod: PBBFAC,,, | Performed by: OBSTETRICS & GYNECOLOGY

## 2022-04-28 PROCEDURE — 99395 PREV VISIT EST AGE 18-39: CPT | Mod: S$GLB,,, | Performed by: OBSTETRICS & GYNECOLOGY

## 2022-04-28 PROCEDURE — 3078F DIAST BP <80 MM HG: CPT | Mod: CPTII,S$GLB,, | Performed by: OBSTETRICS & GYNECOLOGY

## 2022-04-28 PROCEDURE — 3074F SYST BP LT 130 MM HG: CPT | Mod: CPTII,S$GLB,, | Performed by: OBSTETRICS & GYNECOLOGY

## 2022-04-28 PROCEDURE — 99999 PR PBB SHADOW E&M-EST. PATIENT-LVL III: CPT | Mod: PBBFAC,,, | Performed by: OBSTETRICS & GYNECOLOGY

## 2022-04-28 PROCEDURE — 1159F PR MEDICATION LIST DOCUMENTED IN MEDICAL RECORD: ICD-10-PCS | Mod: CPTII,S$GLB,, | Performed by: OBSTETRICS & GYNECOLOGY

## 2022-04-28 PROCEDURE — 1159F MED LIST DOCD IN RCRD: CPT | Mod: CPTII,S$GLB,, | Performed by: OBSTETRICS & GYNECOLOGY

## 2022-04-28 PROCEDURE — 3008F PR BODY MASS INDEX (BMI) DOCUMENTED: ICD-10-PCS | Mod: CPTII,S$GLB,, | Performed by: OBSTETRICS & GYNECOLOGY

## 2022-04-28 RX ORDER — NORGESTIMATE AND ETHINYL ESTRADIOL 7DAYSX3 28
1 KIT ORAL DAILY
Qty: 84 TABLET | Refills: 4 | Status: SHIPPED | OUTPATIENT
Start: 2022-04-28 | End: 2023-05-04 | Stop reason: SDUPTHER

## 2022-04-28 NOTE — PROGRESS NOTES
"CC: Well woman exam    Sofi Vidal is a 36 y.o. female  presents for a well woman exam.  She has no issues, problems, or complaints.  She would like a refill on OCPs    Past Medical History:   Diagnosis Date    Abnormal cervical Papanicolaou smear , 12    Colposcopy , HONEY 1    Anxiety     Childhood asthma     Cholelithiasis complicating pregnancy, antepartum        Past Surgical History:   Procedure Laterality Date    COLPOSCOPY      LASIK         OB History    Para Term  AB Living   1 1 1 0 0 1   SAB IAB Ectopic Multiple Live Births   0 0 0 0        # Outcome Date GA Lbr Mahin/2nd Weight Sex Delivery Anes PTL Lv   1 Term                Family History   Problem Relation Age of Onset    Breast cancer Maternal Grandmother 68    Lung cancer Maternal Grandmother     Cancer Maternal Grandmother         breast, lung    Cancer Father         prostate    Anxiety disorder Mother     Parkinsonism Paternal Grandmother     Diabetes Maternal Aunt     Heart disease Sister     Allergy (severe) Son     Colon cancer Neg Hx     Ovarian cancer Neg Hx        Social History     Tobacco Use    Smoking status: Never Smoker    Smokeless tobacco: Never Used   Substance Use Topics    Alcohol use: Yes     Comment: rare    Drug use: No       /70   Ht 5' 4" (1.626 m)   Wt 64.7 kg (142 lb 10.2 oz)   LMP 2022   BMI 24.48 kg/m²     ROS:  GENERAL: Denies weight gain or weight loss. Feeling well overall.   SKIN: Denies rash or lesions.   HEAD: Denies head injury or headache.   NODES: Denies enlarged lymph nodes.   CHEST: Denies chest pain or shortness of breath.   CARDIOVASCULAR: Denies palpitations or left sided chest pain.   ABDOMEN: No abdominal pain, constipation, diarrhea, nausea, vomiting or rectal bleeding.   URINARY: No frequency, dysuria, hematuria, or burning on urination.  REPRODUCTIVE: See HPI.   BREASTS: The patient performs breast self-examination and denies pain, " lumps, or nipple discharge.   HEMATOLOGIC: No easy bruisability or excessive bleeding.  MUSCULOSKELETAL: Denies joint pain or swelling.   NEUROLOGIC: Denies syncope or weakness.   PSYCHIATRIC: Denies depression, anxiety or mood swings.    Physical Exam:    APPEARANCE: Well nourished, well developed, in no acute distress.  AFFECT: WNL, alert and oriented x 3  SKIN: No acne or hirsutism  NECK: Neck symmetric without masses or thyromegaly  NODES: No inguinal, cervical, axillary, or femoral lymph node enlargement  CHEST: Good respiratory effect  ABDOMEN: Soft.  No tenderness or masses.  No hepatosplenomegaly.  No hernias.  BREASTS: Symmetrical, no skin changes or visible lesions.  No palpable masses, nipple discharge bilaterally.  PELVIC: Normal external genitalia without lesions.  Normal hair distribution.  Adequate perineal body, normal urethral meatus.  Vagina moist and well rugated without lesions or discharge.  Cervix pink, without lesions, discharge or tenderness.  No significant cystocele or rectocele.  Bimanual exam shows uterus to be normal size, regular, mobile and nontender.  Adnexa without masses or tenderness.    EXTREMITIES: No edema.    ASSESSMENT AND PLAN  1. Encounter for gynecological examination without abnormal finding     2. Vaginal odor  Vaginosis Screen by DNA Probe   3. Encounter for contraceptive management, unspecified type  norgestimate-ethinyl estradioL (TRI-SPRINTEC, 28,) 0.18/0.215/0.25 mg-35 mcg (28) tablet         Vaginitis prevention including :   a. avoiding feminine products such as deoderant soaps, body wash, bubble bath, douches, scented toilet paper, deoderant tampons or pads, baby or feminine wipes, chronic pad use, etc. and   b. avoiding other vulvovaginal irritants such as long hot baths, humidity, tight, synthetic clothing, chlorine and sitting around in wet bathing suits and   c. wearing cotton underwear, avoiding thong underwear and no underwear to bed and   d. taking showers  instead of baths and use a hair dryer on cool setting afterwards to dry and   e.wearing cotton to exercise and shower immediately after exercise and change clothes and   f. using polyurethane condoms without spermicide if sexually active and symptoms are triggered by intercourse;   Diflucan and Mycolog cream use and potential side effects;   -pelvic rest until symptoms resolve.           Patient was counseled today on A.C.S. Pap guidelines and recommendations for yearly pelvic exams, mammograms and monthly self breast exams; to see her PCP for other health maintenance.     No follow-ups on file.

## 2022-05-03 LAB
BACTERIAL VAGINOSIS DNA: NEGATIVE
CANDIDA GLABRATA DNA: NEGATIVE
CANDIDA KRUSEI DNA: NEGATIVE
CANDIDA RRNA VAG QL PROBE: NEGATIVE
T VAGINALIS RRNA GENITAL QL PROBE: NEGATIVE

## 2022-06-06 ENCOUNTER — PATIENT MESSAGE (OUTPATIENT)
Dept: INTERNAL MEDICINE | Facility: CLINIC | Age: 37
End: 2022-06-06
Payer: COMMERCIAL

## 2022-06-07 ENCOUNTER — OFFICE VISIT (OUTPATIENT)
Dept: INTERNAL MEDICINE | Facility: CLINIC | Age: 37
End: 2022-06-07
Payer: COMMERCIAL

## 2022-06-07 DIAGNOSIS — U07.1 COVID-19: Primary | ICD-10-CM

## 2022-06-07 PROCEDURE — 3044F HG A1C LEVEL LT 7.0%: CPT | Mod: CPTII,95,, | Performed by: INTERNAL MEDICINE

## 2022-06-07 PROCEDURE — 3044F PR MOST RECENT HEMOGLOBIN A1C LEVEL <7.0%: ICD-10-PCS | Mod: CPTII,95,, | Performed by: INTERNAL MEDICINE

## 2022-06-07 PROCEDURE — 99212 OFFICE O/P EST SF 10 MIN: CPT | Mod: 95,,, | Performed by: INTERNAL MEDICINE

## 2022-06-07 PROCEDURE — 99212 PR OFFICE/OUTPT VISIT, EST, LEVL II, 10-19 MIN: ICD-10-PCS | Mod: 95,,, | Performed by: INTERNAL MEDICINE

## 2022-06-07 NOTE — PATIENT INSTRUCTIONS
Your test was POSITIVE for COVID-19 (coronavirus).       Please isolate yourself at home.  You may leave home and/or return to work once the following conditions are met:    If you were not hospitalized and are not moderately to severely immunocompromised:   More than 5 days since symptoms first appeared AND  More than 24 hours fever free without medications AND  Symptoms are improving  Continue to wear a mask around others for 5 additional days.    If you were hospitalized OR are moderately to severely immunocompromised:  More than 20 days since symptoms first appeared  More than 24 hours fever free without medications  Symptoms have improved    If you had no symptoms but tested positive:  More than 5 days since the date of the first positive test (20 days if moderately to severely immunocompromised). If you develop symptoms, then use the guidelines above.  Continue to wear a mask around others for 5 additional days.      Contact Tracing    As one of the next steps, you will receive a call or text from the [place] Department of Health ([facility]) COVID-19 Tracing Team. See the contact information below so you know not to ignore the health departments call. It is important that you contact them back immediately so they can help.      Contact Tracer Number:  [phone]  Caller ID for most carriers: [facility]     What is contact tracing?  Contact tracing is a process that helps identify everyone who has been in close contact with an infected person. Contact tracers let those people know they may have been exposed and guide them on next steps. Confidentiality is important for everyone; no one will be told who may have exposed them to the virus.  Your involvement is important. The more we know about where and how this virus is spreading, the better chance we have at stopping it from spreading further.  What does exposure mean?  Exposure means you have been within 6 feet for more than 15 minutes with a person who  has or had COVID-19.  What kind of questions do the contact tracers ask?  A contact tracer will confirm your basic contact information including name, address, phone number, and next of kin, as well as asking about any symptoms you may have had. Theyll also ask you how you think you may have gotten sick, such as places where you may have been exposed to the virus, and people you were with. Those names will never be shared with anyone outside of that call, and will only be used to help trace and stop the spread of the virus.   I have privacy concerns. How will the state use my information?  Your privacy about your health is important. All calls are completed using call centers that use the appropriate health privacy protection measures (HIPAA compliance), meaning that your patient information is safe. No one will ever ask you any questions related to immigration status. Your health comes first.   Do I have to participate?  You do not have to participate, but we strongly encourage you to. Contact tracing can help us catch and control new outbreaks as theyre developing to keep your friends and family safe.   What if I dont hear from anyone?  If you dont receive a call within 24 hours, you can call the number above right away to inquire about your status. That line is open from 8:00 am - 8:00 p.m., 7 days a week.  Contact tracing saves lives! Together, we have the power to beat this virus and keep our loved ones and neighbors safe.    For more information see CDC link below.      https://www.cdc.gov/coronavirus/2019-ncov/hcp/guidance-prevent-spread.html#precautions        Sources:  [facility], [place] [facility] and [facility]           Sincerely,     Angelica Marin MD

## 2022-08-05 ENCOUNTER — OFFICE VISIT (OUTPATIENT)
Dept: URGENT CARE | Facility: CLINIC | Age: 37
End: 2022-08-05
Payer: COMMERCIAL

## 2022-08-05 VITALS
BODY MASS INDEX: 24.75 KG/M2 | HEART RATE: 88 BPM | DIASTOLIC BLOOD PRESSURE: 71 MMHG | TEMPERATURE: 98 F | WEIGHT: 145 LBS | RESPIRATION RATE: 16 BRPM | HEIGHT: 64 IN | OXYGEN SATURATION: 98 % | SYSTOLIC BLOOD PRESSURE: 113 MMHG

## 2022-08-05 DIAGNOSIS — R05.9 COUGH: ICD-10-CM

## 2022-08-05 DIAGNOSIS — J06.9 VIRAL URI: Primary | ICD-10-CM

## 2022-08-05 PROCEDURE — 1159F MED LIST DOCD IN RCRD: CPT | Mod: CPTII,S$GLB,, | Performed by: NURSE PRACTITIONER

## 2022-08-05 PROCEDURE — 99213 OFFICE O/P EST LOW 20 MIN: CPT | Mod: S$GLB,,, | Performed by: NURSE PRACTITIONER

## 2022-08-05 PROCEDURE — 1159F PR MEDICATION LIST DOCUMENTED IN MEDICAL RECORD: ICD-10-PCS | Mod: CPTII,S$GLB,, | Performed by: NURSE PRACTITIONER

## 2022-08-05 PROCEDURE — 3074F SYST BP LT 130 MM HG: CPT | Mod: CPTII,S$GLB,, | Performed by: NURSE PRACTITIONER

## 2022-08-05 PROCEDURE — 3008F BODY MASS INDEX DOCD: CPT | Mod: CPTII,S$GLB,, | Performed by: NURSE PRACTITIONER

## 2022-08-05 PROCEDURE — 99213 PR OFFICE/OUTPT VISIT, EST, LEVL III, 20-29 MIN: ICD-10-PCS | Mod: S$GLB,,, | Performed by: NURSE PRACTITIONER

## 2022-08-05 PROCEDURE — 3044F PR MOST RECENT HEMOGLOBIN A1C LEVEL <7.0%: ICD-10-PCS | Mod: CPTII,S$GLB,, | Performed by: NURSE PRACTITIONER

## 2022-08-05 PROCEDURE — 3008F PR BODY MASS INDEX (BMI) DOCUMENTED: ICD-10-PCS | Mod: CPTII,S$GLB,, | Performed by: NURSE PRACTITIONER

## 2022-08-05 PROCEDURE — 3078F DIAST BP <80 MM HG: CPT | Mod: CPTII,S$GLB,, | Performed by: NURSE PRACTITIONER

## 2022-08-05 PROCEDURE — 3044F HG A1C LEVEL LT 7.0%: CPT | Mod: CPTII,S$GLB,, | Performed by: NURSE PRACTITIONER

## 2022-08-05 PROCEDURE — 3078F PR MOST RECENT DIASTOLIC BLOOD PRESSURE < 80 MM HG: ICD-10-PCS | Mod: CPTII,S$GLB,, | Performed by: NURSE PRACTITIONER

## 2022-08-05 PROCEDURE — 3074F PR MOST RECENT SYSTOLIC BLOOD PRESSURE < 130 MM HG: ICD-10-PCS | Mod: CPTII,S$GLB,, | Performed by: NURSE PRACTITIONER

## 2022-08-05 RX ORDER — IPRATROPIUM BROMIDE 42 UG/1
2 SPRAY, METERED NASAL 3 TIMES DAILY
Qty: 15 ML | Refills: 0 | Status: SHIPPED | OUTPATIENT
Start: 2022-08-05 | End: 2022-10-19

## 2022-08-05 RX ORDER — PROMETHAZINE HYDROCHLORIDE AND DEXTROMETHORPHAN HYDROBROMIDE 6.25; 15 MG/5ML; MG/5ML
5 SYRUP ORAL NIGHTLY PRN
Qty: 80 ML | Refills: 0 | Status: SHIPPED | OUTPATIENT
Start: 2022-08-05 | End: 2022-10-19

## 2022-08-05 NOTE — PATIENT INSTRUCTIONS
"Cough   If your condition worsens or fails to improve we recommend that you receive another evaluation at the ER immediately or contact your PCP to discuss your concerns or return here. You must understand that you've received an urgent care treatment only and that you may be released before all your medical problems are known or treated. You the patient will arrange for follouwp care as instructed. .  Rest and fluids are important  Can use honey with cheryle to soothe your throat  Take prescription cough meds (pills) as prescribed; take prescription cough syrup at night as needed for cough.  Do not take both the prescribed cough pills and syrup at the same time.   -  Flonase (fluticasone) is a nasal spray which is available over the counter and may help with your symptoms.   -  If you have hypertension avoid using the "D" which is the decongestant.  Instead you can use Coricidin HBP for cold and cough symptoms.    -  If you just have drainage you can take plain Zyrtec, Claritin or Allegra   -  Tylenol or ibuprofen can also be used as directed for pain unless you have an allergy to them or medical condition such as stomach ulcers, kidney or liver disease or blood thinners etc for which you should not be taking these type of medications.   Please follow up with your primary care doctor or specialist in the next 48-72hrs as needed and if no improvement  If you  smoke, please stop smoking.   "

## 2022-08-05 NOTE — PROGRESS NOTES
"Subjective:       Patient ID: Sofi Vidal is a 37 y.o. female.    Vitals:  height is 5' 4" (1.626 m) and weight is 65.8 kg (145 lb). Her temperature is 98.1 °F (36.7 °C). Her blood pressure is 113/71 and her pulse is 88. Her respiration is 16 and oxygen saturation is 98%.     Chief Complaint: URI    Symptoms began on Tuesday. Pt was positive for covid at the end of may     URI   This is a new problem. The current episode started in the past 7 days. The problem has been gradually worsening. There has been no fever. Associated symptoms include congestion, coughing and sinus pain. She has tried decongestant for the symptoms. The treatment provided no relief.       Constitution: Positive for fatigue.   HENT: Positive for congestion, sinus pain and sinus pressure.         Ears feel stuffy   Respiratory: Positive for cough.        Objective:      Physical Exam   Constitutional: No distress.   HENT:   Head: Normocephalic and atraumatic.   Ears:   Right Ear: Tympanic membrane, external ear and ear canal normal.   Left Ear: Tympanic membrane, external ear and ear canal normal.   Eyes: Extraocular movement intact   Pulmonary/Chest: Effort normal and breath sounds normal. No stridor. No respiratory distress. She has no wheezes. She has no rhonchi. She has no rales. She exhibits no tenderness.         Comments: +dry cough  Able to speak in full sentences without difficulty or pause    Abdominal: Normal appearance.   Neurological: no focal deficit. She is alert.   Nursing note and vitals reviewed.        Assessment:       1. Viral URI    2. Cough          Plan:         Viral URI  -     ipratropium (ATROVENT) 42 mcg (0.06 %) nasal spray; 2 sprays by Nasal route 3 (three) times daily.  Dispense: 15 mL; Refill: 0    Cough  -     promethazine-dextromethorphan (PROMETHAZINE-DM) 6.25-15 mg/5 mL Syrp; Take 5 mLs by mouth nightly as needed (cough).  Dispense: 80 mL; Refill: 0    Other orders  -     Cancel: POCT COVID-19 Rapid " "Screening                  Patient Instructions   Cough   If your condition worsens or fails to improve we recommend that you receive another evaluation at the ER immediately or contact your PCP to discuss your concerns or return here. You must understand that you've received an urgent care treatment only and that you may be released before all your medical problems are known or treated. You the patient will arrange for follouwp care as instructed. .  Rest and fluids are important  Can use honey with cheryle to soothe your throat  Take prescription cough meds (pills) as prescribed; take prescription cough syrup at night as needed for cough.  Do not take both the prescribed cough pills and syrup at the same time.   -  Flonase (fluticasone) is a nasal spray which is available over the counter and may help with your symptoms.   -  If you have hypertension avoid using the "D" which is the decongestant.  Instead you can use Coricidin HBP for cold and cough symptoms.    -  If you just have drainage you can take plain Zyrtec, Claritin or Allegra   -  Tylenol or ibuprofen can also be used as directed for pain unless you have an allergy to them or medical condition such as stomach ulcers, kidney or liver disease or blood thinners etc for which you should not be taking these type of medications.   Please follow up with your primary care doctor or specialist in the next 48-72hrs as needed and if no improvement  If you  smoke, please stop smoking.       "

## 2022-09-13 RX ORDER — ESCITALOPRAM OXALATE 10 MG/1
TABLET ORAL
Qty: 90 TABLET | Refills: 2 | Status: SHIPPED | OUTPATIENT
Start: 2022-09-13 | End: 2022-10-19

## 2022-09-13 NOTE — TELEPHONE ENCOUNTER
Refill Decision Note   Sofi Vidal  is requesting a refill authorization.  Brief Assessment and Rationale for Refill:  Approve     Medication Therapy Plan:       Medication Reconciliation Completed: No   Comments:     No Care Gaps recommended.     Note composed:10:01 AM 09/13/2022

## 2022-09-13 NOTE — TELEPHONE ENCOUNTER
No new care gaps identified.  [facility] Embedded Care Gaps. Reference number: [id number]. 9/13/2022   8:07:35 AM CDT

## 2022-09-27 ENCOUNTER — PATIENT MESSAGE (OUTPATIENT)
Dept: OBSTETRICS AND GYNECOLOGY | Facility: CLINIC | Age: 37
End: 2022-09-27
Payer: COMMERCIAL

## 2022-10-04 ENCOUNTER — OFFICE VISIT (OUTPATIENT)
Dept: OBSTETRICS AND GYNECOLOGY | Facility: CLINIC | Age: 37
End: 2022-10-04
Payer: COMMERCIAL

## 2022-10-04 ENCOUNTER — HOSPITAL ENCOUNTER (OUTPATIENT)
Dept: RADIOLOGY | Facility: OTHER | Age: 37
Discharge: HOME | End: 2022-10-04
Attending: STUDENT IN AN ORGANIZED HEALTH CARE EDUCATION/TRAINING PROGRAM
Payer: COMMERCIAL

## 2022-10-04 VITALS
WEIGHT: 149.06 LBS | DIASTOLIC BLOOD PRESSURE: 60 MMHG | HEIGHT: 64 IN | BODY MASS INDEX: 25.45 KG/M2 | SYSTOLIC BLOOD PRESSURE: 100 MMHG

## 2022-10-04 DIAGNOSIS — R10.2 PELVIC PAIN IN FEMALE: ICD-10-CM

## 2022-10-04 DIAGNOSIS — R10.2 PELVIC PAIN IN FEMALE: Primary | ICD-10-CM

## 2022-10-04 LAB
B-HCG UR QL: NEGATIVE
BILIRUB SERPL-MCNC: NEGATIVE MG/DL
BLOOD URINE, POC: ABNORMAL
CLARITY, POC UA: CLEAR
COLOR, POC UA: YELLOW
CTP QC/QA: YES
GLUCOSE UR QL STRIP: NORMAL
KETONES UR QL STRIP: NEGATIVE
LEUKOCYTE ESTERASE URINE, POC: ABNORMAL
NITRITE, POC UA: NEGATIVE
PH, POC UA: 5
PROTEIN, POC: ABNORMAL
SPECIFIC GRAVITY, POC UA: 1.01
UROBILINOGEN, POC UA: NORMAL

## 2022-10-04 PROCEDURE — 87491 CHLMYD TRACH DNA AMP PROBE: CPT | Performed by: STUDENT IN AN ORGANIZED HEALTH CARE EDUCATION/TRAINING PROGRAM

## 2022-10-04 PROCEDURE — 1160F RVW MEDS BY RX/DR IN RCRD: CPT | Mod: CPTII,S$GLB,, | Performed by: STUDENT IN AN ORGANIZED HEALTH CARE EDUCATION/TRAINING PROGRAM

## 2022-10-04 PROCEDURE — 1159F MED LIST DOCD IN RCRD: CPT | Mod: CPTII,S$GLB,, | Performed by: STUDENT IN AN ORGANIZED HEALTH CARE EDUCATION/TRAINING PROGRAM

## 2022-10-04 PROCEDURE — 3074F PR MOST RECENT SYSTOLIC BLOOD PRESSURE < 130 MM HG: ICD-10-PCS | Mod: CPTII,S$GLB,, | Performed by: STUDENT IN AN ORGANIZED HEALTH CARE EDUCATION/TRAINING PROGRAM

## 2022-10-04 PROCEDURE — 81002 POCT URINE DIPSTICK WITHOUT MICROSCOPE: ICD-10-PCS | Mod: S$GLB,,, | Performed by: STUDENT IN AN ORGANIZED HEALTH CARE EDUCATION/TRAINING PROGRAM

## 2022-10-04 PROCEDURE — 81002 URINALYSIS NONAUTO W/O SCOPE: CPT | Mod: S$GLB,,, | Performed by: STUDENT IN AN ORGANIZED HEALTH CARE EDUCATION/TRAINING PROGRAM

## 2022-10-04 PROCEDURE — 3074F SYST BP LT 130 MM HG: CPT | Mod: CPTII,S$GLB,, | Performed by: STUDENT IN AN ORGANIZED HEALTH CARE EDUCATION/TRAINING PROGRAM

## 2022-10-04 PROCEDURE — 81514 NFCT DS BV&VAGINITIS DNA ALG: CPT | Performed by: STUDENT IN AN ORGANIZED HEALTH CARE EDUCATION/TRAINING PROGRAM

## 2022-10-04 PROCEDURE — 76856 US EXAM PELVIC COMPLETE: CPT | Mod: 26,,, | Performed by: RADIOLOGY

## 2022-10-04 PROCEDURE — 76830 US PELVIS COMP WITH TRANSVAG NON-OB (XPD): ICD-10-PCS | Mod: 26,,, | Performed by: RADIOLOGY

## 2022-10-04 PROCEDURE — 3044F HG A1C LEVEL LT 7.0%: CPT | Mod: CPTII,S$GLB,, | Performed by: STUDENT IN AN ORGANIZED HEALTH CARE EDUCATION/TRAINING PROGRAM

## 2022-10-04 PROCEDURE — 3078F DIAST BP <80 MM HG: CPT | Mod: CPTII,S$GLB,, | Performed by: STUDENT IN AN ORGANIZED HEALTH CARE EDUCATION/TRAINING PROGRAM

## 2022-10-04 PROCEDURE — 1159F PR MEDICATION LIST DOCUMENTED IN MEDICAL RECORD: ICD-10-PCS | Mod: CPTII,S$GLB,, | Performed by: STUDENT IN AN ORGANIZED HEALTH CARE EDUCATION/TRAINING PROGRAM

## 2022-10-04 PROCEDURE — 87086 URINE CULTURE/COLONY COUNT: CPT | Performed by: STUDENT IN AN ORGANIZED HEALTH CARE EDUCATION/TRAINING PROGRAM

## 2022-10-04 PROCEDURE — 99214 PR OFFICE/OUTPT VISIT, EST, LEVL IV, 30-39 MIN: ICD-10-PCS | Mod: 25,S$GLB,, | Performed by: STUDENT IN AN ORGANIZED HEALTH CARE EDUCATION/TRAINING PROGRAM

## 2022-10-04 PROCEDURE — 76856 US PELVIS COMP WITH TRANSVAG NON-OB (XPD): ICD-10-PCS | Mod: 26,,, | Performed by: RADIOLOGY

## 2022-10-04 PROCEDURE — 3044F PR MOST RECENT HEMOGLOBIN A1C LEVEL <7.0%: ICD-10-PCS | Mod: CPTII,S$GLB,, | Performed by: STUDENT IN AN ORGANIZED HEALTH CARE EDUCATION/TRAINING PROGRAM

## 2022-10-04 PROCEDURE — 99999 PR PBB SHADOW E&M-EST. PATIENT-LVL IV: ICD-10-PCS | Mod: PBBFAC,,, | Performed by: STUDENT IN AN ORGANIZED HEALTH CARE EDUCATION/TRAINING PROGRAM

## 2022-10-04 PROCEDURE — 99999 PR PBB SHADOW E&M-EST. PATIENT-LVL IV: CPT | Mod: PBBFAC,,, | Performed by: STUDENT IN AN ORGANIZED HEALTH CARE EDUCATION/TRAINING PROGRAM

## 2022-10-04 PROCEDURE — 76830 TRANSVAGINAL US NON-OB: CPT | Mod: 26,,, | Performed by: RADIOLOGY

## 2022-10-04 PROCEDURE — 3008F PR BODY MASS INDEX (BMI) DOCUMENTED: ICD-10-PCS | Mod: CPTII,S$GLB,, | Performed by: STUDENT IN AN ORGANIZED HEALTH CARE EDUCATION/TRAINING PROGRAM

## 2022-10-04 PROCEDURE — 99214 OFFICE O/P EST MOD 30 MIN: CPT | Mod: 25,S$GLB,, | Performed by: STUDENT IN AN ORGANIZED HEALTH CARE EDUCATION/TRAINING PROGRAM

## 2022-10-04 PROCEDURE — 81025 URINE PREGNANCY TEST: CPT | Mod: S$GLB,,, | Performed by: STUDENT IN AN ORGANIZED HEALTH CARE EDUCATION/TRAINING PROGRAM

## 2022-10-04 PROCEDURE — 87591 N.GONORRHOEAE DNA AMP PROB: CPT | Performed by: STUDENT IN AN ORGANIZED HEALTH CARE EDUCATION/TRAINING PROGRAM

## 2022-10-04 PROCEDURE — 76830 TRANSVAGINAL US NON-OB: CPT | Mod: TC

## 2022-10-04 PROCEDURE — 3078F PR MOST RECENT DIASTOLIC BLOOD PRESSURE < 80 MM HG: ICD-10-PCS | Mod: CPTII,S$GLB,, | Performed by: STUDENT IN AN ORGANIZED HEALTH CARE EDUCATION/TRAINING PROGRAM

## 2022-10-04 PROCEDURE — 81025 POCT URINE PREGNANCY: ICD-10-PCS | Mod: S$GLB,,, | Performed by: STUDENT IN AN ORGANIZED HEALTH CARE EDUCATION/TRAINING PROGRAM

## 2022-10-04 PROCEDURE — 1160F PR REVIEW ALL MEDS BY PRESCRIBER/CLIN PHARMACIST DOCUMENTED: ICD-10-PCS | Mod: CPTII,S$GLB,, | Performed by: STUDENT IN AN ORGANIZED HEALTH CARE EDUCATION/TRAINING PROGRAM

## 2022-10-04 PROCEDURE — 3008F BODY MASS INDEX DOCD: CPT | Mod: CPTII,S$GLB,, | Performed by: STUDENT IN AN ORGANIZED HEALTH CARE EDUCATION/TRAINING PROGRAM

## 2022-10-04 NOTE — PROGRESS NOTES
History & Physical  Gynecology      SUBJECTIVE:     Chief Complaint: Pelvic Pain (LLQ for 1 month), Nausea, Constipation (With occ diarrhea), and Vaginal Discharge (Green vaginal discharge started about 3 days ago and happened a week before that)       History of Present Illness:  Sofi Vidal is a 37 y.o. F who presents for 1 month of LLQ abdominal pain that comes and goes. The pain is sometimes accompanied by nausea. She has also noted constipation that sometimes alternates with diarrhea. On two occasions she has noticed a green-ale vaginal discharge. No itching, burning, or odor. She states that her mom had an ovarian cyst and she thought that may be a source of pain. Also considering GI causes given the bowel changes. No major changes in her diet. No dysuria. Taking OCPs for contraception, occasionally misses doses. No concern for pregnancy. Does sometimes have spotting.       Review of patient's allergies indicates:  No Known Allergies    Past Medical History:   Diagnosis Date    Abnormal cervical Papanicolaou smear , 12    Colposcopy , HONEY 1    Anxiety     Childhood asthma     Cholelithiasis complicating pregnancy, antepartum      Past Surgical History:   Procedure Laterality Date    COLPOSCOPY      LASIK       OB History          1    Para   1    Term   1       0    AB   0    Living   1         SAB   0    IAB   0    Ectopic   0    Multiple   0    Live Births                   Family History   Problem Relation Age of Onset    Breast cancer Maternal Grandmother 68    Lung cancer Maternal Grandmother     Cancer Maternal Grandmother         breast, lung    Cancer Father         prostate    Anxiety disorder Mother     Parkinsonism Paternal Grandmother     Diabetes Maternal Aunt     Heart disease Sister     Allergy (severe) Son     Colon cancer Neg Hx     Ovarian cancer Neg Hx      Social History     Tobacco Use    Smoking status: Never    Smokeless tobacco: Never   Substance Use Topics     Alcohol use: Yes     Comment: rare    Drug use: No       Current Outpatient Medications   Medication Sig    cholecalciferol, vitamin D3, (VITAMIN D3) 25 mcg (1,000 unit) capsule Take 2 capsules (2,000 Units total) by mouth once daily.    EScitalopram oxalate (LEXAPRO) 10 MG tablet TAKE 1 TABLET(10 MG) BY MOUTH EVERY DAY    ferrous sulfate (FEOSOL) 325 mg (65 mg iron) Tab tablet Take 1 tablet (325 mg total) by mouth 3 (three) times a week.    ipratropium (ATROVENT) 42 mcg (0.06 %) nasal spray 2 sprays by Nasal route 3 (three) times daily.    LORazepam (ATIVAN) 0.5 MG tablet Take 1 tablet (0.5 mg total) by mouth daily as needed for Anxiety.    norgestimate-ethinyl estradioL (TRI-SPRINTEC, 28,) 0.18/0.215/0.25 mg-35 mcg (28) tablet Take 1 tablet by mouth once daily.    promethazine-dextromethorphan (PROMETHAZINE-DM) 6.25-15 mg/5 mL Syrp Take 5 mLs by mouth nightly as needed (cough).     Current Facility-Administered Medications   Medication    triamcinolone acetonide injection 10 mg         Review of Systems:  Review of Systems   Constitutional:  Negative for chills and fever.   Respiratory:  Negative for shortness of breath.    Cardiovascular:  Negative for chest pain.   Gastrointestinal:  Positive for constipation and diarrhea. Negative for abdominal pain, nausea and vomiting.   Genitourinary:  Positive for pelvic pain and vaginal discharge. Negative for dysuria, menstrual problem, vaginal pain and vaginal odor.   Musculoskeletal:  Positive for arthralgias (Comes and goes after she had a reaction to the flu shot in 2019).   Neurological:  Negative for headaches.      OBJECTIVE:     Physical Exam:  Physical Exam  Vitals reviewed. Exam conducted with a chaperone present.   Constitutional:       General: She is not in acute distress.     Appearance: She is well-developed. She is not ill-appearing, toxic-appearing or diaphoretic.   HENT:      Head: Normocephalic and atraumatic.   Eyes:      Conjunctiva/sclera:  Conjunctivae normal.   Cardiovascular:      Rate and Rhythm: Normal rate.   Pulmonary:      Effort: Pulmonary effort is normal. No respiratory distress.   Abdominal:      Palpations: Abdomen is soft. There is no mass.      Tenderness: There is no abdominal tenderness. There is no guarding or rebound.   Genitourinary:     General: Normal vulva.      Vagina: Vaginal discharge present. No bleeding.      Cervix: No cervical motion tenderness.      Uterus: Not enlarged and not tender.       Adnexa:         Right: No mass, tenderness or fullness.          Left: No mass, tenderness or fullness.        Comments: External genitalia: Normal  Urethral: Nontender, no masses  Urethral meatus: Normal  Bladder: Non-tender  Musculoskeletal:         General: Normal range of motion.      Cervical back: Normal range of motion.   Skin:     General: Skin is warm and dry.   Neurological:      Mental Status: She is alert.   Psychiatric:         Mood and Affect: Mood normal.         Behavior: Behavior normal.         ASSESSMENT:       ICD-10-CM ICD-9-CM    1. Pelvic pain in female  R10.2 625.9 POCT Urine Pregnancy      POCT URINE DIPSTICK WITHOUT MICROSCOPE      C. trachomatis/N. gonorrhoeae by AMP DNA      VAGINOSIS SCREEN BY DNA PROBE      US Pelvis Comp with Transvag NON-OB (xpd      Urine culture             Plan:      -- UPT negative  -- GC/CT and vaginosis collected  -- Urine dip with small blood, no other findings. Will send for culture.  -- Discussed gynecologic and non-gynecologic causes of pelvic pain. Will start with TVUS to assess gynecologic structures.   -- Recommend starting a symptom diary to see if pain correlates with bowel habits.   -- Will follow up based on US results.      Nata Enciso MD

## 2022-10-05 LAB — BACTERIA UR CULT: NO GROWTH

## 2022-10-06 LAB
BACTERIAL VAGINOSIS DNA: NEGATIVE
C TRACH DNA SPEC QL NAA+PROBE: NOT DETECTED
CANDIDA GLABRATA DNA: NEGATIVE
CANDIDA KRUSEI DNA: NEGATIVE
CANDIDA RRNA VAG QL PROBE: NEGATIVE
N GONORRHOEA DNA SPEC QL NAA+PROBE: NOT DETECTED
T VAGINALIS RRNA GENITAL QL PROBE: NEGATIVE

## 2022-10-07 ENCOUNTER — TELEPHONE (OUTPATIENT)
Dept: OBSTETRICS AND GYNECOLOGY | Facility: CLINIC | Age: 37
End: 2022-10-07
Payer: COMMERCIAL

## 2022-10-07 DIAGNOSIS — R10.2 PELVIC PAIN: Primary | ICD-10-CM

## 2022-10-07 NOTE — TELEPHONE ENCOUNTER
Called to discuss results. TVUS WNL. Negative GC/CT, vaginosis, and urine culture. Will place GI referral. All questions answered.      Nata Enciso MD

## 2022-10-19 ENCOUNTER — LAB VISIT (OUTPATIENT)
Dept: LAB | Facility: HOSPITAL | Age: 37
End: 2022-10-19
Attending: INTERNAL MEDICINE
Payer: COMMERCIAL

## 2022-10-19 ENCOUNTER — OFFICE VISIT (OUTPATIENT)
Dept: INTERNAL MEDICINE | Facility: CLINIC | Age: 37
End: 2022-10-19
Payer: COMMERCIAL

## 2022-10-19 ENCOUNTER — TELEPHONE (OUTPATIENT)
Dept: INTERNAL MEDICINE | Facility: CLINIC | Age: 37
End: 2022-10-19
Payer: COMMERCIAL

## 2022-10-19 ENCOUNTER — TELEPHONE (OUTPATIENT)
Dept: RADIOLOGY | Facility: HOSPITAL | Age: 37
End: 2022-10-19
Payer: COMMERCIAL

## 2022-10-19 VITALS
BODY MASS INDEX: 24.75 KG/M2 | HEIGHT: 64 IN | SYSTOLIC BLOOD PRESSURE: 118 MMHG | WEIGHT: 145 LBS | DIASTOLIC BLOOD PRESSURE: 60 MMHG

## 2022-10-19 DIAGNOSIS — E55.9 VITAMIN D INSUFFICIENCY: ICD-10-CM

## 2022-10-19 DIAGNOSIS — K59.00 CONSTIPATION, UNSPECIFIED CONSTIPATION TYPE: ICD-10-CM

## 2022-10-19 DIAGNOSIS — M25.50 ARTHRALGIA, UNSPECIFIED JOINT: ICD-10-CM

## 2022-10-19 DIAGNOSIS — R10.9 ABDOMINAL PAIN, UNSPECIFIED ABDOMINAL LOCATION: Primary | ICD-10-CM

## 2022-10-19 DIAGNOSIS — R11.0 NAUSEA: ICD-10-CM

## 2022-10-19 DIAGNOSIS — E53.8 LOW SERUM VITAMIN B12: ICD-10-CM

## 2022-10-19 DIAGNOSIS — E61.1 IRON DEFICIENCY: ICD-10-CM

## 2022-10-19 DIAGNOSIS — N20.0 RENAL STONE: ICD-10-CM

## 2022-10-19 DIAGNOSIS — R01.1 HEART MURMUR: ICD-10-CM

## 2022-10-19 DIAGNOSIS — M79.622 PAIN IN LEFT AXILLA: ICD-10-CM

## 2022-10-19 DIAGNOSIS — R10.9 ABDOMINAL PAIN, UNSPECIFIED ABDOMINAL LOCATION: ICD-10-CM

## 2022-10-19 LAB
25(OH)D3+25(OH)D2 SERPL-MCNC: 51 NG/ML (ref 30–96)
ALBUMIN SERPL BCP-MCNC: 4.2 G/DL (ref 3.5–5.2)
ALP SERPL-CCNC: 60 U/L (ref 55–135)
ALT SERPL W/O P-5'-P-CCNC: 19 U/L (ref 10–44)
AMYLASE SERPL-CCNC: 34 U/L (ref 20–110)
ANION GAP SERPL CALC-SCNC: 10 MMOL/L (ref 8–16)
AST SERPL-CCNC: 21 U/L (ref 10–40)
BASOPHILS # BLD AUTO: 0.05 K/UL (ref 0–0.2)
BASOPHILS NFR BLD: 0.8 % (ref 0–1.9)
BILIRUB SERPL-MCNC: 1 MG/DL (ref 0.1–1)
BUN SERPL-MCNC: 12 MG/DL (ref 6–20)
CALCIUM SERPL-MCNC: 9.4 MG/DL (ref 8.7–10.5)
CCP AB SER IA-ACNC: <0.5 U/ML
CHLORIDE SERPL-SCNC: 101 MMOL/L (ref 95–110)
CO2 SERPL-SCNC: 26 MMOL/L (ref 23–29)
CREAT SERPL-MCNC: 0.7 MG/DL (ref 0.5–1.4)
CRP SERPL-MCNC: 4.2 MG/L (ref 0–8.2)
DIFFERENTIAL METHOD: ABNORMAL
EOSINOPHIL # BLD AUTO: 0 K/UL (ref 0–0.5)
EOSINOPHIL NFR BLD: 0.6 % (ref 0–8)
ERYTHROCYTE [DISTWIDTH] IN BLOOD BY AUTOMATED COUNT: 13 % (ref 11.5–14.5)
ERYTHROCYTE [SEDIMENTATION RATE] IN BLOOD BY PHOTOMETRIC METHOD: 6 MM/HR (ref 0–36)
EST. GFR  (NO RACE VARIABLE): >60 ML/MIN/1.73 M^2
FERRITIN SERPL-MCNC: 97 NG/ML (ref 20–300)
GLUCOSE SERPL-MCNC: 78 MG/DL (ref 70–110)
HCT VFR BLD AUTO: 45.1 % (ref 37–48.5)
HGB BLD-MCNC: 14 G/DL (ref 12–16)
IMM GRANULOCYTES # BLD AUTO: 0.01 K/UL (ref 0–0.04)
IMM GRANULOCYTES NFR BLD AUTO: 0.2 % (ref 0–0.5)
IRON SERPL-MCNC: 162 UG/DL (ref 30–160)
LIPASE SERPL-CCNC: 25 U/L (ref 4–60)
LYMPHOCYTES # BLD AUTO: 2.4 K/UL (ref 1–4.8)
LYMPHOCYTES NFR BLD: 37.5 % (ref 18–48)
MCH RBC QN AUTO: 29.9 PG (ref 27–31)
MCHC RBC AUTO-ENTMCNC: 31 G/DL (ref 32–36)
MCV RBC AUTO: 96 FL (ref 82–98)
MONOCYTES # BLD AUTO: 0.3 K/UL (ref 0.3–1)
MONOCYTES NFR BLD: 3.9 % (ref 4–15)
NEUTROPHILS # BLD AUTO: 3.7 K/UL (ref 1.8–7.7)
NEUTROPHILS NFR BLD: 57 % (ref 38–73)
NRBC BLD-RTO: 0 /100 WBC
PLATELET # BLD AUTO: 307 K/UL (ref 150–450)
PMV BLD AUTO: 11.4 FL (ref 9.2–12.9)
POTASSIUM SERPL-SCNC: 3.9 MMOL/L (ref 3.5–5.1)
PROT SERPL-MCNC: 7.6 G/DL (ref 6–8.4)
RBC # BLD AUTO: 4.69 M/UL (ref 4–5.4)
RHEUMATOID FACT SERPL-ACNC: <13 IU/ML (ref 0–15)
SATURATED IRON: 36 % (ref 20–50)
SODIUM SERPL-SCNC: 137 MMOL/L (ref 136–145)
TOTAL IRON BINDING CAPACITY: 445 UG/DL (ref 250–450)
TRANSFERRIN SERPL-MCNC: 301 MG/DL (ref 200–375)
TSH SERPL DL<=0.005 MIU/L-ACNC: 1.52 UIU/ML (ref 0.4–4)
VIT B12 SERPL-MCNC: 198 PG/ML (ref 210–950)
WBC # BLD AUTO: 6.48 K/UL (ref 3.9–12.7)

## 2022-10-19 PROCEDURE — 3074F SYST BP LT 130 MM HG: CPT | Mod: CPTII,S$GLB,, | Performed by: INTERNAL MEDICINE

## 2022-10-19 PROCEDURE — 36415 COLL VENOUS BLD VENIPUNCTURE: CPT | Performed by: INTERNAL MEDICINE

## 2022-10-19 PROCEDURE — 84466 ASSAY OF TRANSFERRIN: CPT | Performed by: INTERNAL MEDICINE

## 2022-10-19 PROCEDURE — 99215 OFFICE O/P EST HI 40 MIN: CPT | Mod: S$GLB,,, | Performed by: INTERNAL MEDICINE

## 2022-10-19 PROCEDURE — 99999 PR PBB SHADOW E&M-EST. PATIENT-LVL IV: CPT | Mod: PBBFAC,,, | Performed by: INTERNAL MEDICINE

## 2022-10-19 PROCEDURE — 81003 URINALYSIS AUTO W/O SCOPE: CPT | Performed by: INTERNAL MEDICINE

## 2022-10-19 PROCEDURE — 80053 COMPREHEN METABOLIC PANEL: CPT | Performed by: INTERNAL MEDICINE

## 2022-10-19 PROCEDURE — 83690 ASSAY OF LIPASE: CPT | Performed by: INTERNAL MEDICINE

## 2022-10-19 PROCEDURE — 86140 C-REACTIVE PROTEIN: CPT | Performed by: INTERNAL MEDICINE

## 2022-10-19 PROCEDURE — 3044F HG A1C LEVEL LT 7.0%: CPT | Mod: CPTII,S$GLB,, | Performed by: INTERNAL MEDICINE

## 2022-10-19 PROCEDURE — 3078F DIAST BP <80 MM HG: CPT | Mod: CPTII,S$GLB,, | Performed by: INTERNAL MEDICINE

## 2022-10-19 PROCEDURE — 3074F PR MOST RECENT SYSTOLIC BLOOD PRESSURE < 130 MM HG: ICD-10-PCS | Mod: CPTII,S$GLB,, | Performed by: INTERNAL MEDICINE

## 2022-10-19 PROCEDURE — 82306 VITAMIN D 25 HYDROXY: CPT | Performed by: INTERNAL MEDICINE

## 2022-10-19 PROCEDURE — 82728 ASSAY OF FERRITIN: CPT | Performed by: INTERNAL MEDICINE

## 2022-10-19 PROCEDURE — 3044F PR MOST RECENT HEMOGLOBIN A1C LEVEL <7.0%: ICD-10-PCS | Mod: CPTII,S$GLB,, | Performed by: INTERNAL MEDICINE

## 2022-10-19 PROCEDURE — 86431 RHEUMATOID FACTOR QUANT: CPT | Performed by: INTERNAL MEDICINE

## 2022-10-19 PROCEDURE — 86038 ANTINUCLEAR ANTIBODIES: CPT | Performed by: INTERNAL MEDICINE

## 2022-10-19 PROCEDURE — 1159F MED LIST DOCD IN RCRD: CPT | Mod: CPTII,S$GLB,, | Performed by: INTERNAL MEDICINE

## 2022-10-19 PROCEDURE — 82150 ASSAY OF AMYLASE: CPT | Performed by: INTERNAL MEDICINE

## 2022-10-19 PROCEDURE — 85652 RBC SED RATE AUTOMATED: CPT | Performed by: INTERNAL MEDICINE

## 2022-10-19 PROCEDURE — 1159F PR MEDICATION LIST DOCUMENTED IN MEDICAL RECORD: ICD-10-PCS | Mod: CPTII,S$GLB,, | Performed by: INTERNAL MEDICINE

## 2022-10-19 PROCEDURE — 82607 VITAMIN B-12: CPT | Performed by: INTERNAL MEDICINE

## 2022-10-19 PROCEDURE — 3078F PR MOST RECENT DIASTOLIC BLOOD PRESSURE < 80 MM HG: ICD-10-PCS | Mod: CPTII,S$GLB,, | Performed by: INTERNAL MEDICINE

## 2022-10-19 PROCEDURE — 85025 COMPLETE CBC W/AUTO DIFF WBC: CPT | Performed by: INTERNAL MEDICINE

## 2022-10-19 PROCEDURE — 86235 NUCLEAR ANTIGEN ANTIBODY: CPT | Mod: 59 | Performed by: INTERNAL MEDICINE

## 2022-10-19 PROCEDURE — 86039 ANTINUCLEAR ANTIBODIES (ANA): CPT | Performed by: INTERNAL MEDICINE

## 2022-10-19 PROCEDURE — 86200 CCP ANTIBODY: CPT | Performed by: INTERNAL MEDICINE

## 2022-10-19 PROCEDURE — 84443 ASSAY THYROID STIM HORMONE: CPT | Performed by: INTERNAL MEDICINE

## 2022-10-19 PROCEDURE — 99999 PR PBB SHADOW E&M-EST. PATIENT-LVL IV: ICD-10-PCS | Mod: PBBFAC,,, | Performed by: INTERNAL MEDICINE

## 2022-10-19 PROCEDURE — 99215 PR OFFICE/OUTPT VISIT, EST, LEVL V, 40-54 MIN: ICD-10-PCS | Mod: S$GLB,,, | Performed by: INTERNAL MEDICINE

## 2022-10-19 NOTE — PROGRESS NOTES
Patient ID: Sofi Vidal is a 37 y.o. female.    Chief Complaint: Abdominal Pain      Assessment:       1. Abdominal pain, unspecified abdominal location    2. Iron deficiency    3. Vitamin D insufficiency    4. Constipation, unspecified constipation type    5. Low serum vitamin B12    6. Nausea    7. Arthralgia, unspecified joint    8. Renal stone: R non obstructing 2/22    9. Pain in left axilla    10. Heart murmur          Plan:     Sofi was seen today for abdominal pain.    Diagnoses and all orders for this visit:    Abdominal pain, unspecified abdominal location  -     Urinalysis, Reflex to Urine Culture Urine, Clean Catch  -     POCT urinalysis, dipstick or tablet reag  -     Comprehensive Metabolic Panel; Future    Iron deficiency  -     CBC Auto Differential; Future  -     Ferritin; Future  -     Iron and TIBC; Future    Vitamin D insufficiency  -     Vitamin D; Future    Constipation, unspecified constipation type  -     TSH; Future    Low serum vitamin B12  -     Vitamin B12; Future    Nausea  -     Amylase; Future  -     Lipase; Future    Arthralgia, unspecified joint  -     CHRIS; Future  -     Cyclic citrul peptide antibody, IgG; Future  -     C-reactive protein; Future  -     Rheumatoid factor; Future  -     Sedimentation rate, manual; Future    Renal stone: R non obstructing 2/22; issues reviewed.  Hydration, alarm symptoms discussed.  Urology follow-up    Pain in left axilla  -     US Soft Tissue Axilla, Left; Future    Heart murmur  -     Echo       Keep track of symptoms, diet, lifestyle issues    I will review all studies and determine further tx depending on findings    Patient evaluated for over 45 minutes with this appoinment, including diagnostic testing and treatment.  All questions answered,  chart reviewed and care coordinated.      Subjective:   Urgent visit    Left lower quadrant discomfort.  Started about 2 months ago.  LLQ, somewhat sharp.  It would come and go.  Sx never really  resolved.  Then sometimes had nausea and even threw up on a few occasions.      Now also may radiate to the back above the hip.      No dysuria, no frequency.      Some constipation alternating with diarrhea.  No blood in the stool although sometimes a darker color. No relationship to food or meals.  No change with BMs.    Low grade nausea.    No changes with her menstrual cycle, but may have more cramps with her period.    Before this started did no change diet; since trying to add more fiber, constipation is better.    Slight vaginal discharge- rarely- but saw GYN and no abnormalities identified.    Seen by her gynecologist, had a pelvic ultrasound that was acceptable, no abnormalities identified.    Patient Active Problem List:     Cholelithiasis: seen on u/s 2014     Anxiety and depression     Acne     Iron deficiency     Vitamin D insufficiency     Mixed hyperlipidemia     Renal stone: R non obstructing 2/22     B12 deficiency       Abdominal Pain  This is a new problem. The current episode started more than 1 month ago. The onset quality is gradual. The problem occurs daily. The most recent episode lasted 2 Days. The problem has been waxing and waning. The pain is located in the left flank. The pain is at a severity of 4/10. The pain is mild. The quality of the pain is aching, burning, dull and a sensation of fullness. Associated symptoms include arthralgias, belching, constipation, diarrhea, nausea and vomiting. Pertinent negatives include no anorexia, dysuria, fever, flatus, frequency, headaches, hematochezia, hematuria, melena, myalgias or weight loss. Nothing aggravates the pain. The pain is relieved by Nothing. She has tried antacids for the symptoms. The treatment provided no relief. Prior diagnostic workup includes ultrasound. Her past medical history is significant for gallstones. There is no history of abdominal surgery, colon cancer, Crohn's disease, GERD, irritable bowel syndrome, pancreatitis, PUD  or ulcerative colitis. Patient's medical history includes kidney stones. Patient's medical history does not include UTI.     Review of Systems   Constitutional:  Negative for fever and weight loss.   Cardiovascular:         No edenilson chest pain, on rare occasions may have some discomfort in the left axillary area, intermittently.  No breast pain or masses.   Gastrointestinal:  Positive for abdominal pain, constipation, diarrhea, nausea and vomiting. Negative for anorexia, flatus, hematochezia and melena.   Genitourinary:  Negative for dysuria, frequency and hematuria.   Musculoskeletal:  Positive for arthralgias. Negative for myalgias.   Neurological:  Negative for headaches.       Objective:      Physical Exam  Constitutional:       Appearance: She is well-developed.   HENT:      Head: Normocephalic and atraumatic.   Cardiovascular:      Rate and Rhythm: Normal rate and regular rhythm.      Heart sounds: No murmur heard.     Comments: Faint systolic murmur, she states she was told she had this when she was a baby  Pulmonary:      Effort: Pulmonary effort is normal. No respiratory distress.      Breath sounds: Normal breath sounds. No wheezing.   Abdominal:      General: There is no distension.      Palpations: Abdomen is soft.      Comments: Minimal discomfort over left lower quadrant, no rebound or guarding   Musculoskeletal:      Cervical back: Normal range of motion and neck supple.      Comments: No CVA pain.  Normal strength UE and LE wnl.  No pain over spine   Skin:     General: Skin is warm and dry.   Neurological:      Mental Status: She is oriented to person, place, and time.      Cranial Nerves: No cranial nerve deficit.      Deep Tendon Reflexes: Reflexes normal.   Psychiatric:         Behavior: Behavior normal.           Health Maintenance Due   Topic Date Due    COVID-19 Vaccine (3 - Booster for Pfizer series) 04/14/2022

## 2022-10-19 NOTE — TELEPHONE ENCOUNTER
----- Message from Chely Brown sent at 10/19/2022 12:02 PM CDT -----  Regarding: US  Please assist with scheduling appointment

## 2022-10-20 ENCOUNTER — HOSPITAL ENCOUNTER (OUTPATIENT)
Dept: RADIOLOGY | Facility: OTHER | Age: 37
Discharge: HOME | End: 2022-10-20
Attending: INTERNAL MEDICINE
Payer: COMMERCIAL

## 2022-10-20 ENCOUNTER — PATIENT MESSAGE (OUTPATIENT)
Dept: INTERNAL MEDICINE | Facility: CLINIC | Age: 37
End: 2022-10-20
Payer: COMMERCIAL

## 2022-10-20 ENCOUNTER — HOSPITAL ENCOUNTER (OUTPATIENT)
Dept: CARDIOLOGY | Facility: HOSPITAL | Age: 37
Discharge: HOME | End: 2022-10-20
Attending: INTERNAL MEDICINE
Payer: COMMERCIAL

## 2022-10-20 ENCOUNTER — TELEPHONE (OUTPATIENT)
Dept: INTERNAL MEDICINE | Facility: CLINIC | Age: 37
End: 2022-10-20
Payer: COMMERCIAL

## 2022-10-20 VITALS
BODY MASS INDEX: 24.75 KG/M2 | DIASTOLIC BLOOD PRESSURE: 60 MMHG | HEART RATE: 71 BPM | HEIGHT: 64 IN | SYSTOLIC BLOOD PRESSURE: 118 MMHG | WEIGHT: 145 LBS

## 2022-10-20 DIAGNOSIS — E53.8 B12 DEFICIENCY: ICD-10-CM

## 2022-10-20 DIAGNOSIS — R76.8 POSITIVE ANA (ANTINUCLEAR ANTIBODY): Primary | ICD-10-CM

## 2022-10-20 DIAGNOSIS — R10.9 ABDOMINAL PAIN, UNSPECIFIED ABDOMINAL LOCATION: ICD-10-CM

## 2022-10-20 LAB
ANA PATTERN 1: NORMAL
ANA SER QL IF: POSITIVE
ANA TITR SER IF: NORMAL {TITER}
ASCENDING AORTA: 2.88 CM
AV INDEX (PROSTH): 0.9
AV MEAN GRADIENT: 2 MMHG
AV PEAK GRADIENT: 4 MMHG
AV VALVE AREA: 2.61 CM2
AV VELOCITY RATIO: 0.97
BILIRUB UR QL STRIP: NEGATIVE
BSA FOR ECHO PROCEDURE: 1.72 M2
CLARITY UR REFRACT.AUTO: CLEAR
COLOR UR AUTO: YELLOW
CV ECHO LV RWT: 0.34 CM
DOP CALC AO PEAK VEL: 0.94 M/S
DOP CALC AO VTI: 18.71 CM
DOP CALC LVOT AREA: 2.9 CM2
DOP CALC LVOT DIAMETER: 1.92 CM
DOP CALC LVOT PEAK VEL: 0.91 M/S
DOP CALC LVOT STROKE VOLUME: 48.85 CM3
DOP CALCLVOT PEAK VEL VTI: 16.88 CM
E WAVE DECELERATION TIME: 179.84 MSEC
E/A RATIO: 1.61
E/E' RATIO: 4.73 M/S
ECHO LV POSTERIOR WALL: 0.73 CM (ref 0.6–1.1)
EJECTION FRACTION: 65 %
FRACTIONAL SHORTENING: 34 % (ref 28–44)
GLUCOSE UR QL STRIP: NEGATIVE
HGB UR QL STRIP: NEGATIVE
INTERVENTRICULAR SEPTUM: 0.63 CM (ref 0.6–1.1)
IVRT: 74.22 MSEC
KETONES UR QL STRIP: NEGATIVE
LA MAJOR: 3.68 CM
LA MINOR: 3.65 CM
LA WIDTH: 3.55 CM
LEFT ATRIUM SIZE: 2.72 CM
LEFT ATRIUM VOLUME INDEX MOD: 19.8 ML/M2
LEFT ATRIUM VOLUME INDEX: 17.6 ML/M2
LEFT ATRIUM VOLUME MOD: 33.88 CM3
LEFT ATRIUM VOLUME: 30.08 CM3
LEFT INTERNAL DIMENSION IN SYSTOLE: 2.85 CM (ref 2.1–4)
LEFT VENTRICLE DIASTOLIC VOLUME INDEX: 49.87 ML/M2
LEFT VENTRICLE DIASTOLIC VOLUME: 85.28 ML
LEFT VENTRICLE MASS INDEX: 51 G/M2
LEFT VENTRICLE SYSTOLIC VOLUME INDEX: 18 ML/M2
LEFT VENTRICLE SYSTOLIC VOLUME: 30.85 ML
LEFT VENTRICULAR INTERNAL DIMENSION IN DIASTOLE: 4.35 CM (ref 3.5–6)
LEFT VENTRICULAR MASS: 87.01 G
LEUKOCYTE ESTERASE UR QL STRIP: NEGATIVE
LV LATERAL E/E' RATIO: 4.18 M/S
LV SEPTAL E/E' RATIO: 5.46 M/S
MV A" WAVE DURATION": 12.56 MSEC
MV PEAK A VEL: 0.44 M/S
MV PEAK E VEL: 0.71 M/S
MV STENOSIS PRESSURE HALF TIME: 52.15 MS
MV VALVE AREA P 1/2 METHOD: 4.22 CM2
NITRITE UR QL STRIP: NEGATIVE
PH UR STRIP: 6 [PH] (ref 5–8)
PISA TR MAX VEL: 2.83 M/S
PROT UR QL STRIP: NEGATIVE
PULM VEIN S/D RATIO: 1
PV PEAK D VEL: 0.5 M/S
PV PEAK S VEL: 0.5 M/S
RA MAJOR: 3.8 CM
RA PRESSURE: 3 MMHG
RA WIDTH: 3.87 CM
RIGHT VENTRICULAR END-DIASTOLIC DIMENSION: 3.61 CM
RV TISSUE DOPPLER FREE WALL SYSTOLIC VELOCITY 1 (APICAL 4 CHAMBER VIEW): 14.53 CM/S
SINUS: 2.8 CM
SP GR UR STRIP: 1.02 (ref 1–1.03)
STJ: 2.51 CM
TDI LATERAL: 0.17 M/S
TDI SEPTAL: 0.13 M/S
TDI: 0.15 M/S
TR MAX PG: 32 MMHG
TRICUSPID ANNULAR PLANE SYSTOLIC EXCURSION: 1.92 CM
TV REST PULMONARY ARTERY PRESSURE: 35 MMHG
URN SPEC COLLECT METH UR: NORMAL

## 2022-10-20 PROCEDURE — 74177 CT ABD & PELVIS W/CONTRAST: CPT | Mod: 26,,, | Performed by: RADIOLOGY

## 2022-10-20 PROCEDURE — 93306 TTE W/DOPPLER COMPLETE: CPT | Mod: 26,,, | Performed by: INTERNAL MEDICINE

## 2022-10-20 PROCEDURE — 74177 CT ABDOMEN PELVIS WITH CONTRAST: ICD-10-PCS | Mod: 26,,, | Performed by: RADIOLOGY

## 2022-10-20 PROCEDURE — 25500020 PHARM REV CODE 255: Performed by: INTERNAL MEDICINE

## 2022-10-20 PROCEDURE — 74177 CT ABD & PELVIS W/CONTRAST: CPT | Mod: TC

## 2022-10-20 PROCEDURE — A9698 NON-RAD CONTRAST MATERIALNOC: HCPCS | Performed by: INTERNAL MEDICINE

## 2022-10-20 PROCEDURE — 93306 TTE W/DOPPLER COMPLETE: CPT

## 2022-10-20 PROCEDURE — 93306 ECHO (CUPID ONLY): ICD-10-PCS | Mod: 26,,, | Performed by: INTERNAL MEDICINE

## 2022-10-20 RX ADMIN — IOHEXOL 75 ML: 350 INJECTION, SOLUTION INTRAVENOUS at 05:10

## 2022-10-20 RX ADMIN — IOHEXOL 1000 ML: 9 SOLUTION ORAL at 05:10

## 2022-10-24 ENCOUNTER — TELEPHONE (OUTPATIENT)
Dept: INTERNAL MEDICINE | Facility: CLINIC | Age: 37
End: 2022-10-24
Payer: COMMERCIAL

## 2022-10-24 DIAGNOSIS — N20.0 RENAL STONE: Primary | ICD-10-CM

## 2022-10-24 LAB
ANTI SM ANTIBODY: 0.08 RATIO (ref 0–0.99)
ANTI SM/RNP ANTIBODY: 0.07 RATIO (ref 0–0.99)
ANTI-SM INTERPRETATION: NEGATIVE
ANTI-SM/RNP INTERPRETATION: NEGATIVE
ANTI-SSA ANTIBODY: 0.06 RATIO (ref 0–0.99)
ANTI-SSA INTERPRETATION: NEGATIVE
ANTI-SSB ANTIBODY: 0.05 RATIO (ref 0–0.99)
ANTI-SSB INTERPRETATION: NEGATIVE
DSDNA AB SER-ACNC: NORMAL [IU]/ML

## 2022-10-24 NOTE — TELEPHONE ENCOUNTER
I spoke to pt, informed her that Dr Marin is still waiting on the final results from the CHRIS, that it is positive but it may not be significant, and that Dr. Marin recommends we schedule her for a Rheumatology and Urology appointments. I did advise pt that this may take a couple of months. Pt verbalized understanding. I reached out to both departments to have them call pt for scheduling.

## 2022-10-24 NOTE — TELEPHONE ENCOUNTER
Please let her know that I am still waiting on the final results from the CHRIS.  It is positive but it may not be significant.  I would recommend we schedule her for a Rheumatology appointment, orders are in.  This may take a couple of months.  She also needs to schedule the Urology appointment, orders are in for that as well, thank you

## 2022-10-24 NOTE — TELEPHONE ENCOUNTER
----- Message from Mariluz Santoro LPN sent at 10/24/2022  4:46 PM CDT -----  Regarding: FW: Positive CHRIS  Just an FYI.....      ----- Message -----  From: Cristiane Mccarty MA  Sent: 10/24/2022   4:44 PM CDT  To: Mariluz Santoro LPN  Subject: RE: Positive CHRIS                                 First available will be in February at this time with a fellow every one in this office is currently booked. Once I have her scheduled she will be placed on a wait list.    Cristiane  ----- Message -----  From: Mariluz Santoro LPN  Sent: 10/24/2022   4:09 PM CDT  To: [facility] Rheumatology Clinical Support Staff  Subject: Positive CHRIS                                     Good afternoon, can your staff please reach out to this pt to assist in scheduling a clinic visit, per request of Dr. Marin, for a dx of a positive CHRIS? Thanks in advance for your time and attention. Have a good day.     Thank you,  Ifrah Santoro LPN

## 2022-10-25 ENCOUNTER — TELEPHONE (OUTPATIENT)
Dept: UROLOGY | Facility: CLINIC | Age: 37
End: 2022-10-25
Payer: COMMERCIAL

## 2022-10-25 NOTE — TELEPHONE ENCOUNTER
----- Message from Rosario Horner RN sent at 10/24/2022  5:15 PM CDT -----  Regarding: FW: Appointment    ----- Message -----  From: Mariluz Santoro LPN  Sent: 10/24/2022   4:36 PM CDT  To: [facility] Urology Clinical Staff  Subject: Appointment                                      Good afternoon, can your staff please reach out to this pt to assist in scheduling a clinic visit, per request of Dr. Marin? Thanks in advance for your time and attention. Have a good day.     Thank you,  Ifrah Santoro LPN

## 2022-10-26 ENCOUNTER — OFFICE VISIT (OUTPATIENT)
Dept: UROLOGY | Facility: CLINIC | Age: 37
End: 2022-10-26
Payer: COMMERCIAL

## 2022-10-26 ENCOUNTER — HOSPITAL ENCOUNTER (OUTPATIENT)
Dept: RADIOLOGY | Facility: HOSPITAL | Age: 37
Discharge: HOME | End: 2022-10-26
Attending: INTERNAL MEDICINE
Payer: COMMERCIAL

## 2022-10-26 ENCOUNTER — HOSPITAL ENCOUNTER (OUTPATIENT)
Dept: RADIOLOGY | Facility: HOSPITAL | Age: 37
Discharge: HOME | End: 2022-10-26
Attending: UROLOGY
Payer: COMMERCIAL

## 2022-10-26 VITALS
HEIGHT: 64 IN | BODY MASS INDEX: 25.6 KG/M2 | DIASTOLIC BLOOD PRESSURE: 79 MMHG | WEIGHT: 149.94 LBS | SYSTOLIC BLOOD PRESSURE: 100 MMHG | HEART RATE: 83 BPM

## 2022-10-26 DIAGNOSIS — N20.0 RENAL STONE: ICD-10-CM

## 2022-10-26 DIAGNOSIS — N20.0 RENAL CALCULUS: ICD-10-CM

## 2022-10-26 DIAGNOSIS — N28.89 CALIECTASIS: Primary | ICD-10-CM

## 2022-10-26 DIAGNOSIS — N13.30 HYDRONEPHROSIS, UNSPECIFIED HYDRONEPHROSIS TYPE: ICD-10-CM

## 2022-10-26 DIAGNOSIS — M79.622 PAIN IN LEFT AXILLA: ICD-10-CM

## 2022-10-26 PROCEDURE — 76882 US LMTD JT/FCL EVL NVASC XTR: CPT | Mod: 26,LT,, | Performed by: RADIOLOGY

## 2022-10-26 PROCEDURE — 99204 OFFICE O/P NEW MOD 45 MIN: CPT | Mod: S$GLB,,, | Performed by: UROLOGY

## 2022-10-26 PROCEDURE — 1159F PR MEDICATION LIST DOCUMENTED IN MEDICAL RECORD: ICD-10-PCS | Mod: CPTII,S$GLB,, | Performed by: UROLOGY

## 2022-10-26 PROCEDURE — 3044F HG A1C LEVEL LT 7.0%: CPT | Mod: CPTII,S$GLB,, | Performed by: UROLOGY

## 2022-10-26 PROCEDURE — 3074F PR MOST RECENT SYSTOLIC BLOOD PRESSURE < 130 MM HG: ICD-10-PCS | Mod: CPTII,S$GLB,, | Performed by: UROLOGY

## 2022-10-26 PROCEDURE — 76882 US LMTD JT/FCL EVL NVASC XTR: CPT | Mod: TC,LT

## 2022-10-26 PROCEDURE — 99204 PR OFFICE/OUTPT VISIT, NEW, LEVL IV, 45-59 MIN: ICD-10-PCS | Mod: S$GLB,,, | Performed by: UROLOGY

## 2022-10-26 PROCEDURE — 1160F RVW MEDS BY RX/DR IN RCRD: CPT | Mod: CPTII,S$GLB,, | Performed by: UROLOGY

## 2022-10-26 PROCEDURE — 3078F DIAST BP <80 MM HG: CPT | Mod: CPTII,S$GLB,, | Performed by: UROLOGY

## 2022-10-26 PROCEDURE — 3044F PR MOST RECENT HEMOGLOBIN A1C LEVEL <7.0%: ICD-10-PCS | Mod: CPTII,S$GLB,, | Performed by: UROLOGY

## 2022-10-26 PROCEDURE — 74018 RADEX ABDOMEN 1 VIEW: CPT | Mod: 26,,, | Performed by: RADIOLOGY

## 2022-10-26 PROCEDURE — 3074F SYST BP LT 130 MM HG: CPT | Mod: CPTII,S$GLB,, | Performed by: UROLOGY

## 2022-10-26 PROCEDURE — 99999 PR PBB SHADOW E&M-EST. PATIENT-LVL IV: CPT | Mod: PBBFAC,,, | Performed by: UROLOGY

## 2022-10-26 PROCEDURE — 74018 RADEX ABDOMEN 1 VIEW: CPT | Mod: TC

## 2022-10-26 PROCEDURE — 1160F PR REVIEW ALL MEDS BY PRESCRIBER/CLIN PHARMACIST DOCUMENTED: ICD-10-PCS | Mod: CPTII,S$GLB,, | Performed by: UROLOGY

## 2022-10-26 PROCEDURE — 74018 XR ABDOMEN AP 1 VIEW: ICD-10-PCS | Mod: 26,,, | Performed by: RADIOLOGY

## 2022-10-26 PROCEDURE — 76882 US AXILLA ONLY (BREAST IMAGING) LEFT: ICD-10-PCS | Mod: 26,LT,, | Performed by: RADIOLOGY

## 2022-10-26 PROCEDURE — 3078F PR MOST RECENT DIASTOLIC BLOOD PRESSURE < 80 MM HG: ICD-10-PCS | Mod: CPTII,S$GLB,, | Performed by: UROLOGY

## 2022-10-26 PROCEDURE — 1159F MED LIST DOCD IN RCRD: CPT | Mod: CPTII,S$GLB,, | Performed by: UROLOGY

## 2022-10-26 PROCEDURE — 99999 PR PBB SHADOW E&M-EST. PATIENT-LVL IV: ICD-10-PCS | Mod: PBBFAC,,, | Performed by: UROLOGY

## 2022-10-26 NOTE — PROGRESS NOTES
Subjective:       Patient ID: Sofi Vidal is a 37 y.o. female.    Chief Complaint: Nephrolithiasis    HPI patient with some atypical left flank pain actually away from the kidney negative urinalysis no history of stones.  She had a renal ultrasound showing some minimal dilatation bilaterally and a CT scan demonstrating a 3 mm lower pole stone which is nonobstructing.  Urinalysis is negative.  I reviewed x-rays and I do not believe that she has a UPJ obstruction but will get a Lasix renal scan to prove this.  No recent urinary tract infections.  She did have with sounds like pyelonephritis as a child but nothing since then    Past Medical History:   Diagnosis Date    Abnormal cervical Papanicolaou smear 2011, 11-19-12    Colposcopy , HONEY 1    Anxiety     Childhood asthma     Cholelithiasis complicating pregnancy, antepartum        Past Surgical History:   Procedure Laterality Date    COLPOSCOPY      LASIK         Family History   Problem Relation Age of Onset    Anxiety disorder Mother     Cancer Father         prostate    Heart disease Sister         MVP    Allergy (severe) Son     Diabetes Maternal Aunt     Breast cancer Maternal Grandmother 68    Lung cancer Maternal Grandmother     Cancer Maternal Grandmother         breast, lung    Parkinsonism Paternal Grandmother     Colon cancer Neg Hx     Ovarian cancer Neg Hx        Social History     Socioeconomic History    Marital status:     Number of children: 1   Tobacco Use    Smoking status: Never    Smokeless tobacco: Never   Substance and Sexual Activity    Alcohol use: Yes     Comment: rare    Drug use: No    Sexual activity: Not Currently     Partners: Male     Birth control/protection: OCP   Other Topics Concern    Are you pregnant or think you may be? No    Breast-feeding No   Social History Narrative    3-4 x weekly    [employer]       Allergies:  Patient has no known allergies.    Medications:    Current Outpatient Medications:      cholecalciferol, vitamin D3, (VITAMIN D3) 25 mcg (1,000 unit) capsule, Take 2 capsules (2,000 Units total) by mouth once daily., Disp: 180 capsule, Rfl: 3    ferrous sulfate (FEOSOL) 325 mg (65 mg iron) Tab tablet, Take 1 tablet (325 mg total) by mouth 3 (three) times a week., Disp: 36 tablet, Rfl: 3    norgestimate-ethinyl estradioL (TRI-SPRINTEC, 28,) 0.18/0.215/0.25 mg-35 mcg (28) tablet, Take 1 tablet by mouth once daily., Disp: 84 tablet, Rfl: 4    LORazepam (ATIVAN) 0.5 MG tablet, Take 1 tablet (0.5 mg total) by mouth daily as needed for Anxiety., Disp: 30 tablet, Rfl: 0    Review of Systems   Constitutional: Negative.    HENT: Negative.     Eyes: Negative.    Respiratory: Negative.     Endocrine: Negative.    Genitourinary:  Positive for flank pain.   Allergic/Immunologic: Negative.    Neurological: Negative.    Hematological: Negative.    Psychiatric/Behavioral: Negative.       Objective:      Physical Exam  Constitutional:       Appearance: She is well-developed.   HENT:      Head: Normocephalic.   Cardiovascular:      Rate and Rhythm: Normal rate.   Pulmonary:      Effort: Pulmonary effort is normal.   Abdominal:      Palpations: Abdomen is soft.   Musculoskeletal:         General: Normal range of motion.   Skin:     General: Skin is warm.   Neurological:      Mental Status: She is alert.       Assessment:       1. Caliectasis    2. Renal stone: R non obstructing 2/22    3. Hydronephrosis, unspecified hydronephrosis type    4. Renal calculus          Plan:       Sofi was seen today for nephrolithiasis.    Diagnoses and all orders for this visit:    Caliectasis    Renal stone: R non obstructing 2/22  -     Ambulatory referral/consult to Urology    Hydronephrosis, unspecified hydronephrosis type  -     NM Renal Scan with LASIX; Future    Renal calculus  -     X-Ray Abdomen AP 1 View; Future

## 2022-10-31 ENCOUNTER — PATIENT MESSAGE (OUTPATIENT)
Dept: UROLOGY | Facility: CLINIC | Age: 37
End: 2022-10-31
Payer: COMMERCIAL

## 2022-11-01 ENCOUNTER — PATIENT MESSAGE (OUTPATIENT)
Dept: UROLOGY | Facility: CLINIC | Age: 37
End: 2022-11-01
Payer: COMMERCIAL

## 2022-11-01 ENCOUNTER — HOSPITAL ENCOUNTER (OUTPATIENT)
Dept: RADIOLOGY | Facility: HOSPITAL | Age: 37
Discharge: HOME | End: 2022-11-01
Attending: UROLOGY
Payer: COMMERCIAL

## 2022-11-01 DIAGNOSIS — N13.30 HYDRONEPHROSIS, UNSPECIFIED HYDRONEPHROSIS TYPE: ICD-10-CM

## 2022-11-01 PROCEDURE — 78708 K FLOW/FUNCT IMAGE W/DRUG: CPT | Mod: TC

## 2022-11-01 PROCEDURE — 78708 NM KIDNEY W FLOW AND FUNCTION W PHARMACOLOGICAL: ICD-10-PCS | Mod: 26,,, | Performed by: RADIOLOGY

## 2022-11-01 PROCEDURE — 78708 K FLOW/FUNCT IMAGE W/DRUG: CPT | Mod: 26,,, | Performed by: RADIOLOGY

## 2022-11-01 PROCEDURE — 63600175 PHARM REV CODE 636 W HCPCS: Performed by: UROLOGY

## 2022-11-01 RX ORDER — FUROSEMIDE 10 MG/ML
40 INJECTION INTRAMUSCULAR; INTRAVENOUS ONCE
Status: COMPLETED | OUTPATIENT
Start: 2022-11-01 | End: 2022-11-01

## 2022-11-01 RX ADMIN — FUROSEMIDE 40 MG: 10 INJECTION, SOLUTION INTRAMUSCULAR; INTRAVENOUS at 10:11

## 2022-11-02 ENCOUNTER — TELEPHONE (OUTPATIENT)
Dept: UROLOGY | Facility: CLINIC | Age: 37
End: 2022-11-02
Payer: COMMERCIAL

## 2022-11-02 ENCOUNTER — PATIENT MESSAGE (OUTPATIENT)
Dept: UROLOGY | Facility: CLINIC | Age: 37
End: 2022-11-02
Payer: COMMERCIAL

## 2022-11-02 NOTE — TELEPHONE ENCOUNTER
----- Message from Harshil Ochoa Jr., MD sent at 11/2/2022  1:00 PM CDT -----  Regarding: RE: lasix renal scan  The renal scan shows no obstruction and her kidneys work fine  ----- Message -----  From: Arianne Armstrong LPN  Sent: 11/2/2022   8:03 AM CDT  To: Harshil Ochoa Jr., MD  Subject: lasix renal scan                                 Pt is asking for call or explanation/interpretation of the lasix renal scan.   Please adviseArianne

## 2022-11-03 ENCOUNTER — LAB VISIT (OUTPATIENT)
Dept: LAB | Facility: HOSPITAL | Age: 37
End: 2022-11-03
Attending: INTERNAL MEDICINE
Payer: COMMERCIAL

## 2022-11-03 ENCOUNTER — OFFICE VISIT (OUTPATIENT)
Dept: RHEUMATOLOGY | Facility: CLINIC | Age: 37
End: 2022-11-03
Payer: COMMERCIAL

## 2022-11-03 VITALS
WEIGHT: 153 LBS | SYSTOLIC BLOOD PRESSURE: 104 MMHG | HEIGHT: 64 IN | DIASTOLIC BLOOD PRESSURE: 72 MMHG | HEART RATE: 75 BPM | BODY MASS INDEX: 26.12 KG/M2

## 2022-11-03 DIAGNOSIS — M79.10 MYALGIA: Primary | ICD-10-CM

## 2022-11-03 DIAGNOSIS — R76.8 POSITIVE ANA (ANTINUCLEAR ANTIBODY): ICD-10-CM

## 2022-11-03 DIAGNOSIS — M79.10 MYALGIA: ICD-10-CM

## 2022-11-03 LAB — CK SERPL-CCNC: 39 U/L (ref 20–180)

## 2022-11-03 PROCEDURE — 1159F MED LIST DOCD IN RCRD: CPT | Mod: CPTII,S$GLB,, | Performed by: INTERNAL MEDICINE

## 2022-11-03 PROCEDURE — 3044F PR MOST RECENT HEMOGLOBIN A1C LEVEL <7.0%: ICD-10-PCS | Mod: CPTII,S$GLB,, | Performed by: INTERNAL MEDICINE

## 2022-11-03 PROCEDURE — 1160F PR REVIEW ALL MEDS BY PRESCRIBER/CLIN PHARMACIST DOCUMENTED: ICD-10-PCS | Mod: CPTII,S$GLB,, | Performed by: INTERNAL MEDICINE

## 2022-11-03 PROCEDURE — 3008F PR BODY MASS INDEX (BMI) DOCUMENTED: ICD-10-PCS | Mod: CPTII,S$GLB,, | Performed by: INTERNAL MEDICINE

## 2022-11-03 PROCEDURE — 82085 ASSAY OF ALDOLASE: CPT | Performed by: INTERNAL MEDICINE

## 2022-11-03 PROCEDURE — 3044F HG A1C LEVEL LT 7.0%: CPT | Mod: CPTII,S$GLB,, | Performed by: INTERNAL MEDICINE

## 2022-11-03 PROCEDURE — 3078F PR MOST RECENT DIASTOLIC BLOOD PRESSURE < 80 MM HG: ICD-10-PCS | Mod: CPTII,S$GLB,, | Performed by: INTERNAL MEDICINE

## 2022-11-03 PROCEDURE — 3074F SYST BP LT 130 MM HG: CPT | Mod: CPTII,S$GLB,, | Performed by: INTERNAL MEDICINE

## 2022-11-03 PROCEDURE — 99999 PR PBB SHADOW E&M-EST. PATIENT-LVL IV: ICD-10-PCS | Mod: PBBFAC,,, | Performed by: INTERNAL MEDICINE

## 2022-11-03 PROCEDURE — 36415 COLL VENOUS BLD VENIPUNCTURE: CPT | Performed by: INTERNAL MEDICINE

## 2022-11-03 PROCEDURE — 99204 OFFICE O/P NEW MOD 45 MIN: CPT | Mod: S$GLB,,, | Performed by: INTERNAL MEDICINE

## 2022-11-03 PROCEDURE — 99999 PR PBB SHADOW E&M-EST. PATIENT-LVL IV: CPT | Mod: PBBFAC,,, | Performed by: INTERNAL MEDICINE

## 2022-11-03 PROCEDURE — 3008F BODY MASS INDEX DOCD: CPT | Mod: CPTII,S$GLB,, | Performed by: INTERNAL MEDICINE

## 2022-11-03 PROCEDURE — 3074F PR MOST RECENT SYSTOLIC BLOOD PRESSURE < 130 MM HG: ICD-10-PCS | Mod: CPTII,S$GLB,, | Performed by: INTERNAL MEDICINE

## 2022-11-03 PROCEDURE — 3078F DIAST BP <80 MM HG: CPT | Mod: CPTII,S$GLB,, | Performed by: INTERNAL MEDICINE

## 2022-11-03 PROCEDURE — 82550 ASSAY OF CK (CPK): CPT | Performed by: INTERNAL MEDICINE

## 2022-11-03 PROCEDURE — 1159F PR MEDICATION LIST DOCUMENTED IN MEDICAL RECORD: ICD-10-PCS | Mod: CPTII,S$GLB,, | Performed by: INTERNAL MEDICINE

## 2022-11-03 PROCEDURE — 99204 PR OFFICE/OUTPT VISIT, NEW, LEVL IV, 45-59 MIN: ICD-10-PCS | Mod: S$GLB,,, | Performed by: INTERNAL MEDICINE

## 2022-11-03 PROCEDURE — 1160F RVW MEDS BY RX/DR IN RCRD: CPT | Mod: CPTII,S$GLB,, | Performed by: INTERNAL MEDICINE

## 2022-11-03 ASSESSMENT — ROUTINE ASSESSMENT OF PATIENT INDEX DATA (RAPID3)
PSYCHOLOGICAL DISTRESS SCORE: 3.3
PATIENT GLOBAL ASSESSMENT SCORE: 6
FATIGUE SCORE: 6
MDHAQ FUNCTION SCORE: 0.6
AM STIFFNESS SCORE: 1, YES
TOTAL RAPID3 SCORE: 4.17
PAIN SCORE: 4.5
WHEN YOU AWAKENED IN THE MORNING OVER THE LAST WEEK, PLEASE INDICATE THE AMOUNT OF TIME IT TAKES UNTIL YOU ARE AS LIMBER AS YOU WILL BE FOR THE DAY: 10 MIN

## 2022-11-04 LAB — ALDOLASE SERPL-CCNC: 2.3 U/L (ref 1.2–7.6)

## 2022-11-04 NOTE — PROGRESS NOTES
History of present illness:  37-year-old female comes in because of diffuse aching.  It started 2 weeks ago.  She would had a similar episode in 2019 after a flu shot.  That episode lasted about 3 months.  She may have had some muscle weakness at that time as well.  She gets occasional aching since then but it has not persisted this long.  She is not received another flu shot since 2019.  Her COVID vaccine was over a year ago.  She had no antecedent URI.  She was under stress at the time.  The pain was of gradual onset.  She had no swelling.  The pain is bad in the morning.  She has 10 minutes of morning stiffness.  It also bothers her at the end of the day.  She has had no nocturnal pain.  She does admit to not sleeping well.  She is had some fatigue.    Tylenol did not help.  She is not tried Advil or Aleve.  Massage helped.  Heat helps.  She is not tried topical medications.  It does interfere with activity.    She is had no fevers but complains of some rigors initially.  She complains of frontal headache.  She is had no rash, conjunctivitis, oral ulcers.  She has dryness of her eyes but not her mouth.  She has no Raynaud's phenomena or pleurisy.  She is had alternating diarrhea and constipation for the past month.  She has no vaginal discharge or ulcers.  She has paresthesias in the right hand the left foot.  She has no thrombophlebitis.  She is a  1 para 1.  Her mother had osteoarthritis.      Systems review:  General: Weight has been stable   GI:  She is had isolated pain in the left lower quadrant.  She was evaluated by urology and gynecology with a negative workup.  She has no liver problems.    :  No kidney or bladder problems     Physical examination:  Skin:  No rashes  ENT:  Adequate tears and saliva.  No conjunctivitis or oral ulcers.    Chest:  Clear to auscultation   Cardiac:  No murmurs, gallops, rubs   Abdomen: She has some tenderness in the left lower quadrant.  No rebound or guarding.  No  organomegaly or masses.    Extremities:  No pedal edema   Musculoskeletal: She has some pain on range of motion lumbar spine.  She has no soft tissue swelling, erythema, or increased warmth.  She has full range of motion of all peripheral joints without pain on range of motion.  She has no tender areas to palpation.    Neurologic:  Normal muscle strength testing  Laboratory:  CHRIS positive 1:80, homogeneous pattern with a negative CHRIS panel.  She had a low B12 level.  All other laboratory studies were normal.    Assessment:  1.  Diffuse myalgias.  I suspect this is related to her underlying stress.  She does not really meet criteria for fibromyalgia.  2. Positive CHRIS in a low titer.  She has no signs or symptoms suggestive of an underlying connective tissue disease.  The positive CHRIS may be related to her low B12 level or it may be a false-positive test.      Plans:  1.  I discussed my findings with her.  2. I recommend yoga or [employer]  3.  She may try topical medications  4.  She may take Advil or Aleve as needed.  5. I did not give her regular return appointment but would be happy to see her in follow-up if necessary.

## 2022-12-21 ENCOUNTER — LAB VISIT (OUTPATIENT)
Dept: LAB | Facility: HOSPITAL | Age: 37
End: 2022-12-21
Attending: INTERNAL MEDICINE
Payer: COMMERCIAL

## 2022-12-21 DIAGNOSIS — E53.8 B12 DEFICIENCY: ICD-10-CM

## 2022-12-21 PROCEDURE — 82607 VITAMIN B-12: CPT | Mod: 91 | Performed by: INTERNAL MEDICINE

## 2022-12-21 PROCEDURE — 36415 COLL VENOUS BLD VENIPUNCTURE: CPT | Mod: PO | Performed by: INTERNAL MEDICINE

## 2022-12-21 PROCEDURE — 86340 INTRINSIC FACTOR ANTIBODY: CPT | Performed by: INTERNAL MEDICINE

## 2022-12-21 PROCEDURE — 86256 FLUORESCENT ANTIBODY TITER: CPT | Performed by: INTERNAL MEDICINE

## 2022-12-21 PROCEDURE — 82607 VITAMIN B-12: CPT | Performed by: INTERNAL MEDICINE

## 2022-12-21 PROCEDURE — 83921 ORGANIC ACID SINGLE QUANT: CPT | Performed by: INTERNAL MEDICINE

## 2022-12-22 LAB
PCA AB TITR SER IF: NORMAL {TITER}
VIT B12 SERPL-MCNC: 241 PG/ML (ref 210–950)

## 2022-12-23 LAB
ANNOTATION COMMENT IMP: NORMAL
IF BLOCK AB SER QL: NEGATIVE

## 2022-12-24 LAB — VIT B12 SERPL-MCNC: 185 NG/L (ref 180–914)

## 2022-12-27 ENCOUNTER — TELEPHONE (OUTPATIENT)
Dept: INTERNAL MEDICINE | Facility: CLINIC | Age: 37
End: 2022-12-27
Payer: COMMERCIAL

## 2022-12-27 RX ORDER — CYANOCOBALAMIN 1000 UG/ML
1000 INJECTION, SOLUTION INTRAMUSCULAR; SUBCUTANEOUS
Status: DISCONTINUED | OUTPATIENT
Start: 2022-12-27 | End: 2023-10-24

## 2022-12-27 NOTE — TELEPHONE ENCOUNTER
----- Message from Angelica Marin MD sent at 12/27/2022 11:39 AM CST -----  Please schedule her for B12 shots twice monthly, orders in, thank you

## 2022-12-28 ENCOUNTER — CLINICAL SUPPORT (OUTPATIENT)
Dept: INTERNAL MEDICINE | Facility: CLINIC | Age: 37
End: 2022-12-28
Payer: COMMERCIAL

## 2022-12-28 ENCOUNTER — TELEPHONE (OUTPATIENT)
Dept: INTERNAL MEDICINE | Facility: CLINIC | Age: 37
End: 2022-12-28

## 2022-12-28 ENCOUNTER — NUTRITION (OUTPATIENT)
Dept: NUTRITION | Facility: CLINIC | Age: 37
End: 2022-12-28

## 2022-12-28 DIAGNOSIS — E53.8 B12 DEFICIENCY: Primary | ICD-10-CM

## 2022-12-28 DIAGNOSIS — Z71.3 NUTRITIONAL COUNSELING: Primary | ICD-10-CM

## 2022-12-28 PROCEDURE — S9470 NUTRITIONAL COUNSELING, DIET: HCPCS | Mod: CSM,S$GLB,, | Performed by: DIETITIAN, REGISTERED

## 2022-12-28 PROCEDURE — 99999 PR PBB SHADOW E&M-EST. PATIENT-LVL II: CPT | Mod: PBBFAC,,, | Performed by: DIETITIAN, REGISTERED

## 2022-12-28 PROCEDURE — S9470 PR NUTRITIONAL COUNSELING, DIETITIAN 12 WEEK PACKAGE: ICD-10-PCS | Mod: CSM,S$GLB,, | Performed by: DIETITIAN, REGISTERED

## 2022-12-28 PROCEDURE — 96372 THER/PROPH/DIAG INJ SC/IM: CPT | Mod: S$GLB,,, | Performed by: INTERNAL MEDICINE

## 2022-12-28 PROCEDURE — 99999 PR PBB SHADOW E&M-EST. PATIENT-LVL II: ICD-10-PCS | Mod: PBBFAC,,, | Performed by: DIETITIAN, REGISTERED

## 2022-12-28 PROCEDURE — 96372 PR INJECTION,THERAP/PROPH/DIAG2ST, IM OR SUBCUT: ICD-10-PCS | Mod: S$GLB,,, | Performed by: INTERNAL MEDICINE

## 2022-12-28 PROCEDURE — 99999 PR PBB SHADOW E&M-EST. PATIENT-LVL I: CPT | Mod: PBBFAC,,,

## 2022-12-28 PROCEDURE — 99999 PR PBB SHADOW E&M-EST. PATIENT-LVL I: ICD-10-PCS | Mod: PBBFAC,,,

## 2022-12-28 RX ORDER — LANOLIN ALCOHOL/MO/W.PET/CERES
1000 CREAM (GRAM) TOPICAL DAILY
Qty: 30 TABLET | Refills: 12 | Status: SHIPPED | OUTPATIENT
Start: 2022-12-28 | End: 2023-10-24 | Stop reason: SDUPTHER

## 2022-12-28 RX ADMIN — CYANOCOBALAMIN 1000 MCG: 1000 INJECTION, SOLUTION INTRAMUSCULAR; SUBCUTANEOUS at 01:12

## 2022-12-28 NOTE — TELEPHONE ENCOUNTER
In addition to twice monthly B12, she should be taking over-the-counter cyanocobalamin 1000 mcg every single day.  Repeat labs in 2 months, orders in, thank you

## 2022-12-28 NOTE — PROGRESS NOTES
"Nutrition Assessment for Initial Medical Nutrition Therapy-- 12 week protocol $575      Reason for MNT visit: Pt in for education and nutrition counseling regarding  longevity, desired weight loss, feel better .       ASSESSMENT    Age: 37 y.o.  Wt: 148.1 lbs (86.9 lbs lean mass, 46.7 lbs skeletal muscle mass, 61.2 lbs fat mass, visceral fat level 14)  REE: 1300  Wt Readings from Last 1 Encounters:   01/03/23 67.2 kg (148 lb 1.6 oz)     Ht:   Ht Readings from Last 1 Encounters:   11/03/22 5' 4" (1.626 m)     BMI:   BMI Readings from Last 1 Encounters:   01/03/23 25.42 kg/m²       Clinical signs/symptoms: weight gain over 1.5 years, high cholesterol    Medical History:   Past Medical History:   Diagnosis Date    Abnormal cervical Papanicolaou smear 2011, 11-19-12    Colposcopy , HONEY 1    Anxiety     Childhood asthma     Cholelithiasis complicating pregnancy, antepartum      Problem List             Resolved    Cholelithiasis: seen on u/s 2014         Anxiety and depression         Acne         Iron deficiency         Vitamin D insufficiency         Mixed hyperlipidemia         Renal stone: R non obstructing 2/22         B12 deficiency            Medications:   Current Outpatient Medications:     cholecalciferol, vitamin D3, (VITAMIN D3) 25 mcg (1,000 unit) capsule, Take 2 capsules (2,000 Units total) by mouth once daily. (Patient not taking: Reported on 11/3/2022), Disp: 180 capsule, Rfl: 3    cyanocobalamin (VITAMIN B-12) 1000 MCG tablet, Take 1 tablet (1,000 mcg total) by mouth once daily., Disp: 30 tablet, Rfl: 12    ferrous sulfate (FEOSOL) 325 mg (65 mg iron) Tab tablet, Take 1 tablet (325 mg total) by mouth 3 (three) times a week. (Patient not taking: Reported on 11/3/2022), Disp: 36 tablet, Rfl: 3    LORazepam (ATIVAN) 0.5 MG tablet, Take 1 tablet (0.5 mg total) by mouth daily as needed for Anxiety., Disp: 30 tablet, Rfl: 0    norgestimate-ethinyl estradioL (TRI-SPRINTEC, 28,) 0.18/0.215/0.25 mg-35 mcg (28) " tablet, Take 1 tablet by mouth once daily., Disp: 84 tablet, Rfl: 4    Current Facility-Administered Medications:     cyanocobalamin injection 1,000 mcg, 1,000 mcg, Intramuscular, Q14 Days, Angelica Marin MD, 1,000 mcg at 12/28/22 1340    Supplements: B12 supplements and B12 injections    Food allergies  intolerances: NKFA, her son does have tree nuts and soy allergy    Labs:  Reviewed  Hemoglobin A1C   Date Value Ref Range Status   02/17/2022 4.9 4.0 - 5.6 % Final     Comment:     ADA Screening Guidelines:  5.7-6.4%  Consistent with prediabetes  >or=6.5%  Consistent with diabetes    High levels of fetal hemoglobin interfere with the HbA1C  assay. Heterozygous hemoglobin variants (HbS, HgC, etc)do  not significantly interfere with this assay.   However, presence of multiple variants may affect accuracy.     12/13/2017 4.8 4.0 - 5.6 % Final     Comment:     According to ADA guidelines, hemoglobin A1c <7.0% represents  optimal control in non-pregnant diabetic patients. Different  metrics may apply to specific patient populations.   Standards of Medical Care in Diabetes-2016.  For the purpose of screening for the presence of diabetes:  <5.7%     Consistent with the absence of diabetes  5.7-6.4%  Consistent with increasing risk for diabetes   (prediabetes)  >or=6.5%  Consistent with diabetes  Currently, no consensus exists for use of hemoglobin A1c  for diagnosis of diabetes for children.  This Hemoglobin A1c assay has significant interference with fetal   hemoglobin   (HbF). The results are invalid for patients with abnormal amounts of   HbF,   including those with known Hereditary Persistence   of Fetal Hemoglobin. Heterozygous hemoglobin variants (HbAS, HbAC,   HbAD, HbAE, HbA2) do not significantly interfere with this assay;   however, presence of multiple variants in a sample may impact the %   interference.       Cholesterol   Date Value Ref Range Status   02/17/2022 239 (H) 120 - 199 mg/dL Final     Comment:      The National Cholesterol Education Program (NCEP) has set the  following guidelines (reference ranges) for Cholesterol:  Optimal.....................<200 mg/dL  Borderline High.............200-239 mg/dL  High........................> or = 240 mg/dL     09/24/2019 219 (H) 120 - 199 mg/dL Final     Comment:     The National Cholesterol Education Program (NCEP) has set the  following guidelines (reference ranges) for Cholesterol:  Optimal.....................<200 mg/dL  Borderline High.............200-239 mg/dL  High........................> or = 240 mg/dL     01/26/2018 188 <200 mg/dL Final     Triglycerides   Date Value Ref Range Status   02/17/2022 143 30 - 150 mg/dL Final     Comment:     The National Cholesterol Education Program (NCEP) has set the  following guidelines (reference values) for triglycerides:  Normal......................<150 mg/dL  Borderline High.............150-199 mg/dL  High........................200-499 mg/dL     09/24/2019 68 30 - 150 mg/dL Final     Comment:     The National Cholesterol Education Program (NCEP) has set the  following guidelines (reference values) for triglycerides:  Normal......................<150 mg/dL  Borderline High.............150-199 mg/dL  High........................200-499 mg/dL     01/26/2018 50 <150 mg/dL Final     HDL   Date Value Ref Range Status   02/17/2022 75 40 - 75 mg/dL Final     Comment:     The National Cholesterol Education Program (NCEP) has set the  following guidelines (reference values) for HDL Cholesterol:  Low...............<40 mg/dL  Optimal...........>60 mg/dL     09/24/2019 90 (H) 40 - 75 mg/dL Final     Comment:     The National Cholesterol Education Program (NCEP) has set the  following guidelines (reference values) for HDL Cholesterol:  Low...............<40 mg/dL  Optimal...........>60 mg/dL     01/26/2018 83 >50 mg/dL Final     LDL Cholesterol   Date Value Ref Range Status   02/17/2022 135.4 63.0 - 159.0 mg/dL Final     Comment:      The National Cholesterol Education Program (NCEP) has set the  following guidelines (reference values) for LDL Cholesterol:  Optimal.......................<130 mg/dL  Borderline High...............130-159 mg/dL  High..........................160-189 mg/dL  Very High.....................>190 mg/dL     09/24/2019 115.4 63.0 - 159.0 mg/dL Final     Comment:     The National Cholesterol Education Program (NCEP) has set the  following guidelines (reference values) for LDL Cholesterol:  Optimal.......................<130 mg/dL  Borderline High...............130-159 mg/dL  High..........................160-189 mg/dL  Very High.....................>190 mg/dL     01/26/2018 92 mg/dL (calc) Final     Comment:     Reference range: <100     Desirable range <100 mg/dL for patients with CHD or  diabetes and <70 mg/dL for diabetic patients with  known heart disease.     LDL-C is now calculated using the Rashi-Nallely   calculation, which is a validated novel method providing   better accuracy than the Friedewald equation in the   estimation of LDL-C.   Rashi FERNANDEZ et al. NELSY. 2013;310(19): 1662-0838   (http://education.[employer].com/faq/GLY580)       HDL/Cholesterol Ratio   Date Value Ref Range Status   02/17/2022 31.4 20.0 - 50.0 % Final   09/24/2019 41.1 20.0 - 50.0 % Final   01/26/2018 2.3 <5.0 (calc) Final     Non-HDL Cholesterol   Date Value Ref Range Status   02/17/2022 164 mg/dL Final     Comment:     Risk category and Non-HDL cholesterol goals:  Coronary heart disease (CHD)or equivalent (10-year risk of CHD >20%):  Non-HDL cholesterol goal     <130 mg/dL  Two or more CHD risk factors and 10-year risk of CHD <= 20%:  Non-HDL cholesterol goal     <160 mg/dL  0 to 1 CHD risk factor:  Non-HDL cholesterol goal     <190 mg/dL     09/24/2019 129 mg/dL Final     Comment:     Risk category and Non-HDL cholesterol goals:  Coronary heart disease (CHD)or equivalent (10-year risk of CHD >20%):  Non-HDL cholesterol goal     <130  mg/dL  Two or more CHD risk factors and 10-year risk of CHD <= 20%:  Non-HDL cholesterol goal     <160 mg/dL  0 to 1 CHD risk factor:  Non-HDL cholesterol goal     <190 mg/dL     01/05/2017 109 mg/dL Final     Comment:     Risk category and Non-HDL cholesterol goals:  Coronary heart disease (CHD)or equivalent (10-year risk of CHD >20%):  Non-HDL cholesterol goal     <130 mg/dL  Two or more CHD risk factors and 10-year risk of CHD <= 20%:  Non-HDL cholesterol goal     <160 mg/dL  0 to 1 CHD risk factor:  Non-HDL cholesterol goal     <190 mg/dL       Non HDL Chol. (LDL+VLDL)   Date Value Ref Range Status   01/26/2018 105 <130 mg/dL (calc) Final     Comment:     For patients with diabetes plus 1 major ASCVD risk   factor, treating to a non-HDL-C goal of <100 mg/dL   (LDL-C of <70 mg/dL) is considered a therapeutic   option.       Vitamin B-12   Date Value Ref Range Status   12/21/2022 241 210 - 950 pg/mL Final   12/21/2022 185 180 - 914 ng/L Final     Comment:     B-12 <400; MMA test was performed.    Test Performed by:  [facility]  [address] [place] [address]  : Moises Lamas M.D. Ph.D.; CLIA# [id number]     10/19/2022 198 (L) 210 - 950 pg/mL Final     TSH   Date Value Ref Range Status   10/19/2022 1.525 0.400 - 4.000 uIU/mL Final         Typical day recall: Reviewed and noted during consultation. Pt reported practicing intermittent fasting most days. She chooses nutrient dense foods. Pt reports that lately she may be over eating or emotional eating and eating higher quantities. Discussed finding balance on her plate and noticing fullness cues.    Beverages: Nespresso and almond milk, 3 bottles of water    Dining out: Regular, 4-6 times per week    Alcohol: nonsmoker, PT reports drinking 1-2 drinks every other weekend    Lifestyle Influences  Support System: Self, family, 7 year old son, pt  passed away in 2016 to cancer    Meal  preparation/shopping: self    Current Activity Level: 3 days a week low impact on treadmill, open to working with a .    Patient motivation, anticipated barriers, expected compliance: Patient is motivated and has verbalized understanding and intent to comply    DIAGNOSIS   Problem: Overweight  Etiology: RT increase caloric intake  Signs/Symptoms: AEB food recall, BMI >25    INTERVENTION  Nutrition prescription: estimated energy requirements:   Calories: 1400  Protein: 105-120 g  Carbohydrates: 120-135 g  Focus on plant-based, heart healthy fats  Total Fat: 45-55 g  Baseline for fluids: 75 fl ounces + sweat loss    Recommendations & Goals:  Patient goals and recommendations are tailored to the specific patient's needs, readiness to change, lifestyle, culture, skills, resources, & abilities. Strategies to help achieve these nutrition-related goals were discussed which can include but are not limited to SMART goal setting & mindful eating.     Aim for a minimum of 7 hours sleep   Exercise 60 minutes most days  Eat breakfast within 1-2 hours of waking up  Try not to skip any meals or snacks, not going more than 3-4 hours without eating.   At each meal and snack, try to include a source of fiber + lean protein + healthy fat.     Written Materials Provided  These resources are intended to assist the patient in making it easier to choose recommended options when eating out & to identify better-for-you brands at the grocery store:    Meal Planning Guide with recommendations discussed along with portion sizes and a customized meal plan   Eat Fit pilo card as a reminder to download the pilo to ones smart phone which provides: current Eat Fit partners, approved menu options, food labels for carb counting, & recipes   On-the-Go Food Guide  Brand Specific Eat Fit Grocery Product list   RD contact information- for patient to contact regarding any questions, needs, and/or concerns that may arise     MONITORING &  EVALUATION    Communicated with healthcare provider    Documented plan for referral to appropriate agency/healthcare provider as needed    Comprehension: good     Motivation to change: high    Follow-up: Yes, in 4 weeks    Counseling time: 2 Hours

## 2022-12-28 NOTE — PROGRESS NOTES
2 pt identifiers used, pt here for B12 nurse visit. Tolerated injection well. Pt had questions for MD Marin that were answered. Pt second monthly nurse visit scheduled.

## 2022-12-29 LAB — METHYLMALONATE SERPL-SCNC: 0.09 NMOL/ML

## 2022-12-29 NOTE — TELEPHONE ENCOUNTER
Please make sure she needs to take the oral medication of the B12 and please make sure that labs are scheduled in 2 months, thank you

## 2022-12-29 NOTE — TELEPHONE ENCOUNTER
Called the pt to discuss test results and recommendations. I left the pt a VM asking for a call back.    [employer] message sent  Labs scheduled

## 2023-01-03 VITALS — BODY MASS INDEX: 25.42 KG/M2 | WEIGHT: 148.13 LBS

## 2023-01-03 NOTE — PATIENT INSTRUCTIONS
Name:  Sofi Vidal  Date:  12/28/2022      Recommended Daily Energy Requirements:   1400 calories   105-120 grams  protein   120-135 grams carbohydrates   45-55 grams fat  Focus on plant-based fats  75 fluid oz per day    No more than 20 grams added sugar per day      Nutrition Tips & Goals:     Aim to eat or drink within 1-2 hours of waking; especially to contain a protein source  Frequent fueling: Aim for small meal or snack every 3 or so hours   Metabolism, energy, curb cravings  Emphasis on lean protein + fiber-rich carb (veg, fruit or whole grain) + plant-based fat   If/when choose grains and starches, choose 100% whole grains + fiber-rich starches such as legumes, quinoa, whole wheat pasta, sweet potatoes, 100% whole grain bread, etc.   Nutrition bars + snacks: Look for products with <7 grams added sugar and 7+ grams protein (Think Valente #7 for aisle products)    Note: One serving (15-20 grams carbs)  =  1 cup fruit, cow's milk or plain yogurt,   ½ cup cooked grains, ½ cup starchy veggies (corn, peas, potatoes), 1 slice bread      Hydration: Drink at least ½ of your body weight in ounces of fluids daily + addition 16-24 ounces per pound of sweat lost via exercise.  [BlueVine pilo for water reminder]  Exercise: Aim for 60 minutes most days or aim to obtain 10,000 steps per day throughout work day - ideally starting day with 30-45 minutes of exercise  Aim for a minimum of 7-8 hours sleep nightly  Keep a daily food record       Restaurant Tips:        Pick 2 out of the 5 Rule:  Instead of eating bread (or tortilla chips), an appetizer, alcoholic drink and dessert, choose just one to have with your entrée  Focus on lean proteins: Refer to lean protein page in meal plan guidebook      Select items grilled, baked, broiled, braised, poached or roasted      Avoid crispy, crunchy, breaded, paneed or stuffed items      Avoid items that are cream based, au gratin or buttered      Order sauces,  dressings and gravies on the side. This way you can add 1-2 Tbsp yourself  instead of the dish being 'drowned' in the sauce  = portion control + saving calories while still enjoying the dish      Watch out for high calorie salad additives à   A better-for-you salad consists of unlimited non-starchy veggies, lean protein and 2-3 salad additions, each additive being ~2 Tbsp (See below in notes for examples)       Hold the starchy side dish - request extra non-starchy vegetables instead      Order vegetables steamed or stir-fried instead of sautéed to avoid excess oils. Ask for olive oil on the side and drizzle over veggies instead      Don't drink your calories: avoid sugary soft drinks, juices and mixers  Note: even 100% fruit juice contains the same sugar as a soft drink     [facility] Eat Fit YOL[facility] à Designed to take the guesswork out of dining out healthfully, to make the healthy choice the easy choice  www.eatfitnola.com  Eat Fit Cookbook  [facility] Eat Fit pilo à Free pilo for Figments  [employer] and [employer] offer Eat Fit options  Provides a list of all Eat Fit restaurants & dishes by location  Lists what dishes are Eat Fit at each restaurant  Lists the nutrition facts for each Eat Fit dish  Provides 200 + Eat Fit approved recipes  Grocery shopping guides + community wellness resources    -Salad additives: Pick 2-3, each being ~2 Tbsp:  Dressing  Cheese  Nuts  Santillan  Dried fruit  Avocado  Guacamole  Eggs    How to make a healthy smoothie:  Choose a protein source:  At least 6 oz Plain Greek yogurt or 2% cottage cheese  Examples of plant-based protein powders:  Keri Deega  Garden of Life RAW  100% whey protein powder  2 scoops Vital Proteins Collagen Peptides  Add a piece of fruit -or- 1 cup chopped fresh or frozen unsweetened fruit  Add any non-starchy veggies  Choose a fat source (1-2 Tbsp)  Nut Butter (except Nutella)  ½ Avocado   Avocado oil   Extra Virgin Olive Oil  Choose a liquid:  Unsweetened  almond milk  hemp milk  cashew milk  coconut milk  [facility] lactose free milk (skim, 1% or 2%) -or- Organic Valley ULTRA reduced fat milk (lactose free)  Water   Healthy add-ins for extra nutritional boost:  1-2 Tbsp Flaxseeds or Sebastian seeds  Add ice and blend!   To step it up a notch, use frozen PLAIN cauliflower florets in place of ice to provide more nutrients    What may cause inflammation/flare ups in our body/decrease our Energy?  White/refined carbohydrates  Sugar  Fried food  Alcohol      Sample Meal Plan:    Breakfast: 1-2 servings of carbs = 30-40 grams + at least 15 grams lean protein/heart healthy fat  1 slice 100% whole grain bread (Ex: Sagar)+ ½  small avocado + 1 egg  1 slice 100% whole grain bread + 1-2 Tbsp PB/nut butter  100% whole wheat English muffin + 2 eggs with a sprinkle of cheese on top   ½ to 1 cup plain cooked oatmeal + 1-2 Tbsp PB stirred in + 1 Tbsp flaxseed  1 packet weight control oatmeal + ½ -1 scoop protein powder  Folica's Red Mill GF Oatmeal with Flax & Sebastian (grab and go oatmeal cup)  Evol frozen breakfast sandwich  Omelet: 2 eggs + sprinkle of cheese + ¼- ½ avocado + non-starchy veggies + fruit  High protein, fiber rich cereals: Nutritious living hi-lo, kasha go lean, Natures path optimum, special K protein    Snack: 1 serving of carbs = 15-20 grams + at least 10-15 grams lean protein/heart healthy fat  Needed if going >3-4 hours between meals (See below snack for options)    Lunch: 1-2 servings of carbs =20-40 grams = ½ cup to 1 cup cooked starch + palm size thickness lean protein + non-starchy veggies  See picture below in notes as a guide  Refer to meal plan guide book for list of lean proteins, whole grain carbohydrates and non-starchy veggies      Snack: 1 serving of carbs = 15-20 grams + at least 10-15 grams lean protein/heart healthy fat  Fruit of choice (1 piece or size of a tennis ball, i.e. orange, pear, peach, etc or 1 cup melon/berries) + protein/healthy fat:  Nuts (~  ¼ cup)  Nut butter (1-2 Tablespoons)  Cheese (reduced fat, light, part-skim, 2% cheese options)  Jerky (see grocery list for recommended brands)  hard boiled egg/s (1 yolk)   Veggies + ½ cup chicken or tuna salad  Flavored Greek Yogurt (no added sugar): Nora mccormickkos triple zero, Chobani Less Sugar, Two Good,   [place] unsweetened no sugar added 10 gram protein yogurt (plant based yogurt option)  Plain Greek yogurt + Truvia or Swerve (for sweetness) + flavor ( ¾ cup fruit, 2 Tbsp granola, ¼ - ½ cup Kashi Go Lean, extracts, etc)  Protein bar (less than 7 grams added sugar; 10-20 grams protein; ~ 150-200 calories)  Nature Valley Protein Chewy Bar  Powercrunch  Oatmega  Rx  Quest  Kind PROTEIN  EPIC bar  Ready to Drink Protein Drink (less than 7 grams sugar + 20-30 grams protein)   Iconic  Premier  Orgain  1 serving of Beanito chips + 1, 100-calorie wholly guacamole packet or 1/4 cup shredded cheese  [place]: 1-2 slices 100% whole grain bread + smear of baltazar + 3 oz lean protein (refer to meal plan guide book)  [place] balance break  Any breakfast can be a snack    Dinner: Limit carbs for dinner  Aim for palm size/thickness lean protein + at least 1 cup non-starchy veggies + small amount of heart healthy fats  Kabobs, stuffed bell peppers with no rice, Cauliflower 'rice' stir lazaro  Carb swaps:  House Foods Tofu Shirataki noodles (found in produce section of grocery stores)  Spaghetti Squash  Hearts of palm 'noodles'  Cauliflower rice  Broccoli Rice  Zoodles  Beet Zoodles  Crepini mini egg white thins (protein egg wrap)                   PRODUCE  [] All fresh fruit   [] All fresh vegetables   [] All fresh herbs  [] All herb purees + pastes  [] Pre-spiralized vegetable noodles   [] Steam-In-The-Bag begetables  [] Riced cauliflower  [] Jicama sticks  [] Love Beets  all varieties  [] Wholly Guacamole  all varieties  [] Hummus  all varieties, chickpea + vegetable  [] Tofu Erica diaz    [] Tofu  all  "varieties  [] Tempeh  all varieties    PROTEIN  CHICKEN   [] Boneless, skinless breasts  [] Boneless, skinless thighs  [] Ground chicken breast, at least 93% lean  [] Chicken breast cutlet  [] Aidell's  Chicken Sausage + Chicken Meatballs    TURKEY   [] Turkey breast tenderloin   [] Ground turkey breast, at least 93% lean  [] Cassy Naturals  Turkey Sausage    BEEF  [] Tenderloin  [] Sirloin  [] Top Loin  [] Flank Steak  [] Round Steak  [] Filet  [] Lean ground beef, at least 93% lean + grass-fed preferable    PORK  [] Tenderloin  [] Pork Chop  [] Center Cut  [] Cassy Naturals  No-Sugar Santillan    BISON  [] [place]  90 - 95% lean    SEAFOOD  [] All fresh fish + seafood; locally sourced when possible  [] Smoked salmon    HEAT + EAT ENTREES   [] Lalit's Natural Foods  Chicken, Pork, Beef  [] Jasiel  "All Natural" Grilled Chicken Breast + Strips, all varieties    SAUCES SPREADS + DIPS  [] Bitchin Sauce  Original, Chipotle, Cilantro Parsons  [] Concepción's Kitchen  Tzatziki Yogurt Dip, Babaganoush, Hummus  [] Wholly Guacamole  all varieties  [] Hummus  all varieties  [] Emmett Gringo Salsa  all varieties  [] Mrs. Chucky's Salsa  all varieties  [] Stubb's All Natural BBQ Sauce  [] Primal Kitchen  [place], Ketchup, BBQ Sauce  [] Primal Kitchen Pasta Sauce  Roasted Garlic, Tomato Basil, no-dairy Vodka Sauce  [] Sal & Yolanda's  HeartSmart Pasta Sauce    DAIRY/DAIRY SUBSTITUTES/EGGS  EGGS   [] All eggs  cage-free, pasture-raise preferable  [] Crepini  egg wraps  [] Vital Farms  Pasture-Raised Egg Bites  [] JUST Egg [vegan]     CREAMERS   [] Califia  Better Half, original + vanilla unsweetened  [] NutPods  all varieties    MILK   [] Horizon Organic  all varieties except chocolate  [] Organic Valley  all varieties except chocolate  [] Organic Valley  ultra-filtered, reduced fat milk     PLANT_BASED MILK ALTERNATIVES  [] All unsweetened almond milks  original, vanilla + chocolate  [] Ripple  unsweetened "   [] Milkadamia  original +_ vanilla, unsweetened   [] Forager  original + vanilla, unsweetened   [] Silk Organic  soy milk, unsweetened  [] Oatly  unsweetened  [] Califia  regular + protein-fortified oat milk, unsweetened     CHEESES  [] Regular or reduced fat cheeses  [] BelGioso  Fresh Mozzarella Snack Packs, Parmesan Power-full Snack   [] Goat cheese  [] Fresh mozzarella  [] String cheese  all varieties  [] Kaur Cottage Cheese  [] Christiana's Cultured Cottage Cheese  [] Mercedez Life 'Just Like Mozzarella'  plant-based shreds and other varieties  [] Parmela Creamery  plant-based shredded cheese    YOGURT  [] Fage  2% low-fat, plain  [] Siggi's  plain, vanilla  [] Chobani Greek  nonfat + whole milk yogurt, plain   [] Chobani Less Sugar  all flavored varieties   [] Oikos Greek  nonfat, plain  [] Two Good  all varieties   [] [facility] Provisions  plain  [] [place] Organic  low-fat + nonfat, plain  [] [facility]  goat milk yogurt, plain  [] Kefit  unsweetened, plain  [] Forager  Greek style unsweetened, plain [dairy-free]  [] [place]  unsweetened Greek style, plain [dairy-free]  [] [facility]  almond milk yogurt, vanilla or plain, unsweetened [dairy-free]    FREEZER SECTION  FROZEN VEGGIES  [] All plain frozen veggies + greens [e.g. broccoli, brussels, carrots, okra, mushrooms, zucchini, yellow squash, butternut squash, kale, spinach, gigi greens]  [] Riced veggies [e.g. cauliflower, broccoli, butternut squash]  [] Edamame  all varieties  [] Green Giant  [] Veggie Spirals  [] Marinated Veggies [e.g. eggplant, peppers, zucchini]  [] Simply Steam Watson Sprouts  [] Birds Eye  [] Power Blend Italian Style  lentils, broccoli, kale, zucchini  []  Watson Sprouts & Carrots  [] Oven Roasters Broccoli & Cauliflower  [] [place] Blend  [] Tattooed   [] Green Bean Blend  [] Farmer's Market Ratatouille  [] Butter Balsamic Glazed Vegetables  [] Riced Cauliflower & Quinoa  Mediterranean Style  [] Patricia's Good Life  Southern Style Greens [sauteed kale + onion]    FROZEN FRUITS  [] All unsweetened frozen fruits  all varieties  [] Dole Fruits & Veggie Blends  Berries 'n Kale  [] Dole Mix-ins  Triple Berry     FROZEN ENTREES  [] The Good Kitchen meals  all varieties [ e.g. Chili Lime Chicken Over Riced Cauliflower]  [] Premium Paleo  Not Feliciano Momma's Meatloaf  [] Primal Kitchen  Chicken Pesto + Steak Fajitas w/ Peppers & Onions  [] Eating Well Frozen Entrees  Butter Chicken Masala, Steak Carne Asada, Creamy Pesto Chicken, Chicken + Wild Rice Stroganoff, Yellow Boogie Chicken, Sun-dried Tomato Chicken, Chicken Lo Mein  [] Realgood Entree Bowls  Japanese Inspired Beef Bowl over Riced Cauliflower, Chicken Burrito Bowl   [] Great Karma Coconut Boogie  [] Ninfa's  [place] with Black Beans, Vegetable Lasagna  [] Kashi May[place] Bake  [] Healthy Choice  Simply Steamers Chicken Fried Rice  [] Basil Pesto Chicken & Dutch Style Pork Power Bowls  [] Tattooed   Enchilada Bowl  [] Kevan Farms  Spicy Black Bean Burgers    FROZEN PIZZAS  [] Cauli'flour Foods  Cauliflower Pizza Crusts  [] Outer Aisle  Cauliflower Crust  [] Ninfa's  Veggie Crust Cheese Pizza  [] Quest Pizza     VEGETARIAN PRODUCTS  [] Beyond Meat  ground 'meat' + grilled 'chicken' strips  [] Tofurkey  Original Italian Sausage + Original Tempeh  [] Gardein  Beefless Ground + Meatless Meatballs  [] Kevan Farms Grillers  Original Burger, Crumbles, Meatballs    ICE CREAMS + FROZEN DESSERTS  [] Halo Top  regular + keto series, pops  [] Rebel  ice cream + dessert sandwiches  [] Enlightened  ice cream + bars  [] Nightfood  ice cream  [] Realgood  ice cream  [] Arctic Zero Fit  frozen pint  [] The Frozen Farmer  sorbets  [] Wholly Rollies  Protein Balls, all varieties  [] Dream Pops  Coconut Latte    FROZEN BREAKFASTS  [] Realgood  Breakfast Sandwiches on Cauliflower Cheesy Bread  [] Rebel  ice  cream + dessert sandwiches  [] Enlightened  ice cream + bars  [] Nightfood  ice cream  [] Realgood  ice cream  [] Arctic Zero Fit  frozen pint  [] The Frozen Farmer  sorbets  [] Wholly Rollies  Protein Balls, all varieties  [] Dream Pops  Coconut Latte    BREADS/BUNS/WRAPS  [] Sagar Bread: All Types - In Freezer Section   [] Flat Out Light Wraps - All Varieties   [] Flat Out Protein Up Carb Down Flat Bread   [] Kontos Whole Wheat Pocket [place]   [] Huseyin YOLIS 100% Whole Wheat Tortillas   [] LaTortilla Factory Tortillas - Smart & Delicious; 50 or 80-calorie   [] Nature's Own 100% Whole Wheat Bread   [] Orowheat Healthful - 100% Whole Wheat Slice Bread and [place] Thins   [] Orowheat Healthful - Whole Wheat Nuts & Grain Bread; Flax & Seed Bread   [] Pepperidge Farm Natural Whole Grain 15 Bread   [] Pepperidge Farm Natural Whole Grain English Muffin - 100% Whole Wheat   [] Pepperidge Farm Very Thin 100% Whole Wheat   [] Brittney Lopez 45 Calories and Delightful   [] Goyo' 100% Whole Wheat Thin-Sliced Bagels and English Muffins   [] Western Bagel: Perfect 10     GLUTEN FREE  [] Jase's Gluten Free Bread   [] [place] Bakehouse 7-Grain Gluten Free Bread     LEGUME PASTA   [] Explore Asian Organic Black Bean Spaghetti   [] Modern Table   [] Tolerant Foods       NUT BUTTERS & JELLIES    NUT BUTTERS   [] Better'n Chocolate: Coconut Chocolate Peanut Butter Spread   [] Better'n Peanut Butter - All Types   [] Earth Balance Coconut and Peanut Spread   [] Quinton's Nut [place]   [] MaraNatha: All Natural Roasted Cashew Butter - [place] or Creamy   [] MaraNatha: Roasted Peanut Butter   [] Nuts 'N More Peanut [place] - All Flavors   [] PB2 Powder - Original or Chocolate   [] Skippy Natural - Creamy, Super Chunk   [] Smart Balance Peanut Butter - [place] or Creamy   [] Peanut Butter & Company:   [] Smooth , Crunch Time, The Heat Is On, Old Fashioned Smooth, Mighty Nut- Powdered Peanut Butter, Squeeze Pack   [] Lexi's Natural  Peanut Butter - [place] or Creamy   [] Sunbutter Nut Butter   [] Wild Friends Protein Peanut Butter/Pilot o Butter - Vanilla or Chocolate     JELLIES  o Polaner's All Fruit   o Clearly Organic Best Choice: Strawberry Fruit Spread       SNACKS    BARS  [] Kashi Bars - Chewy or Crunchy; Honey Pilot o Flax or Peanut Butter   [] KIND Bars - 5 Grams of Sugar or Less   [] KIND Protein Bars - Strong and KIND   [] Nature Valley Protein Bar - All Varieties   [] Nature Rosalie Roasted Nut Crunch - Pilot Crunch; Peanut Crunch   [] [facility] Simple Nut Bar - Roasted Peanut & Honey   [] [facility] Simple Nut Bar - Pilot, Cashew & Sea Salt   [] [facility] Nut Chaves Bar - Salted Caramel Peanut   [] Think Thin Protein Bars   [] Quest Bars, Power Crunch Bars, Pure Protein Bars     BEEF JERKY - NITRATE FREE   [] Game On   [] Grass Run Farms   [] Krave   [] Ostrim   [] Perky Jerky   [] Primal Strips Meatless Vegan Jerky   [] [place]     CHIPS   [] Beanitos Chips   [] Fruit Crisps - e.g. Brother's-All-Natural, Bare Fruit, Yoga Chips   [] Milagros's Soy Crisps: 1.3 ounce bag   [] Quest Protein Chips   [] Wasa Whole Wheat Crisp Bread     CRACKERS  [] Amanda's Gone Crackers   [] Nabisco Triscuit: Regular and Thin Crisp Crackers   [] Vans Say Cheese Crackers (G-F)     POPCORN/NUTS   [] Amadou Simeon's Smart Pop Popcorn - Single Serving   [] 100-Calorie Pack of Nuts - All Varieties     PROTEIN POWDERS & DRINKS  []  Protein -  Whey Protein Powder   [] Garden of Life Raw Protein Powder   [] Iconic Ready-To-Drink Protein Drink   [] Baugh One Protein Powder   [] VegaSport Protein Powder     SALSA/HUMMUS/DIPS   [] Eat Well Embrace Life: Zesty Sriracha Carrot o Hummus   [] Pre-Portioned Guacamole Packs   [] Stewart's   [] Tostitos Restaurant Style Salsa       SOUPS   [] Ninfa's Organic Soups - Lentil, Vegetable, Split Pea, Low-Sodium     CANNED GOODS   [] 100% Pure Pumpkin   [] BlueRunner Creole Cream-Style Red Beans or  Navy Beans   [] Cajun Power Chicken Gumbo Base   [] Chicken of the Sea [place]   [] Renny Fresh Cut Sliced Beets   [] Hormel Breast of Chicken in Water   [] LeSuer Tender Baby Whole Carrots   [] Prabhjot Tabasco Vandiver Starter   [] StarKist: Chunk Lite Tuna in Water, Gourmet Select Pouches   [] StarKist: Yellowfin Tuna Fillets   [] Trappey's: Kidney, Butter, [place], Black Eye, Field, and Black Beans   [] ANTOLIN Pichardo's Turnip Greens or Ugo Spinach     CONDIMENTS/ SAUCES/SPREADS/ SPICES  [] Gordon Lewis's Magic Seasonings - Regular or Salt Free   [] Duncan Rehman's Sauces - All Flavors   [] Laughing Cow Light - All Flavors   [] Dash Salt-Free Marinade - All Flavors   [] Leliott & Yolanda's: Heart Smart Pasta Sauces   [] Tabasco     SALAD DRESSINGS  [] Whit's Naturals: Lite Honey Mustard   [] Torin's Own: Lighten Up Salad Dressing - All Varieties   [] OPA Greek Yogurt Dressings - Ranch, Blue o Cheese, Caesar, Feta Dill     SWEETENERS  [] Sweet [place] Sweetener   [] Swerve   [] Truvia     BEVERAGES  [] Coconut Water   [] Crystal Light PURE - All Flavors   [] Honest Tea: Just Green Tea, Unsweetened   [] Kombucha Tea   [] [place]   [] [facility] Bloody Amanda Mix   [] Metromint - Zero-Sugar; All Natural Flavored   [] Rory - Plain or Flavored   [] Cheri Benítez   [] Steaz - Zero-Sugar, All-Natural, Sparkling Tea   [] Tea Bags: Any Brand - e.g. Willy, Yogi, Tazzo, Celestial   [] V8 100% Vegetable Juice   [] Vitamin Water Zero   [] Water   [] Zevia - Stevia Sweetened Soft Drink     BEER/HERNESTO/LIQUORS  []Faria's Premier Light 64 Calories   [] Bud Select - 55 Calories   [] [facility] Bloody Amanda Mix   [] Andrews Genuine Draft - 64 Calories   [] Red or White Wine - All Varieties     CEREALS: HOT/COLD   [] Fabi'magalis Funes's Original Cereal  [] Michel's Mill Oat Bran Hot Cereal - Cracked Wheat, Multi-Grain  [] Kashi GoLean Cereal  [] Kashi GoLearandolph Hot Cereal packets - Vanilla; Honey Cinnamon  [] Leatha's  Special K Protein Cereal  [] Flex's Steel Cut [place] Oatmeal  [] Nature's Path Smart Bran  [] Faith Instant Oatmeal packet, Original  [] Faith Old Fashioned Faith Oats  [] Uncle Prateek's Whole Wheat & Flaxseed Original Cereal

## 2023-01-11 ENCOUNTER — PATIENT MESSAGE (OUTPATIENT)
Dept: INTERNAL MEDICINE | Facility: CLINIC | Age: 38
End: 2023-01-11

## 2023-01-11 ENCOUNTER — CLINICAL SUPPORT (OUTPATIENT)
Dept: INTERNAL MEDICINE | Facility: CLINIC | Age: 38
End: 2023-01-11
Payer: COMMERCIAL

## 2023-01-11 DIAGNOSIS — E53.8 B12 DEFICIENCY: Primary | ICD-10-CM

## 2023-01-11 PROCEDURE — 96372 PR INJECTION,THERAP/PROPH/DIAG2ST, IM OR SUBCUT: ICD-10-PCS | Mod: S$GLB,,, | Performed by: INTERNAL MEDICINE

## 2023-01-11 PROCEDURE — 96372 THER/PROPH/DIAG INJ SC/IM: CPT | Mod: S$GLB,,, | Performed by: INTERNAL MEDICINE

## 2023-01-11 RX ORDER — SCOLOPAMINE TRANSDERMAL SYSTEM 1 MG/1
1 PATCH, EXTENDED RELEASE TRANSDERMAL
Qty: 6 PATCH | Refills: 0 | Status: SHIPPED | OUTPATIENT
Start: 2023-01-11 | End: 2023-02-10

## 2023-01-11 RX ADMIN — CYANOCOBALAMIN 1000 MCG: 1000 INJECTION, SOLUTION INTRAMUSCULAR; SUBCUTANEOUS at 09:01

## 2023-01-19 ENCOUNTER — NUTRITION (OUTPATIENT)
Dept: NUTRITION | Facility: CLINIC | Age: 38
End: 2023-01-19

## 2023-01-19 DIAGNOSIS — Z71.3 NUTRITIONAL COUNSELING: Primary | ICD-10-CM

## 2023-01-19 PROCEDURE — 99499 UNLISTED E&M SERVICE: CPT | Mod: CSM,S$GLB,, | Performed by: DIETITIAN, REGISTERED

## 2023-01-19 PROCEDURE — 99999 PR PBB SHADOW E&M-EST. PATIENT-LVL I: CPT | Mod: PBBFAC,,, | Performed by: DIETITIAN, REGISTERED

## 2023-01-19 PROCEDURE — 99499 NO LOS: ICD-10-PCS | Mod: CSM,S$GLB,, | Performed by: DIETITIAN, REGISTERED

## 2023-01-19 PROCEDURE — 99999 PR PBB SHADOW E&M-EST. PATIENT-LVL I: ICD-10-PCS | Mod: PBBFAC,,, | Performed by: DIETITIAN, REGISTERED

## 2023-01-19 NOTE — PROGRESS NOTES
"Nutrition Assessment for Follow up Medical Nutrition Therapy-- 12 week protocol $575      Reason for MNT visit: Pt in for education and nutrition counseling regarding  longevity, desired weight loss, feel better .       ASSESSMENT    Age: 37 y.o.  Wt: 147 lbs (decrease 3 lbs fat, increase 2 lbs muscle mass)  148.1 lbs (86.9 lbs lean mass, 46.7 lbs skeletal muscle mass, 61.2 lbs fat mass, visceral fat level 14)  REE: 1300  Wt Readings from Last 1 Encounters:   01/03/23 67.2 kg (148 lb 1.6 oz)     Ht:   Ht Readings from Last 1 Encounters:   11/03/22 5' 4" (1.626 m)     BMI:   BMI Readings from Last 1 Encounters:   01/03/23 25.42 kg/m²       Clinical signs/symptoms: weight gain over 1.5 years, high cholesterol    Medical History:   Past Medical History:   Diagnosis Date    Abnormal cervical Papanicolaou smear 2011, 11-19-12    Colposcopy , HONE[facility] 1    Anxiety     Childhood asthma     Cholelithiasis complicating pregnancy, antepartum      Problem List             Resolved    Cholelithiasis: seen on u/s 2014         Anxiety and depression         Acne         Iron deficiency         Vitamin D insufficiency         Mixed hyperlipidemia         Renal stone: R non obstructing 2/22         B12 deficiency            Medications:   Current Outpatient Medications:     cholecalciferol, vitamin D3, (VITAMIN D3) 25 mcg (1,000 unit) capsule, Take 2 capsules (2,000 Units total) by mouth once daily. (Patient not taking: Reported on 11/3/2022), Disp: 180 capsule, Rfl: 3    cyanocobalamin (VITAMIN B-12) 1000 MCG tablet, Take 1 tablet (1,000 mcg total) by mouth once daily., Disp: 30 tablet, Rfl: 12    ferrous sulfate (FEOSOL) 325 mg (65 mg iron) Tab tablet, Take 1 tablet (325 mg total) by mouth 3 (three) times a week. (Patient not taking: Reported on 11/3/2022), Disp: 36 tablet, Rfl: 3    LORazepam (ATIVAN) 0.5 MG tablet, Take 1 tablet (0.5 mg total) by mouth daily as needed for Anxiety., Disp: 30 tablet, Rfl: 0    norgestimate-ethinyl " estradioL (TRI-SPRINTEC, 28,) 0.18/0.215/0.25 mg-35 mcg (28) tablet, Take 1 tablet by mouth once daily., Disp: 84 tablet, Rfl: 4    scopolamine (TRANSDERM-SCOP) 1.3-1.5 mg (1 mg over 3 days), Place 1 patch onto the skin every 72 hours., Disp: 6 patch, Rfl: 0    Current Facility-Administered Medications:     cyanocobalamin injection 1,000 mcg, 1,000 mcg, Intramuscular, Q14 Days, Angelica Marin MD, 1,000 mcg at 01/11/23 0953    Supplements: B12 supplements and B12 injections    Food allergies  intolerances: KATIANA, her son does have tree nuts and soy allergy    Labs:  Reviewed  Hemoglobin A1C   Date Value Ref Range Status   02/17/2022 4.9 4.0 - 5.6 % Final     Comment:     ADA Screening Guidelines:  5.7-6.4%  Consistent with prediabetes  >or=6.5%  Consistent with diabetes    High levels of fetal hemoglobin interfere with the HbA1C  assay. Heterozygous hemoglobin variants (HbS, HgC, etc)do  not significantly interfere with this assay.   However, presence of multiple variants may affect accuracy.     12/13/2017 4.8 4.0 - 5.6 % Final     Comment:     According to ADA guidelines, hemoglobin A1c <7.0% represents  optimal control in non-pregnant diabetic patients. Different  metrics may apply to specific patient populations.   Standards of Medical Care in Diabetes-2016.  For the purpose of screening for the presence of diabetes:  <5.7%     Consistent with the absence of diabetes  5.7-6.4%  Consistent with increasing risk for diabetes   (prediabetes)  >or=6.5%  Consistent with diabetes  Currently, no consensus exists for use of hemoglobin A1c  for diagnosis of diabetes for children.  This Hemoglobin A1c assay has significant interference with fetal   hemoglobin   (HbF). The results are invalid for patients with abnormal amounts of   HbF,   including those with known Hereditary Persistence   of Fetal Hemoglobin. Heterozygous hemoglobin variants (HbAS, HbAC,   HbAD, HbAE, HbA2) do not significantly interfere with this assay;    however, presence of multiple variants in a sample may impact the %   interference.       Cholesterol   Date Value Ref Range Status   02/17/2022 239 (H) 120 - 199 mg/dL Final     Comment:     The National Cholesterol Education Program (NCEP) has set the  following guidelines (reference ranges) for Cholesterol:  Optimal.....................<200 mg/dL  Borderline High.............200-239 mg/dL  High........................> or = 240 mg/dL     09/24/2019 219 (H) 120 - 199 mg/dL Final     Comment:     The National Cholesterol Education Program (NCEP) has set the  following guidelines (reference ranges) for Cholesterol:  Optimal.....................<200 mg/dL  Borderline High.............200-239 mg/dL  High........................> or = 240 mg/dL     01/26/2018 188 <200 mg/dL Final     Triglycerides   Date Value Ref Range Status   02/17/2022 143 30 - 150 mg/dL Final     Comment:     The National Cholesterol Education Program (NCEP) has set the  following guidelines (reference values) for triglycerides:  Normal......................<150 mg/dL  Borderline High.............150-199 mg/dL  High........................200-499 mg/dL     09/24/2019 68 30 - 150 mg/dL Final     Comment:     The National Cholesterol Education Program (NCEP) has set the  following guidelines (reference values) for triglycerides:  Normal......................<150 mg/dL  Borderline High.............150-199 mg/dL  High........................200-499 mg/dL     01/26/2018 50 <150 mg/dL Final     HDL   Date Value Ref Range Status   02/17/2022 75 40 - 75 mg/dL Final     Comment:     The National Cholesterol Education Program (NCEP) has set the  following guidelines (reference values) for HDL Cholesterol:  Low...............<40 mg/dL  Optimal...........>60 mg/dL     09/24/2019 90 (H) 40 - 75 mg/dL Final     Comment:     The National Cholesterol Education Program (NCEP) has set the  following guidelines (reference values) for HDL  Cholesterol:  Low...............<40 mg/dL  Optimal...........>60 mg/dL     01/26/2018 83 >50 mg/dL Final     LDL Cholesterol   Date Value Ref Range Status   02/17/2022 135.4 63.0 - 159.0 mg/dL Final     Comment:     The National Cholesterol Education Program (NCEP) has set the  following guidelines (reference values) for LDL Cholesterol:  Optimal.......................<130 mg/dL  Borderline High...............130-159 mg/dL  High..........................160-189 mg/dL  Very High.....................>190 mg/dL     09/24/2019 115.4 63.0 - 159.0 mg/dL Final     Comment:     The National Cholesterol Education Program (NCEP) has set the  following guidelines (reference values) for LDL Cholesterol:  Optimal.......................<130 mg/dL  Borderline High...............130-159 mg/dL  High..........................160-189 mg/dL  Very High.....................>190 mg/dL     01/26/2018 92 mg/dL (calc) Final     Comment:     Reference range: <100     Desirable range <100 mg/dL for patients with CHD or  diabetes and <70 mg/dL for diabetic patients with  known heart disease.     LDL-C is now calculated using the Renee   calculation, which is a validated novel method providing   better accuracy than the Friedewald equation in the   estimation of LDL-C.   Rashi FERNANDEZ et al. NELSY. 2013;310(19): 7890-9566   (http://education.[employer].com/faq/IPA378)       HDL/Cholesterol Ratio   Date Value Ref Range Status   02/17/2022 31.4 20.0 - 50.0 % Final   09/24/2019 41.1 20.0 - 50.0 % Final   01/26/2018 2.3 <5.0 (calc) Final     Non-HDL Cholesterol   Date Value Ref Range Status   02/17/2022 164 mg/dL Final     Comment:     Risk category and Non-HDL cholesterol goals:  Coronary heart disease (CHD)or equivalent (10-year risk of CHD >20%):  Non-HDL cholesterol goal     <130 mg/dL  Two or more CHD risk factors and 10-year risk of CHD <= 20%:  Non-HDL cholesterol goal     <160 mg/dL  0 to 1 CHD risk factor:  Non-HDL cholesterol  goal     <190 mg/dL     09/24/2019 129 mg/dL Final     Comment:     Risk category and Non-HDL cholesterol goals:  Coronary heart disease (CHD)or equivalent (10-year risk of CHD >20%):  Non-HDL cholesterol goal     <130 mg/dL  Two or more CHD risk factors and 10-year risk of CHD <= 20%:  Non-HDL cholesterol goal     <160 mg/dL  0 to 1 CHD risk factor:  Non-HDL cholesterol goal     <190 mg/dL     01/05/2017 109 mg/dL Final     Comment:     Risk category and Non-HDL cholesterol goals:  Coronary heart disease (CHD)or equivalent (10-year risk of CHD >20%):  Non-HDL cholesterol goal     <130 mg/dL  Two or more CHD risk factors and 10-year risk of CHD <= 20%:  Non-HDL cholesterol goal     <160 mg/dL  0 to 1 CHD risk factor:  Non-HDL cholesterol goal     <190 mg/dL       Non HDL Chol. (LDL+VLDL)   Date Value Ref Range Status   01/26/2018 105 <130 mg/dL (calc) Final     Comment:     For patients with diabetes plus 1 major ASCVD risk   factor, treating to a non-HDL-C goal of <100 mg/dL   (LDL-C of <70 mg/dL) is considered a therapeutic   option.       Vitamin B-12   Date Value Ref Range Status   12/21/2022 241 210 - 950 pg/mL Final   12/21/2022 185 180 - 914 ng/L Final     Comment:     B-12 <400; MMA test was performed.    Test Performed by:  [facility]  [address] [place] [address]  : Moises Lamas M.D. Ph.D.; CLIA# [id number]     10/19/2022 198 (L) 210 - 950 pg/mL Final     TSH   Date Value Ref Range Status   10/19/2022 1.525 0.400 - 4.000 uIU/mL Final         Typical day recall: 1/19/2023- Reviewed and noted during consultation. Pt noticed since our last visit she does not tell herself no. Pt reported that if she has a craving she goes and gets it. She is starting to be more mindful of what she is consuming. Discussed optimizing her meals to work for her. Reviewed meal plan. Pt has been consistent with her workout routine. Pt did report being  constipated. She is leaving for a cruise tomorrow. Pt determined to still make progress.    Reviewed and noted during consultation. Pt reported practicing intermittent fasting most days. She chooses nutrient dense foods. Pt reports that lately she may be over eating or emotional eating and eating higher quantities. Discussed finding balance on her plate and noticing fullness cues.    Beverages: Nespresso and almond milk, 3 bottles of water    Dining out: Regular, 4-6 times per week    Alcohol: nonsmoker, PT reports drinking 1-2 drinks every other weekend    Lifestyle Influences  Support System: Self, family, 7 year old son, pt  passed away in 2016 to cancer    Meal preparation/shopping: self    Current Activity Level: 3 days a week low impact on treadmill, open to working with a .    Patient motivation, anticipated barriers, expected compliance: Patient is motivated and has verbalized understanding and intent to comply    DIAGNOSIS   Problem: Overweight  Etiology: RT increase caloric intake  Signs/Symptoms: AEB food recall, BMI >25    INTERVENTION  Nutrition prescription: estimated energy requirements:   Calories: 1400  Protein: 105-120 g  Carbohydrates: 120-135 g  Focus on plant-based, heart healthy fats  Total Fat: 45-55 g  Baseline for fluids: 75 fl ounces + sweat loss    Recommendations & Goals:  Patient goals and recommendations are tailored to the specific patient's needs, readiness to change, lifestyle, culture, skills, resources, & abilities. Strategies to help achieve these nutrition-related goals were discussed which can include but are not limited to SMART goal setting & mindful eating.     Aim for a minimum of 7 hours sleep   Exercise 60 minutes most days  Eat breakfast within 1-2 hours of waking up  Try not to skip any meals or snacks, not going more than 3-4 hours without eating.   At each meal and snack, try to include a source of fiber + lean protein + healthy fat.     Written  Materials Provided  These resources are intended to assist the patient in making it easier to choose recommended options when eating out & to identify better-for-you brands at the grocery store:    Meal Planning Guide with recommendations discussed along with portion sizes and a customized meal plan   Eat Fit pilo card as a reminder to download the pilo to ones smart phone which provides: current Eat Fit partners, approved menu options, food labels for carb counting, & recipes   On-the-Go Food Guide  Brand Specific Eat Fit Grocery Product list   RD contact information- for patient to contact regarding any questions, needs, and/or concerns that may arise     MONITORING & EVALUATION    Communicated with healthcare provider    Documented plan for referral to appropriate agency/healthcare provider as needed    Comprehension: good     Motivation to change: high    Follow-up: Yes, in 4 weeks    Counseling time: 1 Hours

## 2023-01-24 ENCOUNTER — CLINICAL SUPPORT (OUTPATIENT)
Dept: INTERNAL MEDICINE | Facility: CLINIC | Age: 38
End: 2023-01-24
Payer: COMMERCIAL

## 2023-01-24 DIAGNOSIS — E53.8 B12 DEFICIENCY: Primary | ICD-10-CM

## 2023-01-24 PROCEDURE — 96372 THER/PROPH/DIAG INJ SC/IM: CPT | Mod: S$GLB,,, | Performed by: INTERNAL MEDICINE

## 2023-01-24 PROCEDURE — 96372 PR INJECTION,THERAP/PROPH/DIAG2ST, IM OR SUBCUT: ICD-10-PCS | Mod: S$GLB,,, | Performed by: INTERNAL MEDICINE

## 2023-01-24 RX ADMIN — CYANOCOBALAMIN 1000 MCG: 1000 INJECTION, SOLUTION INTRAMUSCULAR; SUBCUTANEOUS at 10:01

## 2023-01-24 NOTE — PATIENT INSTRUCTIONS
Name: Sofi Vidal  Date: 1/19/2023    Daily Energy Requirements:  Calories: 1400   Protein: 105-120 g   Carbohydrates:  120-135 g  Total Fats: 45-50 g  Focus on Heart Healthy Fats  Limit Saturated fat to 15 g  Fluid: 75 fl ounces + sweat loss   Limit Added sugar to 20 g    Wake6:30am    Water + coffee    Workout 8:00am-9:00am    Breakfast: 9:30am (on workout days)   ~300 calories: 3-4 ounces Protein (21-28 g) + 2-3 servings (30-45 g) of Carbohydrates + heart healthy fats + unlimited non-starchy vegetables   Breakfast option 1: Greek Yogurt Bowl  ¼ cup berries or 1 serving of fruit, sliced  7 ounces of Plain 2% Greek yogurt   1 Tbsp diana seeds  Additional flavor pairings: vanilla extract, lime/lemon juice  Breakfast option 2: Protein Shake  1 scoop protein powder  8-10 ounces Unsweetened [place] milk/soy milk  2 servings of fruit  Smoothie recipes attached  Breakfast option 3: Avocado Toast  1 slice 100% whole wheat bread    ¼ medium avocado or ½ small avocado  4 slices all natural turkey díaz  1 serving fruit  Breakfast option 4: Bagel + light cream cheese  Perfect 10+ Bagel (only available online)  3 Tbsp (or less) of light cream cheese or 2 Tbsp peanut butter  Breakfast option 5: Homemade Eat Fit Berry Blast Protein Smoothie  1 teaspoon Swerve sweetener  2 ounces (1/4 cup) frozen blueberries  8 ounces of unsweetened vanilla almond milk (or you can do 2% Milk)  4 ounces (1/2 cup) frozen strawberries  ¼ teaspoon vanilla extract  1 scoop protein powder  ½-1 teaspoon beet powder   Blend together until smooth  Breakfast option 6: Overnight Oats  4 servings  1 cup Pentecostalism Oats, uncooked  2 Tbsp Diana seeds  Add 2 scoops of protein powder   2% Fairlife milk or unsweetened almond milk (enough to cover the oats to absorb the liquid)  2 Tbsp peanut/nut butter or PB2 powder  Add pumpkin puree/ vanilla extract / berries to help flavor naturally / cinnamon  Agave 5 for sweetener (optional)  Breakfast option 7:  Egg Muffins + Greek yogurt + fruit  2 egg muffins  1 serving of Oikos triple zero Greek yogurt//Fage Greek Yogurt (2%)  1 serving of fruit  Breakfast option 8: Scrambled Eggs and toast  2 egg   1 ounce Reduced fat cheese OR ¼ medium avocado  Unlimited non-starchy vegetables (onion, bell pepper, mushroom, etc)  2 tbsp salsa (optional)  1 slice 100% whole wheat toast OR 1 slice Sagar raisin bread  Breakfast option 9: hard boiled eggs, berries + sausage  2 hard boiled eggs  ½ cup berries  1 serving Cassy chicken/turkey sausage   Breakfast option 10: Cereal  Cereal  1 cup Orgain's Unsweetened Stacy milk OR premade protein shake OR 1 cup 1-2% Milk  1 serving Kashi GoLean Cereal OR Special K Protein Plus OR Esmer's Naturals OR Protein Cheerios, Nature's own optimum power blueberry cinnamon flax  If needed add ¼ - ½ cup of your cereal mixed in  ½-1  scoop of protein powder ( whey, Baugh Plant based protein, or unflavored collagen etc)        Lunch:  11:00am/12:00pm/1:30pm (this may be a bit bigger if you did not have breakfast)  ~300-400 calories: 4-5 ounces Protein (28-35 g) + 2 servings (30 g) of Carbohydrates + heart healthy fats + Unlimited Non-starchy vegetables   Lunch option 1: Salad  4-5 ounces of lean protein (chicken, salmon, turkey, etc)  2 cups dark leafy green mixture (spring mix, kale, arugula, mixed with tomy lettuce)  ½ cup of starch/grain (cubed sweet potatoes, brown rice, etc)  ¼ cup cooked beans (chickpea, black beans, kidney beans etc)  ½ cup berries or fruit, unsweetened dried (2 Tbsp) or fresh  Unlimited non-starchy vegetables  1 ounce dressing (lightly coat the lettuce) Edward's is a good brand  Lunch option 2:  Tacos  2 corn/carb balance tortilla  4-5 ounces lean protein  Shredded lettuce or cabbage  1 ounce cheese  ½ medium avocado  ½ cup Black beans  Salsa to taste  Lunch option 3:  4-5 ounces of lean protein  At least ½ cup cooked non-starchy vegetables (sautéed/baked with olive  oil)  ½ -1 cup sweet potato or medium % whole wheat serving  Lunch option 4: Chicken Salad + crackers + non-starchy veg + fruit  ½ cup chicken salad (Mo's or homemade)  1 serving 100% whole wheat or seed crackers ( Triscuits, Amanda's gone crackers, or recipe linked etc)  1 cup non-starchy vegetables (carrots, bell peppers, cucumbers, etc.)  1 serving of fruit (sliced apple, berries, grapes)  Lunch option 5: [place]jilon   Canjilon   3-4 ounces of lean meat (chosen from meal planning guide--ex. Grilled chicken, turkey breast luncheon meat, chicken/tuna salad)   2 slices of 100% whole wheat bread (ex. 45 jarrell-nature valley 100% whole wheat, lucas's killer bread thin slices)  Dressed with lettuce, sliced tomato, pickles, 1 Tbsp Lawrence + Mustard  At least ½ cup cooked non-starchy vegetables OR at least 1 cup raw vegetables      Always snack:  Unlimited amounts of non-starchy vegetables    Snack:  3:00pm/4:00pm (if going longer than 3-4 hours between meals)  ~100-200 calories: 2-3 ounces Protein (14-21 g) + 1 servings (15-20 g) of Carbohydrates + heart healthy fats + unlimited non-starchy vegetables  Select 1 from the list--All separate options   2 ounce cheese (2 baby bel light cheese circles, light laughing cow triangles) + 1 piece of fruit  1 ounce nuts + 1 piece of fruit   ¼ cup chicken salad + 1 serving whole wheat or seed crackers  1 siggi's no sugar added yogurt + 1 ounce of nuts  ½ cup Hummus + 1 cup mixed raw vegetables for dipping  1/3 cup Plain Greek yogurt flavored with ½ cup sliced fruit + ¼ cup granola + 1 tsp vanilla extract + ½ Tbsp diana or ground flax seeds  Protein bar (Oatmega, Powercrunch, Nature Valley Protein Bar, Kind Protein Bar, No Cow Bar)  Protein shake (Iconic, Premier, Orgain, muscle milk light)  1 cup bell peppers OR 1 serving beanitos + ½ cup guacamole   6 Triscuits, spread of goat cheese topped with a slice of smoked salmon  1-2 tbsp Peanut butter + 1 Apple  1 Tbsp Peanut butter + celery  sticks  ½ cup cottage cheese + 1 serving of fruit/tomatoes  Carrots + plain Greek yogurt + ranch packet      Dinner: 6:30pm/7:00pm   ~300-400 calories: 4-5 ounces of Protein (28-35 g) + Unlimited Non-Starchy Vegetables + 1 Servings of Carbohydrates (15-20 g) + heart healthy fats  Dinner option 1:  4-5 ounces baked or broiled Fish OR Shrimp/Seafood  1 cup (or more) non-starchy vegetables from meal plan guide book cooked with 1 tbsp olive oil  1/2 medium sweet potato OR  1 serving 100% whole wheat option from meal planning guide book  Dinner option 2:  4-5 ounces of lean protein (ex. pork loin, fillet, sirloin, pork chops, chicken without skin)  1 cup cooked non-starchy vegetables  1 serving of 100% whole wheat OR 1 serving of starchy vegetable from meal planning guide book  Dinner option 3:  Spaghetti and meat balls  ¼ cup cooked Banza chickpea pasta / lentil pasta  Unlimited zoodles/ palmini (hearts of palm pasta)  ½ cup marinara sauce  2-3, 2-ounce sized meatballs  Unlimited non starchy vegetables (green beans, brussels, asparagus, cauliflower)  Dinner option 4:  Nachos  1 servings, 11 Beanitos chips  1 ounce of cheddar cheese  3-4 ounces of lean protein (shrimp, shredded chicken, flank steak)  Add-ons: chives, jalapenos, cilantro, salsa  Dinner option 5:  1/2 cup Bean or lentil soup   2-3 ounces lean protein  Unlimited non-starchy vegetables   Dinner option 6:  Any lunch option can be a dinner option  Dinner option 7:  Out to eat, refer to the fueling well on the go guide and the restaurant tips below to choose better-for-you options      **If craving something sweet, as a sometimes treat, try one of these options:  ~100 calories:  ¾ cup (frozen) mixed berries with 1-2 Tbsp coconut whip cream   1 swerve chocolate chip cookie or other Swerve recipes  1 serving Halo Top Ice Cream  A square of Parul's Chocolate  Peanut butter whip--light whip cream, PB2 powder, collagen/ protein powder, SF pudding packet  ½ cup plain  Greek yogurt mix in 1 tbsp SF pudding mix--freeze 1+ hour(s)   Protein shake, put in freezer for 30 minutes to get ice cold  Dole dippers      **Try to be finished eating by 7:30pm    Bed time routine, relaxing the body (breathing exercises, meditation, reading)    Bed 10:30pm   Additional Notes:  Incorporate a source of lean protein, fiber, and heart healthy fat with each meal.   These three nutrients take the longest to digest, so we feel full longer and it helps not spike our blood glucose levels.  How to Read a Label:  Ingredients first: ingredients are listed from most to least, what is at the top is most occurring  Serving Size: this is the amount that the numbers represent. If you eat less, divide, if you eat more multiply the numbers  Added sugar: Added sugar will be listed under carbohydrates and total sugar. Per product/meal you want your added sugar to be below 7 grams, ideally not more than 20 grams for your whole day  Ensure Balance: Add up the grams from fiber, total fat and protein = x, compare total of Fiber + Fat + Protein to the total carbohydrates.   Looking for a 1:1 ratio or higher fiber, fat, and protein than total carbohydrates.  Restaurant Tips:   Enjoy lean proteins such as fish, shellfish, skinless poultry, pork loin, filet or beans.   Select items grilled, baked, broiled, braised, poached or roasted.   Try an appetizer-sized entrée or split a regular-sized entrée with a friend.   Avoid crispy, crunchy, breaded, paneed or stuffed items.   Request dishes to be prepared without added fats (oils, butter).   Avoid items that are cream-based, au gratin or buttered.   Order sauces, dressings and gravies on the side.   Instead of an appetizer, bread (or chips), alcoholic drink and dessert, choose just one to have with your entrée.    Don't drink your calories: avoid sugary soft drinks, juices and mixers.   Limit high calorie salad additives like cheese, croutons, avocado and díaz -  choose just one to two (see below under salad fats for more examples).   Hold the starchy side dish - request extra vegetables instead.   Order vegetables steamed or stir-fried instead of sautéed to avoid excess oils. Ask for olive oil on the side and drizzle over veggies.  Eating out in [place]:  [email or web address]  [employer] has Eat Fit options  [employer] Eats has Eat Fit options  [facility] Eat Fit pilo (Free pilo for smartphones)  List of all Eat Fit restaurants  See what dishes are Eat Fit at each restaurant  See the nutrition facts for each Eat Fit dish  Provides >200 Eat Fit recipes  Fast Food Lite Guide  Salad Fats: Pick 2, each being 2 Tbsp:  Dressing  Cheese  Nuts  [place]  Avocado (1/2 of a small avocado okay)  Guacamole  Sour cream  Egg (1 okay)    Aisle products: Think Valente #7 à Look for products that have <7 grams sugar, and at least 7 grams or more protein  -Choose 100% whole wheat/whole grain products (Essentially, 'wheat' bread and 'wheat' flour mean white bread/flour)  -4 things that cause inflammation to our body:  White/refined carbohydrates  Sugar  Alcohol  Fried foods  How to make a healthy smoothie:  Choose a protein source  Plain Greek yogurt or 2% cottage cheese  Examples of plant-based protein powders:  Keri  [facility]  Baugh  Garden of Life RAW  100% whey protein powder  Add a piece of fruit -or- 1 cup chopped fresh or frozen unsweetened fruit  Choose a fat source:  Peanut Butter  Avocado  Extra Virgin Olive Oil  Coconut Oil   Choose a liquid:  Unsweetened almond milk  hemp milk  cashew milk, etc  Fairlife milk (skim, 1% or 2%)  Water   Healthy add-ins for a boost:  1 Tbsp flaxseeds or diana seeds  1 Tbsp Vital protein collagen peptides  Add ice and blend!   To step it up a notch, use frozen PLAIN cauliflower florets in place of ice to provide more nutrients  Top food brands:  100% whole grain chips:  SunChips  Beanito's  Beanfields  100% whole grain crackers:  Triscuits  Blue Suri almond  nut thins  Amanda's Gone crackers  Wheat thins  100% whole grain breads:  Alvaro's Killer 21 whole grains and seeds  Alvaro's Killer Powerseed  Nature's Own 100% whole wheat  100% whole grain pancake mix:  [place] cake 100% whole grain pancake mix  Whole grain tortillas/wraps:  [place]   La Tortilla Factory 50-calorie low carb high fiber wrap  Coconut wrap (typically found in refrigerated section by cheese)  ROBERTO CARLOS YOLIS 100% whole wheat 6  Better-for-you pasta:  Banza chickpea  Red Lentil  Black bean pasta (aka squid ink pasta)  Ancient [place] gluten free quinoa pasta (found on gluten free aisle. This brand tastes like white pasta, but is a whole grain)  Lower sugar red sauce in a jar:  Elliott & Yolanda's Heart Smart (this one is also lower in sodium)  Classico Original  Plant Strong Engine 2  100% whole grain frozen waffles:  Off the Grid   VAN's power grains or flax  Kashi 7 grain  100% whole grain cereals   Special K PROTEIN  Nutritious [place] Hi-Lo  Esmer's Natural's (online only)  Kashi GoLEAN  Better-for-you ice cream/ice cream bars:  Halo Top high protein ice cream pints  Halo Top 'pops'  Enlightened ice cream bar  [place]asso Frozen Greek yogurt bar  Favorite brands of lactose free milk that contain half the sugar + double the protein and lasts ~2 months in your fridge compared to regular milk:  Fairlife milk  (1% or 2%)  Organic Valley ULTRA reduced fat milk   Flavored Greek yogurt:  Chobani Less sugar  Oikos Triple Zero  Two Good  Siggi's no added sugar  Supplements:  Multi Vitamin  One a Day  Fish oil (pick one)  = 2800 mg EPA and DHA + 1,000 IU Vitamin D    (liquid or capsules) = 1600 mg EPA and DHA  Vitamin D3  1,25-Dihydroxycholecalciferol   1,000 -5,000 IU (You have a low Vitamin D level)  Doctor prescribed for more than 5,000 IU  Probiotic  When choosing a probiotic, look for ones that contains at least 30 billion colony forming units (CFU) and at least 10 bacteria strains (preferred strains are between 10-30  strains)  Brand examples:  [place] Formulas (refrigerated)  Garden of Life  MegaFood  Nature Made  [employer] brand  Nature Way (also called Alive)

## 2023-02-07 ENCOUNTER — CLINICAL SUPPORT (OUTPATIENT)
Dept: INTERNAL MEDICINE | Facility: CLINIC | Age: 38
End: 2023-02-07
Payer: COMMERCIAL

## 2023-02-07 PROCEDURE — 96372 PR INJECTION,THERAP/PROPH/DIAG2ST, IM OR SUBCUT: ICD-10-PCS | Mod: S$GLB,,, | Performed by: INTERNAL MEDICINE

## 2023-02-07 PROCEDURE — 96372 THER/PROPH/DIAG INJ SC/IM: CPT | Mod: S$GLB,,, | Performed by: INTERNAL MEDICINE

## 2023-02-07 RX ADMIN — CYANOCOBALAMIN 1000 MCG: 1000 INJECTION, SOLUTION INTRAMUSCULAR; SUBCUTANEOUS at 09:02

## 2023-02-09 ENCOUNTER — NUTRITION (OUTPATIENT)
Dept: NUTRITION | Facility: CLINIC | Age: 38
End: 2023-02-09

## 2023-02-09 DIAGNOSIS — Z71.3 NUTRITIONAL COUNSELING: Primary | ICD-10-CM

## 2023-02-09 PROCEDURE — 99499 NO LOS: ICD-10-PCS | Mod: CSM,S$GLB,, | Performed by: DIETITIAN, REGISTERED

## 2023-02-09 PROCEDURE — 99999 PR PBB SHADOW E&M-EST. PATIENT-LVL I: ICD-10-PCS | Mod: PBBFAC,,, | Performed by: DIETITIAN, REGISTERED

## 2023-02-09 PROCEDURE — 99999 PR PBB SHADOW E&M-EST. PATIENT-LVL I: CPT | Mod: PBBFAC,,, | Performed by: DIETITIAN, REGISTERED

## 2023-02-09 PROCEDURE — 99499 UNLISTED E&M SERVICE: CPT | Mod: CSM,S$GLB,, | Performed by: DIETITIAN, REGISTERED

## 2023-02-09 NOTE — PROGRESS NOTES
"Nutrition Assessment for Follow up Medical Nutrition Therapy-- 12 week protocol $575      Reason for MNT visit: Pt in for education and nutrition counseling regarding  longevity, desired weight loss, feel better .       ASSESSMENT    Age: 37 y.o.  Wt: 147.3 lbs (89.5 lbs lean body mass, 48.7 lbs skeletal muscle mass, 57.8 lbs fat mass visceral fat level 13)  147 lbs (decrease 3 lbs fat, increase 2 lbs muscle mass)  148.1 lbs (86.9 lbs lean mass, 46.7 lbs skeletal muscle mass, 61.2 lbs fat mass, visceral fat level 14)  REE: 1300  Wt Readings from Last 1 Encounters:   01/03/23 67.2 kg (148 lb 1.6 oz)     Ht:   Ht Readings from Last 1 Encounters:   11/03/22 5' 4" (1.626 m)     BMI:   BMI Readings from Last 1 Encounters:   01/03/23 25.42 kg/m²       Clinical signs/symptoms: weight gain over 1.5 years, high cholesterol    Medical History:   Past Medical History:   Diagnosis Date    Abnormal cervical Papanicolaou smear 2011, 11-19-12    Colposcopy , HONEY 1    Anxiety     Childhood asthma     Cholelithiasis complicating pregnancy, antepartum      Problem List             Resolved    Cholelithiasis: seen on u/s 2014         Anxiety and depression         Acne         Iron deficiency         Vitamin D insufficiency         Mixed hyperlipidemia         Renal stone: R non obstructing 2/22         B12 deficiency            Medications:   Current Outpatient Medications:     cholecalciferol, vitamin D3, (VITAMIN D3) 25 mcg (1,000 unit) capsule, Take 2 capsules (2,000 Units total) by mouth once daily. (Patient not taking: Reported on 11/3/2022), Disp: 180 capsule, Rfl: 3    cyanocobalamin (VITAMIN B-12) 1000 MCG tablet, Take 1 tablet (1,000 mcg total) by mouth once daily., Disp: 30 tablet, Rfl: 12    ferrous sulfate (FEOSOL) 325 mg (65 mg iron) Tab tablet, Take 1 tablet (325 mg total) by mouth 3 (three) times a week. (Patient not taking: Reported on 11/3/2022), Disp: 36 tablet, Rfl: 3    LORazepam (ATIVAN) 0.5 MG tablet, Take 1 " tablet (0.5 mg total) by mouth daily as needed for Anxiety., Disp: 30 tablet, Rfl: 0    norgestimate-ethinyl estradioL (TRI-SPRINTEC, 28,) 0.18/0.215/0.25 mg-35 mcg (28) tablet, Take 1 tablet by mouth once daily., Disp: 84 tablet, Rfl: 4    scopolamine (TRANSDERM-SCOP) 1.3-1.5 mg (1 mg over 3 days), Place 1 patch onto the skin every 72 hours., Disp: 6 patch, Rfl: 0    Current Facility-Administered Medications:     cyanocobalamin injection 1,000 mcg, 1,000 mcg, Intramuscular, Q14 Days, Angelica Marin MD, 1,000 mcg at 02/07/23 0918    Supplements: B12 supplements and B12 injections    Food allergies  intolerances: NKGARRY, her son does have tree nuts and soy allergy    Labs:  Reviewed  Hemoglobin A1C   Date Value Ref Range Status   02/17/2022 4.9 4.0 - 5.6 % Final     Comment:     ADA Screening Guidelines:  5.7-6.4%  Consistent with prediabetes  >or=6.5%  Consistent with diabetes    High levels of fetal hemoglobin interfere with the HbA1C  assay. Heterozygous hemoglobin variants (HbS, HgC, etc)do  not significantly interfere with this assay.   However, presence of multiple variants may affect accuracy.     12/13/2017 4.8 4.0 - 5.6 % Final     Comment:     According to ADA guidelines, hemoglobin A1c <7.0% represents  optimal control in non-pregnant diabetic patients. Different  metrics may apply to specific patient populations.   Standards of Medical Care in Diabetes-2016.  For the purpose of screening for the presence of diabetes:  <5.7%     Consistent with the absence of diabetes  5.7-6.4%  Consistent with increasing risk for diabetes   (prediabetes)  >or=6.5%  Consistent with diabetes  Currently, no consensus exists for use of hemoglobin A1c  for diagnosis of diabetes for children.  This Hemoglobin A1c assay has significant interference with fetal   hemoglobin   (HbF). The results are invalid for patients with abnormal amounts of   HbF,   including those with known Hereditary Persistence   of Fetal Hemoglobin.  Heterozygous hemoglobin variants (HbAS, HbAC,   HbAD, HbAE, HbA2) do not significantly interfere with this assay;   however, presence of multiple variants in a sample may impact the %   interference.       Cholesterol   Date Value Ref Range Status   02/17/2022 239 (H) 120 - 199 mg/dL Final     Comment:     The National Cholesterol Education Program (NCEP) has set the  following guidelines (reference ranges) for Cholesterol:  Optimal.....................<200 mg/dL  Borderline High.............200-239 mg/dL  High........................> or = 240 mg/dL     09/24/2019 219 (H) 120 - 199 mg/dL Final     Comment:     The National Cholesterol Education Program (NCEP) has set the  following guidelines (reference ranges) for Cholesterol:  Optimal.....................<200 mg/dL  Borderline High.............200-239 mg/dL  High........................> or = 240 mg/dL     01/26/2018 188 <200 mg/dL Final     Triglycerides   Date Value Ref Range Status   02/17/2022 143 30 - 150 mg/dL Final     Comment:     The National Cholesterol Education Program (NCEP) has set the  following guidelines (reference values) for triglycerides:  Normal......................<150 mg/dL  Borderline High.............150-199 mg/dL  High........................200-499 mg/dL     09/24/2019 68 30 - 150 mg/dL Final     Comment:     The National Cholesterol Education Program (NCEP) has set the  following guidelines (reference values) for triglycerides:  Normal......................<150 mg/dL  Borderline High.............150-199 mg/dL  High........................200-499 mg/dL     01/26/2018 50 <150 mg/dL Final     HDL   Date Value Ref Range Status   02/17/2022 75 40 - 75 mg/dL Final     Comment:     The National Cholesterol Education Program (NCEP) has set the  following guidelines (reference values) for HDL Cholesterol:  Low...............<40 mg/dL  Optimal...........>60 mg/dL     09/24/2019 90 (H) 40 - 75 mg/dL Final     Comment:     The National  Cholesterol Education Program (NCEP) has set the  following guidelines (reference values) for HDL Cholesterol:  Low...............<40 mg/dL  Optimal...........>60 mg/dL     01/26/2018 83 >50 mg/dL Final     LDL Cholesterol   Date Value Ref Range Status   02/17/2022 135.4 63.0 - 159.0 mg/dL Final     Comment:     The National Cholesterol Education Program (NCEP) has set the  following guidelines (reference values) for LDL Cholesterol:  Optimal.......................<130 mg/dL  Borderline High...............130-159 mg/dL  High..........................160-189 mg/dL  Very High.....................>190 mg/dL     09/24/2019 115.4 63.0 - 159.0 mg/dL Final     Comment:     The National Cholesterol Education Program (NCEP) has set the  following guidelines (reference values) for LDL Cholesterol:  Optimal.......................<130 mg/dL  Borderline High...............130-159 mg/dL  High..........................160-189 mg/dL  Very High.....................>190 mg/dL     01/26/2018 92 mg/dL (calc) Final     Comment:     Reference range: <100     Desirable range <100 mg/dL for patients with CHD or  diabetes and <70 mg/dL for diabetic patients with  known heart disease.     LDL-C is now calculated using the Renee   calculation, which is a validated novel method providing   better accuracy than the Friedewald equation in the   estimation of LDL-C.   Rashi FERNANDEZ et al. NELSY. 2013;310(19): 4417-7638   (http://education.epicurio.BevSpot/faq/VUM519)       HDL/Cholesterol Ratio   Date Value Ref Range Status   02/17/2022 31.4 20.0 - 50.0 % Final   09/24/2019 41.1 20.0 - 50.0 % Final   01/26/2018 2.3 <5.0 (calc) Final     Non-HDL Cholesterol   Date Value Ref Range Status   02/17/2022 164 mg/dL Final     Comment:     Risk category and Non-HDL cholesterol goals:  Coronary heart disease (CHD)or equivalent (10-year risk of CHD >20%):  Non-HDL cholesterol goal     <130 mg/dL  Two or more CHD risk factors and 10-year risk of CHD  <= 20%:  Non-HDL cholesterol goal     <160 mg/dL  0 to 1 CHD risk factor:  Non-HDL cholesterol goal     <190 mg/dL     09/24/2019 129 mg/dL Final     Comment:     Risk category and Non-HDL cholesterol goals:  Coronary heart disease (CHD)or equivalent (10-year risk of CHD >20%):  Non-HDL cholesterol goal     <130 mg/dL  Two or more CHD risk factors and 10-year risk of CHD <= 20%:  Non-HDL cholesterol goal     <160 mg/dL  0 to 1 CHD risk factor:  Non-HDL cholesterol goal     <190 mg/dL     01/05/2017 109 mg/dL Final     Comment:     Risk category and Non-HDL cholesterol goals:  Coronary heart disease (CHD)or equivalent (10-year risk of CHD >20%):  Non-HDL cholesterol goal     <130 mg/dL  Two or more CHD risk factors and 10-year risk of CHD <= 20%:  Non-HDL cholesterol goal     <160 mg/dL  0 to 1 CHD risk factor:  Non-HDL cholesterol goal     <190 mg/dL       Non HDL Chol. (LDL+VLDL)   Date Value Ref Range Status   01/26/2018 105 <130 mg/dL (calc) Final     Comment:     For patients with diabetes plus 1 major ASCVD risk   factor, treating to a non-HDL-C goal of <100 mg/dL   (LDL-C of <70 mg/dL) is considered a therapeutic   option.       Vitamin B-12   Date Value Ref Range Status   12/21/2022 241 210 - 950 pg/mL Final   12/21/2022 185 180 - 914 ng/L Final     Comment:     B-12 <400; MMA test was performed.    Test Performed by:  [facility]  [address] [place] [address]  : Moises Lamas M.D. Ph.D.; CLIA# [id number]     10/19/2022 198 (L) 210 - 950 pg/mL Final     TSH   Date Value Ref Range Status   10/19/2022 1.525 0.400 - 4.000 uIU/mL Final         Typical day recall: 2/9/2023- Reviewed and noted during consultation. Pt has stayed consistent with her workout regimen. She has worked it into her daily routine after bringing her son to school. She is concerned about when her son has off for [place] Gras. Discussed making a plan with one of the other  moms that her son is friends with and arrange for him to have a play date while she goes to the gym. Pt also has a challenge of not always planning ahead. Discussed having more accountability of emailing weekly her meal schedule so she is prepared with purchasing food items needed to create new recipes.    1/19/2023- Reviewed and noted during consultation. Pt noticed since our last visit she does not tell herself no. Pt reported that if she has a craving she goes and gets it. She is starting to be more mindful of what she is consuming. Discussed optimizing her meals to work for her. Reviewed meal plan. Pt has been consistent with her workout routine. Pt did report being constipated. She is leaving for a cruise tomorrow. Pt determined to still make progress.    Reviewed and noted during consultation. Pt reported practicing intermittent fasting most days. She chooses nutrient dense foods. Pt reports that lately she may be over eating or emotional eating and eating higher quantities. Discussed finding balance on her plate and noticing fullness cues.    Beverages: Nespresso and almond milk, 3 bottles of water    Dining out: Regular, 4-6 times per week    Alcohol: nonsmoker, PT reports drinking 1-2 drinks every other weekend    Lifestyle Influences  Support System: Self, family, 7 year old son, pt  passed away in 2016 to cancer    Meal preparation/shopping: self    Current Activity Level: 3 days a week low impact on treadmill, open to working with a .    Patient motivation, anticipated barriers, expected compliance: Patient is motivated and has verbalized understanding and intent to comply    DIAGNOSIS   Problem: Overweight  Etiology: RT increase caloric intake  Signs/Symptoms: AEB food recall, BMI >25    INTERVENTION  Nutrition prescription: estimated energy requirements:   Calories: 1400  Protein: 105-120 g  Carbohydrates: 120-135 g  Focus on plant-based, heart healthy fats  Total Fat: 45-55 g  Baseline  for fluids: 75 fl ounces + sweat loss    Recommendations & Goals:  Patient goals and recommendations are tailored to the specific patient's needs, readiness to change, lifestyle, culture, skills, resources, & abilities. Strategies to help achieve these nutrition-related goals were discussed which can include but are not limited to SMART goal setting & mindful eating.     Aim for a minimum of 7 hours sleep   Exercise 60 minutes most days  Eat breakfast within 1-2 hours of waking up  Try not to skip any meals or snacks, not going more than 3-4 hours without eating.   At each meal and snack, try to include a source of fiber + lean protein + healthy fat.     Written Materials Provided  These resources are intended to assist the patient in making it easier to choose recommended options when eating out & to identify better-for-you brands at the grocery store:    Meal Planning Guide with recommendations discussed along with portion sizes and a customized meal plan   Eat Fit pilo card as a reminder to download the pilo to ones smart phone which provides: current Eat Fit partners, approved menu options, food labels for carb counting, & recipes   On-the-Go Food Guide  Brand Specific Eat Fit Grocery Product list   RD contact information- for patient to contact regarding any questions, needs, and/or concerns that may arise     MONITORING & EVALUATION    Communicated with healthcare provider    Documented plan for referral to appropriate agency/healthcare provider as needed    Comprehension: good     Motivation to change: high    Follow-up: Yes, in 4 weeks    Counseling time: 1 Hours

## 2023-02-10 VITALS — BODY MASS INDEX: 25.28 KG/M2 | WEIGHT: 147.31 LBS

## 2023-02-22 ENCOUNTER — CLINICAL SUPPORT (OUTPATIENT)
Dept: INTERNAL MEDICINE | Facility: CLINIC | Age: 38
End: 2023-02-22
Payer: COMMERCIAL

## 2023-02-22 PROCEDURE — 96372 PR INJECTION,THERAP/PROPH/DIAG2ST, IM OR SUBCUT: ICD-10-PCS | Mod: S$GLB,,, | Performed by: INTERNAL MEDICINE

## 2023-02-22 PROCEDURE — 96372 THER/PROPH/DIAG INJ SC/IM: CPT | Mod: S$GLB,,, | Performed by: INTERNAL MEDICINE

## 2023-02-22 RX ADMIN — CYANOCOBALAMIN 1000 MCG: 1000 INJECTION, SOLUTION INTRAMUSCULAR; SUBCUTANEOUS at 09:02

## 2023-02-28 ENCOUNTER — LAB VISIT (OUTPATIENT)
Dept: LAB | Facility: HOSPITAL | Age: 38
End: 2023-02-28
Attending: INTERNAL MEDICINE
Payer: COMMERCIAL

## 2023-02-28 DIAGNOSIS — E53.8 B12 DEFICIENCY: ICD-10-CM

## 2023-02-28 PROCEDURE — 82607 VITAMIN B-12: CPT | Performed by: INTERNAL MEDICINE

## 2023-02-28 PROCEDURE — 36415 COLL VENOUS BLD VENIPUNCTURE: CPT | Mod: PO | Performed by: INTERNAL MEDICINE

## 2023-03-01 ENCOUNTER — TELEPHONE (OUTPATIENT)
Dept: INTERNAL MEDICINE | Facility: CLINIC | Age: 38
End: 2023-03-01
Payer: COMMERCIAL

## 2023-03-01 LAB — VIT B12 SERPL-MCNC: 411 PG/ML (ref 210–950)

## 2023-03-01 NOTE — TELEPHONE ENCOUNTER
----- Message from Angelica Marin MD sent at 3/1/2023  2:34 PM CST -----  Needs EP in 6 months.  Please place on hold list.  Thanks

## 2023-03-08 ENCOUNTER — CLINICAL SUPPORT (OUTPATIENT)
Dept: INTERNAL MEDICINE | Facility: CLINIC | Age: 38
End: 2023-03-08
Payer: COMMERCIAL

## 2023-03-08 PROCEDURE — 96372 THER/PROPH/DIAG INJ SC/IM: CPT | Mod: S$GLB,,, | Performed by: INTERNAL MEDICINE

## 2023-03-08 PROCEDURE — 96372 PR INJECTION,THERAP/PROPH/DIAG2ST, IM OR SUBCUT: ICD-10-PCS | Mod: S$GLB,,, | Performed by: INTERNAL MEDICINE

## 2023-03-08 PROCEDURE — 99999 PR PBB SHADOW E&M-EST. PATIENT-LVL II: CPT | Mod: PBBFAC,,,

## 2023-03-08 PROCEDURE — 99999 PR PBB SHADOW E&M-EST. PATIENT-LVL II: ICD-10-PCS | Mod: PBBFAC,,,

## 2023-03-08 RX ADMIN — CYANOCOBALAMIN 1000 MCG: 1000 INJECTION, SOLUTION INTRAMUSCULAR; SUBCUTANEOUS at 09:03

## 2023-03-21 ENCOUNTER — TELEPHONE (OUTPATIENT)
Dept: INTERNAL MEDICINE | Facility: CLINIC | Age: 38
End: 2023-03-21
Payer: COMMERCIAL

## 2023-03-21 NOTE — TELEPHONE ENCOUNTER
----- Message from Nancy Andrews sent at 3/21/2023 10:28 AM CDT -----  Contact: [phone]    Patient: Sofi Vidal-    Reason: The patient is requesting a call back from the nurse to get assistance with scheduling an     appointment for a B-12 shot.     Comments: Please call the patient back to advise.

## 2023-03-22 ENCOUNTER — CLINICAL SUPPORT (OUTPATIENT)
Dept: INTERNAL MEDICINE | Facility: CLINIC | Age: 38
End: 2023-03-22
Payer: COMMERCIAL

## 2023-03-22 PROCEDURE — 96372 PR INJECTION,THERAP/PROPH/DIAG2ST, IM OR SUBCUT: ICD-10-PCS | Mod: S$GLB,,, | Performed by: INTERNAL MEDICINE

## 2023-03-22 PROCEDURE — 96372 THER/PROPH/DIAG INJ SC/IM: CPT | Mod: S$GLB,,, | Performed by: INTERNAL MEDICINE

## 2023-03-22 RX ADMIN — CYANOCOBALAMIN 1000 MCG: 1000 INJECTION, SOLUTION INTRAMUSCULAR; SUBCUTANEOUS at 08:03

## 2023-04-06 ENCOUNTER — CLINICAL SUPPORT (OUTPATIENT)
Dept: INTERNAL MEDICINE | Facility: CLINIC | Age: 38
End: 2023-04-06
Payer: COMMERCIAL

## 2023-04-06 PROCEDURE — 96372 PR INJECTION,THERAP/PROPH/DIAG2ST, IM OR SUBCUT: ICD-10-PCS | Mod: S$GLB,,, | Performed by: INTERNAL MEDICINE

## 2023-04-06 PROCEDURE — 96372 THER/PROPH/DIAG INJ SC/IM: CPT | Mod: S$GLB,,, | Performed by: INTERNAL MEDICINE

## 2023-04-06 RX ADMIN — CYANOCOBALAMIN 1000 MCG: 1000 INJECTION, SOLUTION INTRAMUSCULAR; SUBCUTANEOUS at 10:04

## 2023-04-12 ENCOUNTER — TELEPHONE (OUTPATIENT)
Dept: DERMATOLOGY | Facility: CLINIC | Age: 38
End: 2023-04-12
Payer: COMMERCIAL

## 2023-04-12 NOTE — TELEPHONE ENCOUNTER
Called patient and left message that her appointment for 5/11/23 with Dr. Pagan has been canceled due to she will not be in the office that day.  Patient to call back to reschedule.

## 2023-04-19 ENCOUNTER — CLINICAL SUPPORT (OUTPATIENT)
Dept: INTERNAL MEDICINE | Facility: CLINIC | Age: 38
End: 2023-04-19
Payer: COMMERCIAL

## 2023-04-19 ENCOUNTER — TELEPHONE (OUTPATIENT)
Dept: INTERNAL MEDICINE | Facility: CLINIC | Age: 38
End: 2023-04-19
Payer: COMMERCIAL

## 2023-04-19 PROCEDURE — 96372 PR INJECTION,THERAP/PROPH/DIAG2ST, IM OR SUBCUT: ICD-10-PCS | Mod: S$GLB,,, | Performed by: INTERNAL MEDICINE

## 2023-04-19 PROCEDURE — 96372 THER/PROPH/DIAG INJ SC/IM: CPT | Mod: S$GLB,,, | Performed by: INTERNAL MEDICINE

## 2023-04-19 RX ADMIN — CYANOCOBALAMIN 1000 MCG: 1000 INJECTION, SOLUTION INTRAMUSCULAR; SUBCUTANEOUS at 02:04

## 2023-04-19 NOTE — TELEPHONE ENCOUNTER
----- Message from Sobeida Williamson sent at 4/19/2023  9:08 AM CDT -----  Contact: Pt Mobile [phone]  Patient would like a call back in regards to her wanting to schedule her B Twelve shot please.

## 2023-05-01 ENCOUNTER — TELEPHONE (OUTPATIENT)
Dept: DERMATOLOGY | Facility: CLINIC | Age: 38
End: 2023-05-01
Payer: COMMERCIAL

## 2023-05-01 NOTE — TELEPHONE ENCOUNTER
----- Message from Martha Sharp sent at 5/1/2023  9:45 AM CDT -----  Regarding: appt  Contact: @ [phone]  Pt requesting a appointment for the following annual .Skin Check/Screening   (Annual/routine appointment for skin check) her appointment was cancelled by LPN due to doctor not being in Please call and adv @ [phone]

## 2023-05-03 ENCOUNTER — CLINICAL SUPPORT (OUTPATIENT)
Dept: INTERNAL MEDICINE | Facility: CLINIC | Age: 38
End: 2023-05-03
Payer: COMMERCIAL

## 2023-05-03 PROCEDURE — 96372 PR INJECTION,THERAP/PROPH/DIAG2ST, IM OR SUBCUT: ICD-10-PCS | Mod: S$GLB,,, | Performed by: INTERNAL MEDICINE

## 2023-05-03 PROCEDURE — 96372 THER/PROPH/DIAG INJ SC/IM: CPT | Mod: S$GLB,,, | Performed by: INTERNAL MEDICINE

## 2023-05-03 RX ADMIN — CYANOCOBALAMIN 1000 MCG: 1000 INJECTION, SOLUTION INTRAMUSCULAR; SUBCUTANEOUS at 09:05

## 2023-05-04 ENCOUNTER — OFFICE VISIT (OUTPATIENT)
Dept: OBSTETRICS AND GYNECOLOGY | Facility: CLINIC | Age: 38
End: 2023-05-04
Payer: COMMERCIAL

## 2023-05-04 VITALS
WEIGHT: 144.19 LBS | DIASTOLIC BLOOD PRESSURE: 68 MMHG | HEIGHT: 64 IN | SYSTOLIC BLOOD PRESSURE: 100 MMHG | BODY MASS INDEX: 24.62 KG/M2

## 2023-05-04 DIAGNOSIS — Z30.9 ENCOUNTER FOR CONTRACEPTIVE MANAGEMENT, UNSPECIFIED TYPE: ICD-10-CM

## 2023-05-04 DIAGNOSIS — Z01.419 ENCOUNTER FOR GYNECOLOGICAL EXAMINATION WITHOUT ABNORMAL FINDING: Primary | ICD-10-CM

## 2023-05-04 DIAGNOSIS — Z12.31 BREAST CANCER SCREENING BY MAMMOGRAM: ICD-10-CM

## 2023-05-04 DIAGNOSIS — Z12.31 ENCOUNTER FOR SCREENING MAMMOGRAM FOR BREAST CANCER: ICD-10-CM

## 2023-05-04 PROCEDURE — 99999 PR PBB SHADOW E&M-EST. PATIENT-LVL III: ICD-10-PCS | Mod: PBBFAC,,, | Performed by: OBSTETRICS & GYNECOLOGY

## 2023-05-04 PROCEDURE — 99395 PR PREVENTIVE VISIT,EST,18-39: ICD-10-PCS | Mod: S$GLB,,, | Performed by: OBSTETRICS & GYNECOLOGY

## 2023-05-04 PROCEDURE — 99999 PR PBB SHADOW E&M-EST. PATIENT-LVL III: CPT | Mod: PBBFAC,,, | Performed by: OBSTETRICS & GYNECOLOGY

## 2023-05-04 PROCEDURE — 88175 CYTOPATH C/V AUTO FLUID REDO: CPT | Performed by: OBSTETRICS & GYNECOLOGY

## 2023-05-04 PROCEDURE — 3008F BODY MASS INDEX DOCD: CPT | Mod: CPTII,S$GLB,, | Performed by: OBSTETRICS & GYNECOLOGY

## 2023-05-04 PROCEDURE — 3008F PR BODY MASS INDEX (BMI) DOCUMENTED: ICD-10-PCS | Mod: CPTII,S$GLB,, | Performed by: OBSTETRICS & GYNECOLOGY

## 2023-05-04 PROCEDURE — 1159F MED LIST DOCD IN RCRD: CPT | Mod: CPTII,S$GLB,, | Performed by: OBSTETRICS & GYNECOLOGY

## 2023-05-04 PROCEDURE — 3078F PR MOST RECENT DIASTOLIC BLOOD PRESSURE < 80 MM HG: ICD-10-PCS | Mod: CPTII,S$GLB,, | Performed by: OBSTETRICS & GYNECOLOGY

## 2023-05-04 PROCEDURE — 99395 PREV VISIT EST AGE 18-39: CPT | Mod: S$GLB,,, | Performed by: OBSTETRICS & GYNECOLOGY

## 2023-05-04 PROCEDURE — 1160F RVW MEDS BY RX/DR IN RCRD: CPT | Mod: CPTII,S$GLB,, | Performed by: OBSTETRICS & GYNECOLOGY

## 2023-05-04 PROCEDURE — 3074F SYST BP LT 130 MM HG: CPT | Mod: CPTII,S$GLB,, | Performed by: OBSTETRICS & GYNECOLOGY

## 2023-05-04 PROCEDURE — 1159F PR MEDICATION LIST DOCUMENTED IN MEDICAL RECORD: ICD-10-PCS | Mod: CPTII,S$GLB,, | Performed by: OBSTETRICS & GYNECOLOGY

## 2023-05-04 PROCEDURE — 3078F DIAST BP <80 MM HG: CPT | Mod: CPTII,S$GLB,, | Performed by: OBSTETRICS & GYNECOLOGY

## 2023-05-04 PROCEDURE — 1160F PR REVIEW ALL MEDS BY PRESCRIBER/CLIN PHARMACIST DOCUMENTED: ICD-10-PCS | Mod: CPTII,S$GLB,, | Performed by: OBSTETRICS & GYNECOLOGY

## 2023-05-04 PROCEDURE — 3074F PR MOST RECENT SYSTOLIC BLOOD PRESSURE < 130 MM HG: ICD-10-PCS | Mod: CPTII,S$GLB,, | Performed by: OBSTETRICS & GYNECOLOGY

## 2023-05-04 RX ORDER — NORGESTIMATE AND ETHINYL ESTRADIOL 7DAYSX3 28
1 KIT ORAL DAILY
Qty: 84 TABLET | Refills: 4 | Status: SHIPPED | OUTPATIENT
Start: 2023-05-04

## 2023-05-04 NOTE — PROGRESS NOTES
"CC: Well woman exam    Sofi Vidal is a 37 y.o. female  presents for a well woman exam.  She has no issues, problems, or complaints.    Past Medical History:   Diagnosis Date    Abnormal cervical Papanicolaou smear 12    Colposcopy , HONEY 1    Anxiety     Childhood asthma     Cholelithiasis complicating pregnancy, antepartum        Past Surgical History:   Procedure Laterality Date    COLPOSCOPY      LASIK         OB History    Para Term  AB Living   1 1 1 0 0 1   SAB IAB Ectopic Multiple Live Births   0 0 0 0        # Outcome Date GA Lbr Mahin/2nd Weight Sex Delivery Anes PTL Lv   1 Term                Family History   Problem Relation Age of Onset    Anxiety disorder Mother     Cancer Father         prostate    Heart disease Sister         MVP    Allergy (severe) Son     Diabetes Maternal Aunt     Breast cancer Maternal Grandmother 68    Lung cancer Maternal Grandmother     Cancer Maternal Grandmother         breast, lung    Parkinsonism Paternal Grandmother     Colon cancer Neg Hx     Ovarian cancer Neg Hx        Social History     Tobacco Use    Smoking status: Never    Smokeless tobacco: Never   Substance Use Topics    Alcohol use: Yes     Comment: rare    Drug use: No       /68   Ht 5' 4" (1.626 m)   Wt 65.4 kg (144 lb 2.9 oz)   LMP 2023   BMI 24.75 kg/m²     ROS:  GENERAL: Denies weight gain or weight loss. Feeling well overall.   SKIN: Denies rash or lesions.   HEAD: Denies head injury or headache.   NODES: Denies enlarged lymph nodes.   CHEST: Denies chest pain or shortness of breath.   CARDIOVASCULAR: Denies palpitations or left sided chest pain.   ABDOMEN: No abdominal pain, constipation, diarrhea, nausea, vomiting or rectal bleeding.   URINARY: No frequency, dysuria, hematuria, or burning on urination.  REPRODUCTIVE: See HPI.   BREASTS: The patient performs breast self-examination and denies pain, lumps, or nipple discharge.   HEMATOLOGIC: No easy " bruisability or excessive bleeding.  MUSCULOSKELETAL: Denies joint pain or swelling.   NEUROLOGIC: Denies syncope or weakness.   PSYCHIATRIC: Denies depression, anxiety or mood swings.    Physical Exam:    APPEARANCE: Well nourished, well developed, in no acute distress.  AFFECT: WNL, alert and oriented x 3  SKIN: No acne or hirsutism  NECK: Neck symmetric without masses or thyromegaly  NODES: No inguinal, cervical, axillary, or femoral lymph node enlargement  CHEST: Good respiratory effect  ABDOMEN: Soft.  No tenderness or masses.  No hepatosplenomegaly.  No hernias.  BREASTS: Symmetrical, no skin changes or visible lesions.  No palpable masses, nipple discharge bilaterally.  PELVIC: Normal external genitalia without lesions.  Normal hair distribution.  Adequate perineal body, normal urethral meatus.  Vagina moist and well rugated without lesions or discharge.  Cervix pink, without lesions, discharge or tenderness.  No significant cystocele or rectocele.  Bimanual exam shows uterus to be normal size, regular, mobile and nontender.  Adnexa without masses or tenderness.    EXTREMITIES: No edema.      ASSESSMENT AND PLAN  1. Encounter for gynecological examination without abnormal finding  Liquid-Based Pap Smear, Screening      2. Encounter for screening mammogram for breast cancer        3. Breast cancer screening by mammogram  Mammo Digital Screening Bilat w/ Ameya      4. Encounter for contraceptive management, unspecified type  norgestimate-ethinyl estradioL (TRI-SPRINTEC, 28,) 0.18/0.215/0.25 mg-35 mcg (28) tablet          Patient was counseled today on A.C.S. Pap guidelines and recommendations for yearly pelvic exams, mammograms and monthly self breast exams; to see her PCP for other health maintenance.     Follow up in about 1 year (around 5/4/2024), or if symptoms worsen or fail to improve.

## 2023-05-08 ENCOUNTER — HOSPITAL ENCOUNTER (OUTPATIENT)
Dept: RADIOLOGY | Facility: HOSPITAL | Age: 38
Discharge: HOME | End: 2023-05-08
Attending: OBSTETRICS & GYNECOLOGY
Payer: COMMERCIAL

## 2023-05-08 ENCOUNTER — NUTRITION (OUTPATIENT)
Dept: NUTRITION | Facility: CLINIC | Age: 38
End: 2023-05-08

## 2023-05-08 VITALS — BODY MASS INDEX: 24.75 KG/M2 | HEIGHT: 64 IN | WEIGHT: 145 LBS

## 2023-05-08 DIAGNOSIS — Z12.31 BREAST CANCER SCREENING BY MAMMOGRAM: ICD-10-CM

## 2023-05-08 DIAGNOSIS — Z71.3 NUTRITIONAL COUNSELING: Primary | ICD-10-CM

## 2023-05-08 PROCEDURE — 77067 SCR MAMMO BI INCL CAD: CPT | Mod: 26,,, | Performed by: RADIOLOGY

## 2023-05-08 PROCEDURE — 99999 PR PBB SHADOW E&M-EST. PATIENT-LVL I: ICD-10-PCS | Mod: PBBFAC,,, | Performed by: DIETITIAN, REGISTERED

## 2023-05-08 PROCEDURE — 99499 NO LOS: ICD-10-PCS | Mod: CSM,,, | Performed by: DIETITIAN, REGISTERED

## 2023-05-08 PROCEDURE — 77063 MAMMO DIGITAL SCREENING BILAT WITH TOMO: ICD-10-PCS | Mod: 26,,, | Performed by: RADIOLOGY

## 2023-05-08 PROCEDURE — 99499 UNLISTED E&M SERVICE: CPT | Mod: CSM,,, | Performed by: DIETITIAN, REGISTERED

## 2023-05-08 PROCEDURE — 77067 MAMMO DIGITAL SCREENING BILAT WITH TOMO: ICD-10-PCS | Mod: 26,,, | Performed by: RADIOLOGY

## 2023-05-08 PROCEDURE — 99999 PR PBB SHADOW E&M-EST. PATIENT-LVL I: CPT | Mod: PBBFAC,,, | Performed by: DIETITIAN, REGISTERED

## 2023-05-08 PROCEDURE — 77063 BREAST TOMOSYNTHESIS BI: CPT | Mod: 26,,, | Performed by: RADIOLOGY

## 2023-05-08 PROCEDURE — 77067 SCR MAMMO BI INCL CAD: CPT | Mod: TC

## 2023-05-08 NOTE — PROGRESS NOTES
"Nutrition Assessment for Follow up Medical Nutrition Therapy-- 12 week protocol $575      Reason for MNT visit: Pt in for education and nutrition counseling regarding  longevity, desired weight loss, feel better .       ASSESSMENT    Age: 37 y.o.  Wt: 145.9 lbs (86.2 lbs lean body mass, 46.3 lbs skeletal muscle mass, 59.7 lbs fat mass, visceral fat level 15)  147.3 lbs (89.5 lbs lean body mass, 48.7 lbs skeletal muscle mass, 57.8 lbs fat mass visceral fat level 13)  147 lbs (decrease 3 lbs fat, increase 2 lbs muscle mass)  148.1 lbs (86.9 lbs lean mass, 46.7 lbs skeletal muscle mass, 61.2 lbs fat mass, visceral fat level 14)  REE: 1300  Wt Readings from Last 1 Encounters:   05/04/23 65.4 kg (144 lb 2.9 oz)     Ht:   Ht Readings from Last 1 Encounters:   05/04/23 5' 4" (1.626 m)     BMI:   BMI Readings from Last 1 Encounters:   05/04/23 24.75 kg/m²       Clinical signs/symptoms: weight gain over 1.5 years, high cholesterol    Medical History:   Past Medical History:   Diagnosis Date    Abnormal cervical Papanicolaou smear 2011, 11-19-12    Colposcopy , HONEY 1    Anxiety     Childhood asthma     Cholelithiasis complicating pregnancy, antepartum      Problem List             Resolved    Cholelithiasis: seen on u/s 2014         Anxiety and depression         Acne         Iron deficiency         Vitamin D insufficiency         Mixed hyperlipidemia         Renal stone: R non obstructing 2/22         B12 deficiency            Medications:   Current Outpatient Medications:     cholecalciferol, vitamin D3, (VITAMIN D3) 25 mcg (1,000 unit) capsule, Take 2 capsules (2,000 Units total) by mouth once daily., Disp: 180 capsule, Rfl: 3    cyanocobalamin (VITAMIN B-12) 1000 MCG tablet, Take 1 tablet (1,000 mcg total) by mouth once daily., Disp: 30 tablet, Rfl: 12    ferrous sulfate (FEOSOL) 325 mg (65 mg iron) Tab tablet, Take 1 tablet (325 mg total) by mouth 3 (three) times a week., Disp: 36 tablet, Rfl: 3    LORazepam (ATIVAN) 0.5 " MG tablet, Take 1 tablet (0.5 mg total) by mouth daily as needed for Anxiety., Disp: 30 tablet, Rfl: 0    norgestimate-ethinyl estradioL (TRI-SPRINTEC, 28,) 0.18/0.215/0.25 mg-35 mcg (28) tablet, Take 1 tablet by mouth once daily., Disp: 84 tablet, Rfl: 4    Current Facility-Administered Medications:     cyanocobalamin injection 1,000 mcg, 1,000 mcg, Intramuscular, Q14 Days, Angelica Marin MD, 1,000 mcg at 05/03/23 0940    Supplements: B12 supplements and B12 injections    Food allergies  intolerances: NKFA, her son does have tree nuts and soy allergy    Labs:  Reviewed  Hemoglobin A1C   Date Value Ref Range Status   02/17/2022 4.9 4.0 - 5.6 % Final     Comment:     ADA Screening Guidelines:  5.7-6.4%  Consistent with prediabetes  >or=6.5%  Consistent with diabetes    High levels of fetal hemoglobin interfere with the HbA1C  assay. Heterozygous hemoglobin variants (HbS, HgC, etc)do  not significantly interfere with this assay.   However, presence of multiple variants may affect accuracy.     12/13/2017 4.8 4.0 - 5.6 % Final     Comment:     According to ADA guidelines, hemoglobin A1c <7.0% represents  optimal control in non-pregnant diabetic patients. Different  metrics may apply to specific patient populations.   Standards of Medical Care in Diabetes-2016.  For the purpose of screening for the presence of diabetes:  <5.7%     Consistent with the absence of diabetes  5.7-6.4%  Consistent with increasing risk for diabetes   (prediabetes)  >or=6.5%  Consistent with diabetes  Currently, no consensus exists for use of hemoglobin A1c  for diagnosis of diabetes for children.  This Hemoglobin A1c assay has significant interference with fetal   hemoglobin   (HbF). The results are invalid for patients with abnormal amounts of   HbF,   including those with known Hereditary Persistence   of Fetal Hemoglobin. Heterozygous hemoglobin variants (HbAS, HbAC,   HbAD, HbAE, HbA2) do not significantly interfere with this assay;    however, presence of multiple variants in a sample may impact the %   interference.       Cholesterol   Date Value Ref Range Status   02/17/2022 239 (H) 120 - 199 mg/dL Final     Comment:     The National Cholesterol Education Program (NCEP) has set the  following guidelines (reference ranges) for Cholesterol:  Optimal.....................<200 mg/dL  Borderline High.............200-239 mg/dL  High........................> or = 240 mg/dL     09/24/2019 219 (H) 120 - 199 mg/dL Final     Comment:     The National Cholesterol Education Program (NCEP) has set the  following guidelines (reference ranges) for Cholesterol:  Optimal.....................<200 mg/dL  Borderline High.............200-239 mg/dL  High........................> or = 240 mg/dL     01/26/2018 188 <200 mg/dL Final     Triglycerides   Date Value Ref Range Status   02/17/2022 143 30 - 150 mg/dL Final     Comment:     The National Cholesterol Education Program (NCEP) has set the  following guidelines (reference values) for triglycerides:  Normal......................<150 mg/dL  Borderline High.............150-199 mg/dL  High........................200-499 mg/dL     09/24/2019 68 30 - 150 mg/dL Final     Comment:     The National Cholesterol Education Program (NCEP) has set the  following guidelines (reference values) for triglycerides:  Normal......................<150 mg/dL  Borderline High.............150-199 mg/dL  High........................200-499 mg/dL     01/26/2018 50 <150 mg/dL Final     HDL   Date Value Ref Range Status   02/17/2022 75 40 - 75 mg/dL Final     Comment:     The National Cholesterol Education Program (NCEP) has set the  following guidelines (reference values) for HDL Cholesterol:  Low...............<40 mg/dL  Optimal...........>60 mg/dL     09/24/2019 90 (H) 40 - 75 mg/dL Final     Comment:     The National Cholesterol Education Program (NCEP) has set the  following guidelines (reference values) for HDL  Cholesterol:  Low...............<40 mg/dL  Optimal...........>60 mg/dL     01/26/2018 83 >50 mg/dL Final     LDL Cholesterol   Date Value Ref Range Status   02/17/2022 135.4 63.0 - 159.0 mg/dL Final     Comment:     The National Cholesterol Education Program (NCEP) has set the  following guidelines (reference values) for LDL Cholesterol:  Optimal.......................<130 mg/dL  Borderline High...............130-159 mg/dL  High..........................160-189 mg/dL  Very High.....................>190 mg/dL     09/24/2019 115.4 63.0 - 159.0 mg/dL Final     Comment:     The National Cholesterol Education Program (NCEP) has set the  following guidelines (reference values) for LDL Cholesterol:  Optimal.......................<130 mg/dL  Borderline High...............130-159 mg/dL  High..........................160-189 mg/dL  Very High.....................>190 mg/dL     01/26/2018 92 mg/dL (calc) Final     Comment:     Reference range: <100     Desirable range <100 mg/dL for patients with CHD or  diabetes and <70 mg/dL for diabetic patients with  known heart disease.     LDL-C is now calculated using the Renee   calculation, which is a validated novel method providing   better accuracy than the Friedewald equation in the   estimation of LDL-C.   Rashi FERNANDEZ et al. NELSY. 2013;310(19): 0870-3420   (http://education.Sym[employer].com/faq/QZW581)       HDL/Cholesterol Ratio   Date Value Ref Range Status   02/17/2022 31.4 20.0 - 50.0 % Final   09/24/2019 41.1 20.0 - 50.0 % Final   01/26/2018 2.3 <5.0 (calc) Final     Non-HDL Cholesterol   Date Value Ref Range Status   02/17/2022 164 mg/dL Final     Comment:     Risk category and Non-HDL cholesterol goals:  Coronary heart disease (CHD)or equivalent (10-year risk of CHD >20%):  Non-HDL cholesterol goal     <130 mg/dL  Two or more CHD risk factors and 10-year risk of CHD <= 20%:  Non-HDL cholesterol goal     <160 mg/dL  0 to 1 CHD risk factor:  Non-HDL cholesterol  goal     <190 mg/dL     09/24/2019 129 mg/dL Final     Comment:     Risk category and Non-HDL cholesterol goals:  Coronary heart disease (CHD)or equivalent (10-year risk of CHD >20%):  Non-HDL cholesterol goal     <130 mg/dL  Two or more CHD risk factors and 10-year risk of CHD <= 20%:  Non-HDL cholesterol goal     <160 mg/dL  0 to 1 CHD risk factor:  Non-HDL cholesterol goal     <190 mg/dL     01/05/2017 109 mg/dL Final     Comment:     Risk category and Non-HDL cholesterol goals:  Coronary heart disease (CHD)or equivalent (10-year risk of CHD >20%):  Non-HDL cholesterol goal     <130 mg/dL  Two or more CHD risk factors and 10-year risk of CHD <= 20%:  Non-HDL cholesterol goal     <160 mg/dL  0 to 1 CHD risk factor:  Non-HDL cholesterol goal     <190 mg/dL       Non HDL Chol. (LDL+VLDL)   Date Value Ref Range Status   01/26/2018 105 <130 mg/dL (calc) Final     Comment:     For patients with diabetes plus 1 major ASCVD risk   factor, treating to a non-HDL-C goal of <100 mg/dL   (LDL-C of <70 mg/dL) is considered a therapeutic   option.       Vitamin B-12   Date Value Ref Range Status   02/28/2023 411 210 - 950 pg/mL Final   12/21/2022 241 210 - 950 pg/mL Final   12/21/2022 185 180 - 914 ng/L Final     Comment:     B-12 <400; MMA test was performed.    Test Performed by:  [facility]  [address] [place] [address]  : Moises Lamas M.D. Ph.D.; CLIA# [id number]       TSH   Date Value Ref Range Status   10/19/2022 1.525 0.400 - 4.000 uIU/mL Final         Typical day recall: 5/8/2023- Reviewed and noted during consultation. Pt apologized for having to cancel the last few visits. Sofi's son has been struggling with his different food allergies and sensitivities. It has put a strain on Sofi's wellbeing. Discussed putting nutrition and fueling her body back as a priority. Reviewed that Nutrition is the center of having good wellbeing-- pt stated  that her sleep is disrupted, lower energy, more emotional. She has been doing well with what she can, pt has seen slight variations with her body composition.    2/9/2023- Reviewed and noted during consultation. Pt has stayed consistent with her workout regimen. She has worked it into her daily routine after bringing her son to school. She is concerned about when her son has off for [place]. Discussed making a plan with one of the other moms that her son is friends with and arrange for him to have a play date while she goes to the gym. Pt also has a challenge of not always planning ahead. Discussed having more accountability of emailing weekly her meal schedule so she is prepared with purchasing food items needed to create new recipes.    1/19/2023- Reviewed and noted during consultation. Pt noticed since our last visit she does not tell herself no. Pt reported that if she has a craving she goes and gets it. She is starting to be more mindful of what she is consuming. Discussed optimizing her meals to work for her. Reviewed meal plan. Pt has been consistent with her workout routine. Pt did report being constipated. She is leaving for a cruise tomorrow. Pt determined to still make progress.    Reviewed and noted during consultation. Pt reported practicing intermittent fasting most days. She chooses nutrient dense foods. Pt reports that lately she may be over eating or emotional eating and eating higher quantities. Discussed finding balance on her plate and noticing fullness cues.    Beverages: Nespresso and almond milk, 3 bottles of water    Dining out: Regular, 4-6 times per week    Alcohol: nonsmoker, PT reports drinking 1-2 drinks every other weekend    Lifestyle Influences  Support System: Self, family, 7 year old son, pt  passed away in 2016 to cancer    Meal preparation/shopping: self    Current Activity Level: 3 days a week low impact on treadmill, open to working with a .    Patient  motivation, anticipated barriers, expected compliance: Patient is motivated and has verbalized understanding and intent to comply    DIAGNOSIS   Problem: Overweight  Etiology: RT increase caloric intake  Signs/Symptoms: AEB food recall, BMI >25    INTERVENTION  Nutrition prescription: estimated energy requirements:   Calories: 1400  Protein: 105-120 g  Carbohydrates: 120-135 g  Focus on plant-based, heart healthy fats  Total Fat: 45-55 g  Baseline for fluids: 75 fl ounces + sweat loss    Recommendations & Goals:  Patient goals and recommendations are tailored to the specific patient's needs, readiness to change, lifestyle, culture, skills, resources, & abilities. Strategies to help achieve these nutrition-related goals were discussed which can include but are not limited to SMART goal setting & mindful eating.     Aim for a minimum of 7 hours sleep   Exercise 60 minutes most days  Eat breakfast within 1-2 hours of waking up  Try not to skip any meals or snacks, not going more than 3-4 hours without eating.   At each meal and snack, try to include a source of fiber + lean protein + healthy fat.     Written Materials Provided  These resources are intended to assist the patient in making it easier to choose recommended options when eating out & to identify better-for-you brands at the grocery store:    Meal Planning Guide with recommendations discussed along with portion sizes and a customized meal plan   Eat Fit pilo card as a reminder to download the pilo to ones smart phone which provides: current Eat Fit partners, approved menu options, food labels for carb counting, & recipes   On-the-Go Food Guide  Brand Specific Eat Fit Grocery Product list   RD contact information- for patient to contact regarding any questions, needs, and/or concerns that may arise     MONITORING & EVALUATION    Communicated with healthcare provider    Documented plan for referral to appropriate agency/healthcare provider as  needed    Comprehension: good     Motivation to change: high    Follow-up: Yes, in 4 weeks    Counseling time: 1 Hours

## 2023-05-08 NOTE — PATIENT INSTRUCTIONS
Name: Sofi Vidal  Date: 1/19/2023    Daily Energy Requirements:  Calories: 1400   Protein: 105-120 g   Carbohydrates:  120-135 g  Total Fats: 45-50 g  Focus on Heart Healthy Fats  Limit Saturated fat to 15 g  Fluid: 75 fl ounces + sweat loss   Limit Added sugar to 20 g    Wake6:30am    Water + coffee    Workout 8:00am-9:00am    Breakfast: 9:30am (on workout days)   ~300 calories: 3-4 ounces Protein (21-28 g) + 2-3 servings (30-45 g) of Carbohydrates + heart healthy fats + unlimited non-starchy vegetables   Breakfast option 1: Greek Yogurt Bowl  ¼ cup berries or 1 serving of fruit, sliced  7 ounces of Plain 2% Greek yogurt   1 Tbsp diana seeds  Additional flavor pairings: vanilla extract, lime/lemon juice  Breakfast option 2: Protein Shake  1 scoop protein powder  8-10 ounces Unsweetened [place] milk/soy milk  2 servings of fruit  Smoothie recipes attached  Breakfast option 3: Avocado Toast  1 slice 100% whole wheat bread    ¼ medium avocado or ½ small avocado  4 slices all natural turkey díaz  1 serving fruit  Breakfast option 4: Bagel + light cream cheese  Perfect 10+ Bagel (only available online)  3 Tbsp (or less) of light cream cheese or 2 Tbsp peanut butter  Breakfast option 5: Homemade Eat Fit Berry Blast Protein Smoothie  1 teaspoon Swerve sweetener  2 ounces (1/4 cup) frozen blueberries  8 ounces of unsweetened vanilla almond milk (or you can do 2% Milk)  4 ounces (1/2 cup) frozen strawberries  ¼ teaspoon vanilla extract  1 scoop protein powder  ½-1 teaspoon beet powder   Blend together until smooth  Breakfast option 6: Overnight Oats  4 servings  1 cup Catholic Oats, uncooked  2 Tbsp Diana seeds  Add 2 scoops of protein powder   2% Fairlife milk or unsweetened almond milk (enough to cover the oats to absorb the liquid)  2 Tbsp peanut/nut butter or PB2 powder  Add pumpkin puree/ vanilla extract / berries to help flavor naturally / cinnamon  Agave 5 for sweetener (optional)  Breakfast option 7:  Egg Muffins + Greek yogurt + fruit  2 egg muffins  1 serving of Oikos triple zero Greek yogurt//Fage Greek Yogurt (2%)  1 serving of fruit  Breakfast option 8: Scrambled Eggs and toast  2 egg   1 ounce Reduced fat cheese OR ¼ medium avocado  Unlimited non-starchy vegetables (onion, bell pepper, mushroom, etc)  2 tbsp salsa (optional)  1 slice 100% whole wheat toast OR 1 slice Sagar raisin bread  Breakfast option 9: hard boiled eggs, berries + sausage  2 hard boiled eggs  ½ cup berries  1 serving Cassy chicken/turkey sausage   Breakfast option 10: Cereal  Cereal  1 cup Orgain's Unsweetened [place] milk OR premade protein shake OR 1 cup 1-2% Milk  1 serving Kashi GoLean Cereal OR Special K Protein Plus OR Esmer's Naturals OR Protein Cheerios, Nature's own optimum power blueberry cinnamon flax  If needed add ¼ - ½ cup of your cereal mixed in  ½-1  scoop of protein powder ( whey, Baugh Plant based protein, or unflavored collagen etc)        Lunch:  11:00am/12:00pm/1:30pm (this may be a bit bigger if you did not have breakfast)  ~300-400 calories: 4-5 ounces Protein (28-35 g) + 2 servings (30 g) of Carbohydrates + heart healthy fats + Unlimited Non-starchy vegetables   Lunch option 1: Salad  4-5 ounces of lean protein (chicken, salmon, turkey, etc)  2 cups dark leafy green mixture (spring mix, kale, arugula, mixed with tomy lettuce)  ½ cup of starch/grain (cubed sweet potatoes, brown rice, etc)  ¼ cup cooked beans (chickpea, black beans, kidney beans etc)  ½ cup berries or fruit, unsweetened dried (2 Tbsp) or fresh  Unlimited non-starchy vegetables  1 ounce dressing (lightly coat the lettuce) Edward's is a good brand  Lunch option 2:  Tacos  2 corn/carb balance tortilla  4-5 ounces lean protein  Shredded lettuce or cabbage  1 ounce cheese  ½ medium avocado  ½ cup Black beans  Salsa to taste  Lunch option 3:  4-5 ounces of lean protein  At least ½ cup cooked non-starchy vegetables (sautéed/baked with olive  oil)  ½ -1 cup sweet potato or medium % whole wheat serving  Lunch option 4: Chicken Salad + crackers + non-starchy veg + fruit  ½ cup chicken salad (Mo's or homemade)  1 serving 100% whole wheat or seed crackers ( Triscuits, Amanda's gone crackers, or recipe linked etc)  1 cup non-starchy vegetables (carrots, bell peppers, cucumbers, etc.)  1 serving of fruit (sliced apple, berries, grapes)  Lunch option 5: [place]   [place]   3-4 ounces of lean meat (chosen from meal planning guide--ex. Grilled chicken, turkey breast luncheon meat, chicken/tuna salad)   2 slices of 100% whole wheat bread (ex. 45 jarrell-nature valley 100% whole wheat, lucas's killer bread thin slices)  Dressed with lettuce, sliced tomato, pickles, 1 Tbsp Lawrence + Mustard  At least ½ cup cooked non-starchy vegetables OR at least 1 cup raw vegetables      Always snack:  Unlimited amounts of non-starchy vegetables    Snack:  3:00pm/4:00pm (if going longer than 3-4 hours between meals)  ~100-200 calories: 2-3 ounces Protein (14-21 g) + 1 servings (15-20 g) of Carbohydrates + heart healthy fats + unlimited non-starchy vegetables  Select 1 from the list--All separate options   2 ounce cheese (2 baby bel light cheese circles, light laughing cow triangles) + 1 piece of fruit  1 ounce nuts + 1 piece of fruit   ¼ cup chicken salad + 1 serving whole wheat or seed crackers  1 siggi's no sugar added yogurt + 1 ounce of nuts  ½ cup Hummus + 1 cup mixed raw vegetables for dipping  1/3 cup Plain Greek yogurt flavored with ½ cup sliced fruit + ¼ cup granola + 1 tsp vanilla extract + ½ Tbsp diana or ground flax seeds  Protein bar (Oatmega, Powercrunch, Nature Valley Protein Bar, Kind Protein Bar, No Cow Bar)  Protein shake (Iconic, Premier, Orgain, muscle milk light)  1 cup bell peppers OR 1 serving beanitos + ½ cup guacamole   6 Triscuits, spread of goat cheese topped with a slice of smoked salmon  1-2 tbsp Peanut butter + 1 Apple  1 Tbsp Peanut butter + celery  sticks  ½ cup cottage cheese + 1 serving of fruit/tomatoes  Carrots + plain Greek yogurt + ranch packet      Dinner: 6:30pm/7:00pm   ~300-400 calories: 4-5 ounces of Protein (28-35 g) + Unlimited Non-Starchy Vegetables + 1 Servings of Carbohydrates (15-20 g) + heart healthy fats  Dinner option 1:  4-5 ounces baked or broiled Fish OR Shrimp/Seafood  1 cup (or more) non-starchy vegetables from meal plan guide book cooked with 1 tbsp olive oil  1/2 medium sweet potato OR  1 serving 100% whole wheat option from meal planning guide book  Dinner option 2:  4-5 ounces of lean protein (ex. pork loin, fillet, sirloin, pork chops, chicken without skin)  1 cup cooked non-starchy vegetables  1 serving of 100% whole wheat OR 1 serving of starchy vegetable from meal planning guide book  Dinner option 3:  Spaghetti and meat balls  ¼ cup cooked Banza chickpea pasta / lentil pasta  Unlimited zoodles/ palmini (hearts of palm pasta)  ½ cup marinara sauce  2-3, 2-ounce sized meatballs  Unlimited non starchy vegetables (green beans, brussels, asparagus, cauliflower)  Dinner option 4:  Nachos  1 servings, 11 Beanitos chips  1 ounce of cheddar cheese  3-4 ounces of lean protein (shrimp, shredded chicken, flank steak)  Add-ons: chives, jalapenos, cilantro, salsa  Dinner option 5:  1/2 cup Bean or lentil soup   2-3 ounces lean protein  Unlimited non-starchy vegetables   Dinner option 6:  Any lunch option can be a dinner option  Dinner option 7:  Out to eat, refer to the fueling well on the go guide and the restaurant tips below to choose better-for-you options      **If craving something sweet, as a sometimes treat, try one of these options:  ~100 calories:  ¾ cup (frozen) mixed berries with 1-2 Tbsp coconut whip cream   1 swerve chocolate chip cookie or other Swerve recipes  1 serving Halo Top Ice Cream  A square of Parul's Chocolate  Peanut butter whip--light whip cream, PB2 powder, collagen/ protein powder, SF pudding packet  ½ cup plain  Greek yogurt mix in 1 tbsp SF pudding mix--freeze 1+ hour(s)   Protein shake, put in freezer for 30 minutes to get ice cold  Dole dippers      **Try to be finished eating by 7:30pm    Bed time routine, relaxing the body (breathing exercises, meditation, reading)    Bed 10:30pm   Additional Notes:  Incorporate a source of lean protein, fiber, and heart healthy fat with each meal.   These three nutrients take the longest to digest, so we feel full longer and it helps not spike our blood glucose levels.  How to Read a Label:  Ingredients first: ingredients are listed from most to least, what is at the top is most occurring  Serving Size: this is the amount that the numbers represent. If you eat less, divide, if you eat more multiply the numbers  Added sugar: Added sugar will be listed under carbohydrates and total sugar. Per product/meal you want your added sugar to be below 7 grams, ideally not more than 20 grams for your whole day  Ensure Balance: Add up the grams from fiber, total fat and protein = x, compare total of Fiber + Fat + Protein to the total carbohydrates.   Looking for a 1:1 ratio or higher fiber, fat, and protein than total carbohydrates.  Restaurant Tips:   Enjoy lean proteins such as fish, shellfish, skinless poultry, pork loin, filet or beans.   Select items grilled, baked, broiled, braised, poached or roasted.   Try an appetizer-sized entrée or split a regular-sized entrée with a friend.   Avoid crispy, crunchy, breaded, paneed or stuffed items.   Request dishes to be prepared without added fats (oils, butter).   Avoid items that are cream-based, au gratin or buttered.   Order sauces, dressings and gravies on the side.   Instead of an appetizer, bread (or chips), alcoholic drink and dessert, choose just one to have with your entrée.    Don't drink your calories: avoid sugary soft drinks, juices and mixers.   Limit high calorie salad additives like cheese, croutons, avocado and díaz -  choose just one to two (see below under salad fats for more examples).   Hold the starchy side dish - request extra vegetables instead.   Order vegetables steamed or stir-fried instead of sautéed to avoid excess oils. Ask for olive oil on the side and drizzle over veggies.  Eating out in [place]:  [email or web address]  Waitr has Eat Fit options  Uber Eats has Eat Fit options  [facility] Eat Fit pilo (Free pilo for smartphones)  List of all Eat Fit restaurants  See what dishes are Eat Fit at each restaurant  See the nutrition facts for each Eat Fit dish  Provides >200 Eat Fit recipes  Fast Food Lite Guide  Salad Fats: Pick 2, each being 2 Tbsp:  Dressing  Cheese  Nuts  [place]  Avocado (1/2 of a small avocado okay)  Guacamole  Sour cream  Egg (1 okay)    Aisle products: Think Valente #7 à Look for products that have <7 grams sugar, and at least 7 grams or more protein  -Choose 100% whole wheat/whole grain products (Essentially, 'wheat' bread and 'wheat' flour mean white bread/flour)  -4 things that cause inflammation to our body:  White/refined carbohydrates  Sugar  Alcohol  Fried foods  How to make a healthy smoothie:  Choose a protein source  Plain Greek yogurt or 2% cottage cheese  Examples of plant-based protein powders:  Keri  [facility]  Baugh  Garden of Life RAW  100% whey protein powder  Add a piece of fruit -or- 1 cup chopped fresh or frozen unsweetened fruit  Choose a fat source:  Peanut Butter  Avocado  Extra Virgin Olive Oil  Coconut Oil   Choose a liquid:  Unsweetened almond milk  hemp milk  cashew milk, etc  Fairlife milk (skim, 1% or 2%)  Water   Healthy add-ins for a boost:  1 Tbsp flaxseeds or diana seeds  1 Tbsp Vital protein collagen peptides  Add ice and blend!   To step it up a notch, use frozen PLAIN cauliflower florets in place of ice to provide more nutrients  Top food brands:  100% whole grain chips:  SunChips  Beanito's  Beanfields  100% whole grain crackers:  Triscuits  Blue Suri almond  nut thins  Amanda's Gone crackers  Wheat thins  100% whole grain breads:  Alvaro's Killer 21 whole grains and seeds  Alvaro's Killer Powerseed  Nature's Own 100% whole wheat  100% whole grain pancake mix:  [place] cake 100% whole grain pancake mix  Whole grain tortillas/wraps:  [place]   La Tortilla Factory 50-calorie low carb high fiber wrap  Coconut wrap (typically found in refrigerated section by cheese)  ROBERTO CARLOS YOLIS 100% whole wheat 6  Better-for-you pasta:  Banza chickpea  Red Lentil  Black bean pasta (aka squid ink pasta)  [place] gluten free quinoa pasta (found on gluten free aisle. This brand tastes like white pasta, but is a whole grain)  Lower sugar red sauce in a jar:  Elliott & Yolanda's Heart Smart (this one is also lower in sodium)  Classico Original  Plant Strong Engine 2  100% whole grain frozen waffles:  Off the Grid   VAN's power grains or flax  Kashi 7 grain  100% whole grain cereals   Special K PROTEIN  Nutritious Livings Hi-Lo  Esmer's Natural's (online only)  Kashi GoLEAN  Better-for-you ice cream/ice cream bars:  Halo Top high protein ice cream pints  Halo Top 'pops'  Enlightened ice cream bar  Yasso Frozen Greek yogurt bar  Favorite brands of lactose free milk that contain half the sugar + double the protein and lasts ~2 months in your fridge compared to regular milk:  Fairlife milk  (1% or 2%)  Organic Valley ULTRA reduced fat milk   Flavored Greek yogurt:  Chobani Less sugar  Oikos Triple Zero  Two Good  Siggi's no added sugar  Supplements:  Multi Vitamin  One a Day  Fish oil (pick one)  = 2800 mg EPA and DHA + 1,000 IU Vitamin D    (liquid or capsules) = 1600 mg EPA and DHA  Vitamin D3  1,25-Dihydroxycholecalciferol   1,000 -5,000 IU (You have a low Vitamin D level)  Doctor prescribed for more than 5,000 IU  Probiotic  When choosing a probiotic, look for ones that contains at least 30 billion colony forming units (CFU) and at least 10 bacteria strains (preferred strains are between 10-30  strains)  Brand examples:  Jarrow Formulas (refrigerated)  Garden of Life  MegaFood  Nature Made  [employer]co brand  Nature Way (also called Alive)

## 2023-05-10 LAB
CLINICAL INFO: NORMAL
CYTO CVX: NORMAL
CYTOLOGIST CVX/VAG CYTO: NORMAL
CYTOLOGIST CVX/VAG CYTO: NORMAL
CYTOLOGY CMNT CVX/VAG CYTO-IMP: NORMAL
CYTOLOGY PAP THIN PREP EXPLANATION: NORMAL
DATE OF PREVIOUS PAP: NO
DATE PREVIOUS BX: NO
GEN CATEG CVX/VAG CYTO-IMP: NORMAL
LMP START DATE: NORMAL
MICROORGANISM CVX/VAG CYTO: NORMAL
PATHOLOGIST CVX/VAG CYTO: NORMAL
SERVICE CMNT-IMP: NORMAL
SPECIMEN SOURCE CVX/VAG CYTO: NORMAL
STAT OF ADQ CVX/VAG CYTO-IMP: NORMAL

## 2023-05-15 ENCOUNTER — OFFICE VISIT (OUTPATIENT)
Dept: DERMATOLOGY | Facility: CLINIC | Age: 38
End: 2023-05-15
Payer: COMMERCIAL

## 2023-05-15 DIAGNOSIS — D22.9 MULTIPLE BENIGN NEVI: ICD-10-CM

## 2023-05-15 DIAGNOSIS — Z12.83 SCREENING EXAM FOR SKIN CANCER: ICD-10-CM

## 2023-05-15 DIAGNOSIS — L73.8 SEBACEOUS GLAND HYPERPLASIA: ICD-10-CM

## 2023-05-15 DIAGNOSIS — L81.4 LENTIGO: ICD-10-CM

## 2023-05-15 DIAGNOSIS — L82.1 SK (SEBORRHEIC KERATOSIS): Primary | ICD-10-CM

## 2023-05-15 DIAGNOSIS — L72.0 MILIA: ICD-10-CM

## 2023-05-15 PROCEDURE — 99999 PR PBB SHADOW E&M-EST. PATIENT-LVL III: CPT | Mod: PBBFAC,,, | Performed by: DERMATOLOGY

## 2023-05-15 PROCEDURE — 1160F PR REVIEW ALL MEDS BY PRESCRIBER/CLIN PHARMACIST DOCUMENTED: ICD-10-PCS | Mod: CPTII,S$GLB,, | Performed by: DERMATOLOGY

## 2023-05-15 PROCEDURE — 99213 PR OFFICE/OUTPT VISIT, EST, LEVL III, 20-29 MIN: ICD-10-PCS | Mod: S$GLB,,, | Performed by: DERMATOLOGY

## 2023-05-15 PROCEDURE — 1160F RVW MEDS BY RX/DR IN RCRD: CPT | Mod: CPTII,S$GLB,, | Performed by: DERMATOLOGY

## 2023-05-15 PROCEDURE — 99213 OFFICE O/P EST LOW 20 MIN: CPT | Mod: S$GLB,,, | Performed by: DERMATOLOGY

## 2023-05-15 PROCEDURE — 1159F MED LIST DOCD IN RCRD: CPT | Mod: CPTII,S$GLB,, | Performed by: DERMATOLOGY

## 2023-05-15 PROCEDURE — 1159F PR MEDICATION LIST DOCUMENTED IN MEDICAL RECORD: ICD-10-PCS | Mod: CPTII,S$GLB,, | Performed by: DERMATOLOGY

## 2023-05-15 PROCEDURE — 99999 PR PBB SHADOW E&M-EST. PATIENT-LVL III: ICD-10-PCS | Mod: PBBFAC,,, | Performed by: DERMATOLOGY

## 2023-05-15 NOTE — PROGRESS NOTES
Subjective:      Patient ID:  Sofi Vidal is a 37 y.o. female who presents for   Chief Complaint   Patient presents with    Excessive Sweating    Skin Check     TBSE    Lesion     L cheek     History of Present Illness: The patient presents for follow up of skin check.    The patient was last seen on: 12/23/21 for TBSE and IL K 10 to acne nodule on right preauricular which has since resolved.  No h/o nmsc or mm    Other skin complaints:   Patient with new complaint of lesion(s)  Location: post neck  Duration: 2-3 years  Symptoms: raised  Relieving factors/Previous treatments: none    Patient with new complaint of lesion(s)  Location: L cheek  Duration: 1 year  Symptoms: bigger  Relieving factors/Previous treatments: none    Pt also presents for excessive sweating in both axilla x 1 year. Pt gas tried multiple OTC products which have not helped.      Review of Systems   Skin:  Positive for daily sunscreen use and activity-related sunscreen use. Negative for recent sunburn.   Hematologic/Lymphatic: Does not bruise/bleed easily.     Objective:   Physical Exam   Constitutional: She appears well-developed and well-nourished. No distress.   Neurological: She is alert and oriented to person, place, and time. She is not disoriented.   Psychiatric: She has a normal mood and affect.   Skin:   Areas Examined (abnormalities noted in diagram):   Scalp / Hair Palpated and Inspected  Head / Face Inspection Performed  Neck Inspection Performed  Chest / Axilla Inspection Performed  Abdomen Inspection Performed  Genitals / Buttocks / Groin Inspection Performed  Back Inspection Performed  RUE Inspected  LUE Inspection Performed  RLE Inspected  LLE Inspection Performed  Nails and Digits Inspection Performed                   Diagram Legend     Erythematous scaling macule/papule c/w actinic keratosis       Vascular papule c/w angioma      Pigmented verrucoid papule/plaque c/w seborrheic keratosis      Yellow umbilicated papule  c/w sebaceous hyperplasia      Irregularly shaped tan macule c/w lentigo     1-2 mm smooth white papules consistent with Milia      Movable subcutaneous cyst with punctum c/w epidermal inclusion cyst      Subcutaneous movable cyst c/w pilar cyst      Firm pink to brown papule c/w dermatofibroma      Pedunculated fleshy papule(s) c/w skin tag(s)      Evenly pigmented macule c/w junctional nevus     Mildly variegated pigmented, slightly irregular-bordered macule c/w mildly atypical nevus      Flesh colored to evenly pigmented papule c/w intradermal nevus       Pink pearly papule/plaque c/w basal cell carcinoma      Erythematous hyperkeratotic cursted plaque c/w SCC      Surgical scar with no sign of skin cancer recurrence      Open and closed comedones      Inflammatory papules and pustules      Verrucoid papule consistent consistent with wart     Erythematous eczematous patches and plaques     Dystrophic onycholytic nail with subungual debris c/w onychomycosis     Umbilicated papule    Erythematous-base heme-crusted tan verrucoid plaque consistent with inflamed seborrheic keratosis     Erythematous Silvery Scaling Plaque c/w Psoriasis     See annotation      Assessment / Plan:        SK (seborrheic keratosis)   - minor problem and chronic.   Reassurance given to patient. No treatment necessary.     Multiple benign nevi   - minor problem and chronic.   Reassurance given to patient. No treatment necessary.   Patient with 1 mildly atypical nevi. Instructed patient to observe lesion(s) for changes and follow up in clinic if changes are noted. Discussed ABCDE's of moles and brochure provided.    Lentigo   - minor problem and chronic.   Reassurance given to patient. No treatment necessary.     Milia   - minor problem and chronic.   Reassurance given to patient. No treatment necessary.     Sebaceous gland hyperplasia   - minor problem and chronic.   Reassurance given to patient. No treatment necessary.     Screening exam  for skin cancer  Total body skin examination performed today including at least 12 points as noted in physical examination. No lesions suspicious for malignancy noted.    Recommend daily sun protection/avoidance, use of at least SPF 30, broad spectrum sunscreen (OTC drug), skin self examinations, and routine physician surveillance to optimize early detection             Follow up if symptoms worsen or fail to improve.

## 2023-05-24 ENCOUNTER — CLINICAL SUPPORT (OUTPATIENT)
Dept: INTERNAL MEDICINE | Facility: CLINIC | Age: 38
End: 2023-05-24
Payer: COMMERCIAL

## 2023-05-24 DIAGNOSIS — E53.8 B12 DEFICIENCY: Primary | ICD-10-CM

## 2023-05-24 PROCEDURE — 96372 PR INJECTION,THERAP/PROPH/DIAG2ST, IM OR SUBCUT: ICD-10-PCS | Mod: S$GLB,,, | Performed by: INTERNAL MEDICINE

## 2023-05-24 PROCEDURE — 96372 THER/PROPH/DIAG INJ SC/IM: CPT | Mod: S$GLB,,, | Performed by: INTERNAL MEDICINE

## 2023-05-24 RX ADMIN — CYANOCOBALAMIN 1000 MCG: 1000 INJECTION, SOLUTION INTRAMUSCULAR; SUBCUTANEOUS at 10:05

## 2023-05-24 NOTE — PROGRESS NOTES
1053 Used 2 pt identifiers and reviewed allergies prior to giving cyanocobalamin injection 1,000 mcg injection in the r/deltoid,  then asked pt to wait 15 mins post injection for s/sx of adverse reactions.

## 2023-06-02 ENCOUNTER — PATIENT MESSAGE (OUTPATIENT)
Dept: OBSTETRICS AND GYNECOLOGY | Facility: CLINIC | Age: 38
End: 2023-06-02
Payer: COMMERCIAL

## 2023-06-02 NOTE — TELEPHONE ENCOUNTER
Looks like these two weren't sent to [email or web address].    Please resend and I'll notify the pt.     New prescription sent for vials, he was using this therapy previously without issues. Message sent to [facility] to update PA.

## 2023-06-06 ENCOUNTER — NUTRITION (OUTPATIENT)
Dept: NUTRITION | Facility: CLINIC | Age: 38
End: 2023-06-06

## 2023-06-06 DIAGNOSIS — Z71.3 NUTRITIONAL COUNSELING: Primary | ICD-10-CM

## 2023-06-06 PROCEDURE — 99999 PR PBB SHADOW E&M-EST. PATIENT-LVL I: CPT | Mod: PBBFAC,,, | Performed by: DIETITIAN, REGISTERED

## 2023-06-06 PROCEDURE — 99499 UNLISTED E&M SERVICE: CPT | Mod: CSM,,, | Performed by: DIETITIAN, REGISTERED

## 2023-06-06 PROCEDURE — 99999 PR PBB SHADOW E&M-EST. PATIENT-LVL I: ICD-10-PCS | Mod: PBBFAC,,, | Performed by: DIETITIAN, REGISTERED

## 2023-06-06 PROCEDURE — 99499 NO LOS: ICD-10-PCS | Mod: CSM,,, | Performed by: DIETITIAN, REGISTERED

## 2023-06-06 NOTE — PROGRESS NOTES
"  Nutrition Assessment for Follow up Medical Nutrition Therapy-- 12 week protocol $575      Reason for MNT visit: Pt in for education and nutrition counseling regarding  longevity, desired weight loss, feel better .       ASSESSMENT    Age: 37 y.o.  Wt:   145.9 lbs (86.2 lbs lean body mass, 46.3 lbs skeletal muscle mass, 59.7 lbs fat mass, visceral fat level 15)  147.3 lbs (89.5 lbs lean body mass, 48.7 lbs skeletal muscle mass, 57.8 lbs fat mass visceral fat level 13)  147 lbs (decrease 3 lbs fat, increase 2 lbs muscle mass)  148.1 lbs (86.9 lbs lean mass, 46.7 lbs skeletal muscle mass, 61.2 lbs fat mass, visceral fat level 14)  REE: 1300  Wt Readings from Last 1 Encounters:   05/08/23 65.8 kg (145 lb)     Ht:   Ht Readings from Last 1 Encounters:   05/08/23 5' 4" (1.626 m)     BMI:   BMI Readings from Last 1 Encounters:   05/08/23 24.89 kg/m²       Clinical signs/symptoms: weight gain over 1.5 years, high cholesterol    Medical History:   Past Medical History:   Diagnosis Date    Abnormal cervical Papanicolaou smear 2011, 11-19-12    Colposcopy , HONEY 1    Anxiety     Childhood asthma     Cholelithiasis complicating pregnancy, antepartum      Problem List             Resolved    Cholelithiasis: seen on u/s 2014         Anxiety and depression         Acne         Iron deficiency         Vitamin D insufficiency         Mixed hyperlipidemia         Renal stone: R non obstructing 2/22         B12 deficiency            Medications:   Current Outpatient Medications:     cholecalciferol, vitamin D3, (VITAMIN D3) 25 mcg (1,000 unit) capsule, Take 2 capsules (2,000 Units total) by mouth once daily., Disp: 180 capsule, Rfl: 3    cyanocobalamin (VITAMIN B-12) 1000 MCG tablet, Take 1 tablet (1,000 mcg total) by mouth once daily., Disp: 30 tablet, Rfl: 12    ferrous sulfate (FEOSOL) 325 mg (65 mg iron) Tab tablet, Take 1 tablet (325 mg total) by mouth 3 (three) times a week., Disp: 36 tablet, Rfl: 3    LORazepam (ATIVAN) 0.5 MG " tablet, Take 1 tablet (0.5 mg total) by mouth daily as needed for Anxiety., Disp: 30 tablet, Rfl: 0    norgestimate-ethinyl estradioL (TRI-SPRINTEC, 28,) 0.18/0.215/0.25 mg-35 mcg (28) tablet, Take 1 tablet by mouth once daily., Disp: 84 tablet, Rfl: 4    Current Facility-Administered Medications:     cyanocobalamin injection 1,000 mcg, 1,000 mcg, Intramuscular, Q14 Days, Angelica Marin MD, 1,000 mcg at 05/24/23 1053    Supplements: B12 supplements and B12 injections    Food allergies  intolerances: NKFA, her son does have tree nuts and soy allergy    Labs:  Reviewed  Hemoglobin A1C   Date Value Ref Range Status   02/17/2022 4.9 4.0 - 5.6 % Final     Comment:     ADA Screening Guidelines:  5.7-6.4%  Consistent with prediabetes  >or=6.5%  Consistent with diabetes    High levels of fetal hemoglobin interfere with the HbA1C  assay. Heterozygous hemoglobin variants (HbS, HgC, etc)do  not significantly interfere with this assay.   However, presence of multiple variants may affect accuracy.     12/13/2017 4.8 4.0 - 5.6 % Final     Comment:     According to ADA guidelines, hemoglobin A1c <7.0% represents  optimal control in non-pregnant diabetic patients. Different  metrics may apply to specific patient populations.   Standards of Medical Care in Diabetes-2016.  For the purpose of screening for the presence of diabetes:  <5.7%     Consistent with the absence of diabetes  5.7-6.4%  Consistent with increasing risk for diabetes   (prediabetes)  >or=6.5%  Consistent with diabetes  Currently, no consensus exists for use of hemoglobin A1c  for diagnosis of diabetes for children.  This Hemoglobin A1c assay has significant interference with fetal   hemoglobin   (HbF). The results are invalid for patients with abnormal amounts of   HbF,   including those with known Hereditary Persistence   of Fetal Hemoglobin. Heterozygous hemoglobin variants (HbAS, HbAC,   HbAD, HbAE, HbA2) do not significantly interfere with this assay;    however, presence of multiple variants in a sample may impact the %   interference.       Cholesterol   Date Value Ref Range Status   02/17/2022 239 (H) 120 - 199 mg/dL Final     Comment:     The National Cholesterol Education Program (NCEP) has set the  following guidelines (reference ranges) for Cholesterol:  Optimal.....................<200 mg/dL  Borderline High.............200-239 mg/dL  High........................> or = 240 mg/dL     09/24/2019 219 (H) 120 - 199 mg/dL Final     Comment:     The National Cholesterol Education Program (NCEP) has set the  following guidelines (reference ranges) for Cholesterol:  Optimal.....................<200 mg/dL  Borderline High.............200-239 mg/dL  High........................> or = 240 mg/dL     01/26/2018 188 <200 mg/dL Final     Triglycerides   Date Value Ref Range Status   02/17/2022 143 30 - 150 mg/dL Final     Comment:     The National Cholesterol Education Program (NCEP) has set the  following guidelines (reference values) for triglycerides:  Normal......................<150 mg/dL  Borderline High.............150-199 mg/dL  High........................200-499 mg/dL     09/24/2019 68 30 - 150 mg/dL Final     Comment:     The National Cholesterol Education Program (NCEP) has set the  following guidelines (reference values) for triglycerides:  Normal......................<150 mg/dL  Borderline High.............150-199 mg/dL  High........................200-499 mg/dL     01/26/2018 50 <150 mg/dL Final     HDL   Date Value Ref Range Status   02/17/2022 75 40 - 75 mg/dL Final     Comment:     The National Cholesterol Education Program (NCEP) has set the  following guidelines (reference values) for HDL Cholesterol:  Low...............<40 mg/dL  Optimal...........>60 mg/dL     09/24/2019 90 (H) 40 - 75 mg/dL Final     Comment:     The National Cholesterol Education Program (NCEP) has set the  following guidelines (reference values) for HDL  Cholesterol:  Low...............<40 mg/dL  Optimal...........>60 mg/dL     01/26/2018 83 >50 mg/dL Final     LDL Cholesterol   Date Value Ref Range Status   02/17/2022 135.4 63.0 - 159.0 mg/dL Final     Comment:     The National Cholesterol Education Program (NCEP) has set the  following guidelines (reference values) for LDL Cholesterol:  Optimal.......................<130 mg/dL  Borderline High...............130-159 mg/dL  High..........................160-189 mg/dL  Very High.....................>190 mg/dL     09/24/2019 115.4 63.0 - 159.0 mg/dL Final     Comment:     The National Cholesterol Education Program (NCEP) has set the  following guidelines (reference values) for LDL Cholesterol:  Optimal.......................<130 mg/dL  Borderline High...............130-159 mg/dL  High..........................160-189 mg/dL  Very High.....................>190 mg/dL     01/26/2018 92 mg/dL (calc) Final     Comment:     Reference range: <100     Desirable range <100 mg/dL for patients with CHD or  diabetes and <70 mg/dL for diabetic patients with  known heart disease.     LDL-C is now calculated using the Renee   calculation, which is a validated novel method providing   better accuracy than the Friedewald equation in the   estimation of LDL-C.   Rashi FERNANDEZ et al. NELSY. 2013;310(19): 6135-3251   (http://education.Speak[employer].com/faq/MUD996)       HDL/Cholesterol Ratio   Date Value Ref Range Status   02/17/2022 31.4 20.0 - 50.0 % Final   09/24/2019 41.1 20.0 - 50.0 % Final   01/26/2018 2.3 <5.0 (calc) Final     Non-HDL Cholesterol   Date Value Ref Range Status   02/17/2022 164 mg/dL Final     Comment:     Risk category and Non-HDL cholesterol goals:  Coronary heart disease (CHD)or equivalent (10-year risk of CHD >20%):  Non-HDL cholesterol goal     <130 mg/dL  Two or more CHD risk factors and 10-year risk of CHD <= 20%:  Non-HDL cholesterol goal     <160 mg/dL  0 to 1 CHD risk factor:  Non-HDL cholesterol  goal     <190 mg/dL     09/24/2019 129 mg/dL Final     Comment:     Risk category and Non-HDL cholesterol goals:  Coronary heart disease (CHD)or equivalent (10-year risk of CHD >20%):  Non-HDL cholesterol goal     <130 mg/dL  Two or more CHD risk factors and 10-year risk of CHD <= 20%:  Non-HDL cholesterol goal     <160 mg/dL  0 to 1 CHD risk factor:  Non-HDL cholesterol goal     <190 mg/dL     01/05/2017 109 mg/dL Final     Comment:     Risk category and Non-HDL cholesterol goals:  Coronary heart disease (CHD)or equivalent (10-year risk of CHD >20%):  Non-HDL cholesterol goal     <130 mg/dL  Two or more CHD risk factors and 10-year risk of CHD <= 20%:  Non-HDL cholesterol goal     <160 mg/dL  0 to 1 CHD risk factor:  Non-HDL cholesterol goal     <190 mg/dL       Non HDL Chol. (LDL+VLDL)   Date Value Ref Range Status   01/26/2018 105 <130 mg/dL (calc) Final     Comment:     For patients with diabetes plus 1 major ASCVD risk   factor, treating to a non-HDL-C goal of <100 mg/dL   (LDL-C of <70 mg/dL) is considered a therapeutic   option.       Vitamin B-12   Date Value Ref Range Status   02/28/2023 411 210 - 950 pg/mL Final   12/21/2022 241 210 - 950 pg/mL Final   12/21/2022 185 180 - 914 ng/L Final     Comment:     B-12 <400; MMA test was performed.    Test Performed by:  [facility] [id number] [place] [address]  : Moises Lamas M.D. Ph.D.; CLIA# [id number]       TSH   Date Value Ref Range Status   10/19/2022 1.525 0.400 - 4.000 uIU/mL Final         Typical day recall: 6/6/23- Reviewed and noted during consultation. Pt accompanied with her son, Alexsander, on summer break. Sofi stated that she has been off her game, snacking a lot more, and thinking about nutrition as an afterthought. When asked what is the missing link to holding herself more accountable, pt reported that she believes she needs to get back to tracking her intake. That it was  very helpful to see her food intake objectively and to be able to make changes in the moment to what she is consuming. Discussed jazzing up what she is making for Alexsander, rather than making new meals all together. Discussed journaling on her nutrition and how she is feeling to empower more connection between food and feeling.    5/8/2023- Reviewed and noted during consultation. Pt apologized for having to cancel the last few visits. Sofi's son has been struggling with his different food allergies and sensitivities. It has put a strain on Sofi's wellbeing. Discussed putting nutrition and fueling her body back as a priority. Reviewed that Nutrition is the center of having good wellbeing-- pt stated that her sleep is disrupted, lower energy, more emotional. She has been doing well with what she can, pt has seen slight variations with her body composition.    2/9/2023- Reviewed and noted during consultation. Pt has stayed consistent with her workout regimen. She has worked it into her daily routine after bringing her son to school. She is concerned about when her son has off for [place]. Discussed making a plan with one of the other moms that her son is friends with and arrange for him to have a play date while she goes to the gym. Pt also has a challenge of not always planning ahead. Discussed having more accountability of emailing weekly her meal schedule so she is prepared with purchasing food items needed to create new recipes.    1/19/2023- Reviewed and noted during consultation. Pt noticed since our last visit she does not tell herself no. Pt reported that if she has a craving she goes and gets it. She is starting to be more mindful of what she is consuming. Discussed optimizing her meals to work for her. Reviewed meal plan. Pt has been consistent with her workout routine. Pt did report being constipated. She is leaving for a cruise tomorrow. Pt determined to still make progress.    Reviewed and  noted during consultation. Pt reported practicing intermittent fasting most days. She chooses nutrient dense foods. Pt reports that lately she may be over eating or emotional eating and eating higher quantities. Discussed finding balance on her plate and noticing fullness cues.    Beverages: Nespresso and almond milk, 3 bottles of water    Dining out: Regular, 4-6 times per week    Alcohol: nonsmoker, PT reports drinking 1-2 drinks every other weekend    Lifestyle Influences  Support System: Self, family, 7 year old son, pt  passed away in 2016 to cancer    Meal preparation/shopping: self    Current Activity Level: 3 days a week low impact on treadmill, open to working with a .    Patient motivation, anticipated barriers, expected compliance: Patient is motivated and has verbalized understanding and intent to comply    DIAGNOSIS   Problem: Overweight  Etiology: RT increase caloric intake  Signs/Symptoms: AEB food recall, BMI >25    INTERVENTION  Nutrition prescription: estimated energy requirements:   Calories: 1400  Protein: 105-120 g  Carbohydrates: 120-135 g  Focus on plant-based, heart healthy fats  Total Fat: 45-55 g  Baseline for fluids: 75 fl ounces + sweat loss    Recommendations & Goals:  Patient goals and recommendations are tailored to the specific patient's needs, readiness to change, lifestyle, culture, skills, resources, & abilities. Strategies to help achieve these nutrition-related goals were discussed which can include but are not limited to SMART goal setting & mindful eating.     Aim for a minimum of 7 hours sleep   Exercise 60 minutes most days  Eat breakfast within 1-2 hours of waking up  Try not to skip any meals or snacks, not going more than 3-4 hours without eating.   At each meal and snack, try to include a source of fiber + lean protein + healthy fat.     Written Materials Provided  These resources are intended to assist the patient in making it easier to choose recommended  options when eating out & to identify better-for-you brands at the grocery store:    Meal Planning Guide with recommendations discussed along with portion sizes and a customized meal plan   Eat Fit pilo card as a reminder to download the pilo to ones smart phone which provides: current Eat Fit partners, approved menu options, food labels for carb counting, & recipes   On-the-Go Food Guide  Brand Specific Eat Fit Grocery Product list   RD contact information- for patient to contact regarding any questions, needs, and/or concerns that may arise     MONITORING & EVALUATION    Communicated with healthcare provider    Documented plan for referral to appropriate agency/healthcare provider as needed    Comprehension: good     Motivation to change: high    Follow-up: Yes, in 4 weeks    Counseling time: 1 Hours

## 2023-06-07 ENCOUNTER — CLINICAL SUPPORT (OUTPATIENT)
Dept: INTERNAL MEDICINE | Facility: CLINIC | Age: 38
End: 2023-06-07
Payer: COMMERCIAL

## 2023-06-07 PROCEDURE — 96372 THER/PROPH/DIAG INJ SC/IM: CPT | Mod: S$GLB,,, | Performed by: INTERNAL MEDICINE

## 2023-06-07 PROCEDURE — 96372 PR INJECTION,THERAP/PROPH/DIAG2ST, IM OR SUBCUT: ICD-10-PCS | Mod: S$GLB,,, | Performed by: INTERNAL MEDICINE

## 2023-06-07 RX ADMIN — CYANOCOBALAMIN 1000 MCG: 1000 INJECTION, SOLUTION INTRAMUSCULAR; SUBCUTANEOUS at 01:06

## 2023-06-20 ENCOUNTER — PATIENT MESSAGE (OUTPATIENT)
Dept: INTERNAL MEDICINE | Facility: CLINIC | Age: 38
End: 2023-06-20
Payer: COMMERCIAL

## 2023-06-20 DIAGNOSIS — R41.840 ATTENTION AND CONCENTRATION DEFICIT: Primary | ICD-10-CM

## 2023-06-21 ENCOUNTER — CLINICAL SUPPORT (OUTPATIENT)
Dept: INTERNAL MEDICINE | Facility: CLINIC | Age: 38
End: 2023-06-21
Payer: COMMERCIAL

## 2023-06-21 PROCEDURE — 96372 THER/PROPH/DIAG INJ SC/IM: CPT | Mod: S$GLB,,, | Performed by: INTERNAL MEDICINE

## 2023-06-21 PROCEDURE — 96372 PR INJECTION,THERAP/PROPH/DIAG2ST, IM OR SUBCUT: ICD-10-PCS | Mod: S$GLB,,, | Performed by: INTERNAL MEDICINE

## 2023-06-21 RX ADMIN — CYANOCOBALAMIN 1000 MCG: 1000 INJECTION, SOLUTION INTRAMUSCULAR; SUBCUTANEOUS at 09:06

## 2023-07-05 ENCOUNTER — CLINICAL SUPPORT (OUTPATIENT)
Dept: INTERNAL MEDICINE | Facility: CLINIC | Age: 38
End: 2023-07-05
Payer: COMMERCIAL

## 2023-07-05 PROCEDURE — 96372 THER/PROPH/DIAG INJ SC/IM: CPT | Mod: S$GLB,,, | Performed by: INTERNAL MEDICINE

## 2023-07-05 PROCEDURE — 96372 PR INJECTION,THERAP/PROPH/DIAG2ST, IM OR SUBCUT: ICD-10-PCS | Mod: S$GLB,,, | Performed by: INTERNAL MEDICINE

## 2023-07-05 RX ADMIN — CYANOCOBALAMIN 1000 MCG: 1000 INJECTION, SOLUTION INTRAMUSCULAR; SUBCUTANEOUS at 09:07

## 2023-07-06 ENCOUNTER — NUTRITION (OUTPATIENT)
Dept: NUTRITION | Facility: CLINIC | Age: 38
End: 2023-07-06
Payer: COMMERCIAL

## 2023-07-06 VITALS — BODY MASS INDEX: 25.37 KG/M2 | WEIGHT: 147.81 LBS

## 2023-07-06 DIAGNOSIS — Z71.3 NUTRITIONAL COUNSELING: Primary | ICD-10-CM

## 2023-07-06 PROCEDURE — 99499 UNLISTED E&M SERVICE: CPT | Mod: CSM,,, | Performed by: DIETITIAN, REGISTERED

## 2023-07-06 PROCEDURE — 99999 PR PBB SHADOW E&M-EST. PATIENT-LVL I: CPT | Mod: PBBFAC,,, | Performed by: DIETITIAN, REGISTERED

## 2023-07-06 PROCEDURE — 99499 NO LOS: ICD-10-PCS | Mod: CSM,,, | Performed by: DIETITIAN, REGISTERED

## 2023-07-06 PROCEDURE — 99999 PR PBB SHADOW E&M-EST. PATIENT-LVL I: ICD-10-PCS | Mod: PBBFAC,,, | Performed by: DIETITIAN, REGISTERED

## 2023-07-06 NOTE — PROGRESS NOTES
"  Nutrition Assessment for Follow up Medical Nutrition Therapy-- 12 week protocol $575      Reason for MNT visit: Pt in for education and nutrition counseling regarding  longevity, desired weight loss, feel better .       ASSESSMENT    Age: 37 y.o.  Wt: 147.8 lbs (46.8 lbs skeletal muscle mass, 60.9 lbs fat mass, 41.3 % body fat, visceral fat level 15)  149.8 lbs (47.1 lbs skeletal muscle mass, 61.8 lbs fat mass, 41.3% body fat, visceral fat level 15)  145.9 lbs (86.2 lbs lean body mass, 46.3 lbs skeletal muscle mass, 59.7 lbs fat mass, visceral fat level 15)  147.3 lbs (89.5 lbs lean body mass, 48.7 lbs skeletal muscle mass, 57.8 lbs fat mass visceral fat level 13)  147 lbs (decrease 3 lbs fat, increase 2 lbs muscle mass)  148.1 lbs (86.9 lbs lean mass, 46.7 lbs skeletal muscle mass, 61.2 lbs fat mass, visceral fat level 14)  REE: 1300  Wt Readings from Last 1 Encounters:   07/06/23 67 kg (147 lb 12.8 oz)     Ht:   Ht Readings from Last 1 Encounters:   05/08/23 5' 4" (1.626 m)     BMI:   BMI Readings from Last 1 Encounters:   07/06/23 25.37 kg/m²       Clinical signs/symptoms: weight gain over 1.5 years, high cholesterol    Medical History:   Past Medical History:   Diagnosis Date    Abnormal cervical Papanicolaou smear 2011, 11-19-12    Colposcopy , HONEY 1    Anxiety     Childhood asthma     Cholelithiasis complicating pregnancy, antepartum      Problem List             Resolved    Cholelithiasis: seen on u/s 2014         Anxiety and depression         Acne         Iron deficiency         Vitamin D insufficiency         Mixed hyperlipidemia         Renal stone: R non obstructing 2/22         B12 deficiency            Medications:   Current Outpatient Medications:     cholecalciferol, vitamin D3, (VITAMIN D3) 25 mcg (1,000 unit) capsule, Take 2 capsules (2,000 Units total) by mouth once daily., Disp: 180 capsule, Rfl: 3    cyanocobalamin (VITAMIN B-12) 1000 MCG tablet, Take 1 tablet (1,000 mcg total) by mouth once " daily., Disp: 30 tablet, Rfl: 12    ferrous sulfate (FEOSOL) 325 mg (65 mg iron) Tab tablet, Take 1 tablet (325 mg total) by mouth 3 (three) times a week., Disp: 36 tablet, Rfl: 3    LORazepam (ATIVAN) 0.5 MG tablet, Take 1 tablet (0.5 mg total) by mouth daily as needed for Anxiety., Disp: 30 tablet, Rfl: 0    norgestimate-ethinyl estradioL (TRI-SPRINTEC, 28,) 0.18/0.215/0.25 mg-35 mcg (28) tablet, Take 1 tablet by mouth once daily., Disp: 84 tablet, Rfl: 4    Current Facility-Administered Medications:     cyanocobalamin injection 1,000 mcg, 1,000 mcg, Intramuscular, Q14 Days, Angelica Marin MD, 1,000 mcg at 07/05/23 0941    Supplements: B12 supplements and B12 injections    Food allergies  intolerances: NKFA, her son does have tree nuts and soy allergy    Labs:  Reviewed  Hemoglobin A1C   Date Value Ref Range Status   02/17/2022 4.9 4.0 - 5.6 % Final     Comment:     ADA Screening Guidelines:  5.7-6.4%  Consistent with prediabetes  >or=6.5%  Consistent with diabetes    High levels of fetal hemoglobin interfere with the HbA1C  assay. Heterozygous hemoglobin variants (HbS, HgC, etc)do  not significantly interfere with this assay.   However, presence of multiple variants may affect accuracy.     12/13/2017 4.8 4.0 - 5.6 % Final     Comment:     According to ADA guidelines, hemoglobin A1c <7.0% represents  optimal control in non-pregnant diabetic patients. Different  metrics may apply to specific patient populations.   Standards of Medical Care in Diabetes-2016.  For the purpose of screening for the presence of diabetes:  <5.7%     Consistent with the absence of diabetes  5.7-6.4%  Consistent with increasing risk for diabetes   (prediabetes)  >or=6.5%  Consistent with diabetes  Currently, no consensus exists for use of hemoglobin A1c  for diagnosis of diabetes for children.  This Hemoglobin A1c assay has significant interference with fetal   hemoglobin   (HbF). The results are invalid for patients with abnormal  amounts of   HbF,   including those with known Hereditary Persistence   of Fetal Hemoglobin. Heterozygous hemoglobin variants (HbAS, HbAC,   HbAD, HbAE, HbA2) do not significantly interfere with this assay;   however, presence of multiple variants in a sample may impact the %   interference.       Cholesterol   Date Value Ref Range Status   02/17/2022 239 (H) 120 - 199 mg/dL Final     Comment:     The National Cholesterol Education Program (NCEP) has set the  following guidelines (reference ranges) for Cholesterol:  Optimal.....................<200 mg/dL  Borderline High.............200-239 mg/dL  High........................> or = 240 mg/dL     09/24/2019 219 (H) 120 - 199 mg/dL Final     Comment:     The National Cholesterol Education Program (NCEP) has set the  following guidelines (reference ranges) for Cholesterol:  Optimal.....................<200 mg/dL  Borderline High.............200-239 mg/dL  High........................> or = 240 mg/dL     01/26/2018 188 <200 mg/dL Final     Triglycerides   Date Value Ref Range Status   02/17/2022 143 30 - 150 mg/dL Final     Comment:     The National Cholesterol Education Program (NCEP) has set the  following guidelines (reference values) for triglycerides:  Normal......................<150 mg/dL  Borderline High.............150-199 mg/dL  High........................200-499 mg/dL     09/24/2019 68 30 - 150 mg/dL Final     Comment:     The National Cholesterol Education Program (NCEP) has set the  following guidelines (reference values) for triglycerides:  Normal......................<150 mg/dL  Borderline High.............150-199 mg/dL  High........................200-499 mg/dL     01/26/2018 50 <150 mg/dL Final     HDL   Date Value Ref Range Status   02/17/2022 75 40 - 75 mg/dL Final     Comment:     The National Cholesterol Education Program (NCEP) has set the  following guidelines (reference values) for HDL Cholesterol:  Low...............<40  mg/dL  Optimal...........>60 mg/dL     09/24/2019 90 (H) 40 - 75 mg/dL Final     Comment:     The National Cholesterol Education Program (NCEP) has set the  following guidelines (reference values) for HDL Cholesterol:  Low...............<40 mg/dL  Optimal...........>60 mg/dL     01/26/2018 83 >50 mg/dL Final     LDL Cholesterol   Date Value Ref Range Status   02/17/2022 135.4 63.0 - 159.0 mg/dL Final     Comment:     The National Cholesterol Education Program (NCEP) has set the  following guidelines (reference values) for LDL Cholesterol:  Optimal.......................<130 mg/dL  Borderline High...............130-159 mg/dL  High..........................160-189 mg/dL  Very High.....................>190 mg/dL     09/24/2019 115.4 63.0 - 159.0 mg/dL Final     Comment:     The National Cholesterol Education Program (NCEP) has set the  following guidelines (reference values) for LDL Cholesterol:  Optimal.......................<130 mg/dL  Borderline High...............130-159 mg/dL  High..........................160-189 mg/dL  Very High.....................>190 mg/dL     01/26/2018 92 mg/dL (calc) Final     Comment:     Reference range: <100     Desirable range <100 mg/dL for patients with CHD or  diabetes and <70 mg/dL for diabetic patients with  known heart disease.     LDL-C is now calculated using the Rashi-Browning   calculation, which is a validated novel method providing   better accuracy than the Friedewald equation in the   estimation of LDL-C.   Rashi SS et al. NELSY. 2013;310(19): 8546-3598   (http://education.[employer].JW Player/faq/LSA983)       HDL/Cholesterol Ratio   Date Value Ref Range Status   02/17/2022 31.4 20.0 - 50.0 % Final   09/24/2019 41.1 20.0 - 50.0 % Final   01/26/2018 2.3 <5.0 (calc) Final     Non-HDL Cholesterol   Date Value Ref Range Status   02/17/2022 164 mg/dL Final     Comment:     Risk category and Non-HDL cholesterol goals:  Coronary heart disease (CHD)or equivalent (10-year risk of  CHD >20%):  Non-HDL cholesterol goal     <130 mg/dL  Two or more CHD risk factors and 10-year risk of CHD <= 20%:  Non-HDL cholesterol goal     <160 mg/dL  0 to 1 CHD risk factor:  Non-HDL cholesterol goal     <190 mg/dL     09/24/2019 129 mg/dL Final     Comment:     Risk category and Non-HDL cholesterol goals:  Coronary heart disease (CHD)or equivalent (10-year risk of CHD >20%):  Non-HDL cholesterol goal     <130 mg/dL  Two or more CHD risk factors and 10-year risk of CHD <= 20%:  Non-HDL cholesterol goal     <160 mg/dL  0 to 1 CHD risk factor:  Non-HDL cholesterol goal     <190 mg/dL     01/05/2017 109 mg/dL Final     Comment:     Risk category and Non-HDL cholesterol goals:  Coronary heart disease (CHD)or equivalent (10-year risk of CHD >20%):  Non-HDL cholesterol goal     <130 mg/dL  Two or more CHD risk factors and 10-year risk of CHD <= 20%:  Non-HDL cholesterol goal     <160 mg/dL  0 to 1 CHD risk factor:  Non-HDL cholesterol goal     <190 mg/dL       Non HDL Chol. (LDL+VLDL)   Date Value Ref Range Status   01/26/2018 105 <130 mg/dL (calc) Final     Comment:     For patients with diabetes plus 1 major ASCVD risk   factor, treating to a non-HDL-C goal of <100 mg/dL   (LDL-C of <70 mg/dL) is considered a therapeutic   option.       Vitamin B-12   Date Value Ref Range Status   02/28/2023 411 210 - 950 pg/mL Final   12/21/2022 241 210 - 950 pg/mL Final   12/21/2022 185 180 - 914 ng/L Final     Comment:     B-12 <400; MMA test was performed.    Test Performed by:  [facility]  [address] [place] [address]  : Moises Lamas M.D. Ph.D.; CLIA# [id number]       TSH   Date Value Ref Range Status   10/19/2022 1.525 0.400 - 4.000 uIU/mL Final         Typical day recall: 7/6/2023- Reviewed and noted during consultation. Pt accompanied with her son. Pt just got back from a weekend away, she has been more conscientious of her eating habits and food  choices.  She is starting to see a difference in her clothes fitting. She is tracking on [employer] pilo for daily accountability. She reports after the weekend getting off track with food logging a bit. Pt reports her biggest sandra is staying consistent. Sofi stated that she thinks she has ADD and will be going through testing in August. Recommended continuing to focus on protein intake, hydration and now layering in focusing on complex carbohydrates. Also delaying caffeine intake to help reduce spikes in cortisol.  Her family and friends are going to [place] next week for a week.    6/6/23- Reviewed and noted during consultation. Pt accompanied with her son, Alexsander, on summer break. Sofi stated that she has been off her game, snacking a lot more, and thinking about nutrition as an afterthought. When asked what is the missing link to holding herself more accountable, pt reported that she believes she needs to get back to tracking her intake. That it was very helpful to see her food intake objectively and to be able to make changes in the moment to what she is consuming. Discussed jazzing up what she is making for Alexsander, rather than making new meals all together. Discussed journaling on her nutrition and how she is feeling to empower more connection between food and feeling.    5/8/2023- Reviewed and noted during consultation. Pt apologized for having to cancel the last few visits. Sofi's son has been struggling with his different food allergies and sensitivities. It has put a strain on Sofi's wellbeing. Discussed putting nutrition and fueling her body back as a priority. Reviewed that Nutrition is the center of having good wellbeing-- pt stated that her sleep is disrupted, lower energy, more emotional. She has been doing well with what she can, pt has seen slight variations with her body composition.    2/9/2023- Reviewed and noted during consultation. Pt has stayed consistent with her workout  regimen. She has worked it into her daily routine after bringing her son to school. She is concerned about when her son has off for [place]ru Burgos. Discussed making a plan with one of the other moms that her son is friends with and arrange for him to have a play date while she goes to the gym. Pt also has a challenge of not always planning ahead. Discussed having more accountability of emailing weekly her meal schedule so she is prepared with purchasing food items needed to create new recipes.    1/19/2023- Reviewed and noted during consultation. Pt noticed since our last visit she does not tell herself no. Pt reported that if she has a craving she goes and gets it. She is starting to be more mindful of what she is consuming. Discussed optimizing her meals to work for her. Reviewed meal plan. Pt has been consistent with her workout routine. Pt did report being constipated. She is leaving for a cruise tomorrow. Pt determined to still make progress.    Reviewed and noted during consultation. Pt reported practicing intermittent fasting most days. She chooses nutrient dense foods. Pt reports that lately she may be over eating or emotional eating and eating higher quantities. Discussed finding balance on her plate and noticing fullness cues.    Beverages: Nespresso and almond milk, 3 bottles of water    Dining out: Regular, 4-6 times per week    Alcohol: nonsmoker, Pt reports drinking 1-2 drinks every other weekend    Lifestyle Influences  Support System: Self, family, 7 year old son, pt  passed away in 2016 to cancer    Meal preparation/shopping: self    Current Activity Level: 3 days a week low impact on treadmill, open to working with a .    Patient motivation, anticipated barriers, expected compliance: Patient is motivated and has verbalized understanding and intent to comply    DIAGNOSIS   Problem: Overweight  Etiology: RT increase caloric intake  Signs/Symptoms: AEB food recall, BMI  >25    INTERVENTION  Nutrition prescription: estimated energy requirements:   Calories: 1400  Protein: 105-120 g  Carbohydrates: 120-135 g  Focus on plant-based, heart healthy fats  Total Fat: 45-55 g  Baseline for fluids: 75 fl ounces + sweat loss    Recommendations & Goals:  Patient goals and recommendations are tailored to the specific patient's needs, readiness to change, lifestyle, culture, skills, resources, & abilities. Strategies to help achieve these nutrition-related goals were discussed which can include but are not limited to SMART goal setting & mindful eating.     Aim for a minimum of 7 hours sleep   Exercise 60 minutes most days  Eat breakfast within 1-2 hours of waking up  Try not to skip any meals or snacks, not going more than 3-4 hours without eating.   At each meal and snack, try to include a source of fiber + lean protein + healthy fat.     Written Materials Provided  These resources are intended to assist the patient in making it easier to choose recommended options when eating out & to identify better-for-you brands at the grocery store:    Meal Planning Guide with recommendations discussed along with portion sizes and a customized meal plan   Eat Fit iplo card as a reminder to download the pilo to ones smart phone which provides: current Eat Fit partners, approved menu options, food labels for carb counting, & recipes   On-the-Go Food Guide  Brand Specific [employer] Grocery Product list   RD contact information- for patient to contact regarding any questions, needs, and/or concerns that may arise     MONITORING & EVALUATION    Communicated with healthcare provider    Documented plan for referral to appropriate agency/healthcare provider as needed    Comprehension: good     Motivation to change: high    Follow-up: Yes, in 4-5 weeks    Counseling time: 1 Hours

## 2023-07-19 ENCOUNTER — TELEPHONE (OUTPATIENT)
Dept: INTERNAL MEDICINE | Facility: CLINIC | Age: 38
End: 2023-07-19
Payer: COMMERCIAL

## 2023-07-19 NOTE — TELEPHONE ENCOUNTER
----- Message from Nallely Vazquez sent at 7/19/2023 11:11 AM CDT -----  Contact: [phone]  Patient called, would like to reschedule her b12 shot with nurse. Thank you

## 2023-07-20 ENCOUNTER — CLINICAL SUPPORT (OUTPATIENT)
Dept: INTERNAL MEDICINE | Facility: CLINIC | Age: 38
End: 2023-07-20
Payer: COMMERCIAL

## 2023-07-20 PROCEDURE — 96372 THER/PROPH/DIAG INJ SC/IM: CPT | Mod: S$GLB,,, | Performed by: INTERNAL MEDICINE

## 2023-07-20 PROCEDURE — 96372 PR INJECTION,THERAP/PROPH/DIAG2ST, IM OR SUBCUT: ICD-10-PCS | Mod: S$GLB,,, | Performed by: INTERNAL MEDICINE

## 2023-07-20 RX ADMIN — CYANOCOBALAMIN 1000 MCG: 1000 INJECTION, SOLUTION INTRAMUSCULAR; SUBCUTANEOUS at 02:07

## 2023-08-01 ENCOUNTER — OFFICE VISIT (OUTPATIENT)
Dept: PSYCHIATRY | Facility: CLINIC | Age: 38
End: 2023-08-01
Payer: COMMERCIAL

## 2023-08-01 VITALS
DIASTOLIC BLOOD PRESSURE: 59 MMHG | SYSTOLIC BLOOD PRESSURE: 119 MMHG | HEART RATE: 85 BPM | WEIGHT: 149.13 LBS | BODY MASS INDEX: 25.6 KG/M2

## 2023-08-01 DIAGNOSIS — F41.1 GENERALIZED ANXIETY DISORDER: Primary | ICD-10-CM

## 2023-08-01 PROCEDURE — 99999 PR PBB SHADOW E&M-EST. PATIENT-LVL III: ICD-10-PCS | Mod: PBBFAC,,, | Performed by: NURSE PRACTITIONER

## 2023-08-01 PROCEDURE — 99205 PR OFFICE/OUTPT VISIT, NEW, LEVL V, 60-74 MIN: ICD-10-PCS | Mod: S$GLB,,, | Performed by: NURSE PRACTITIONER

## 2023-08-01 PROCEDURE — 3078F DIAST BP <80 MM HG: CPT | Mod: CPTII,S$GLB,, | Performed by: NURSE PRACTITIONER

## 2023-08-01 PROCEDURE — 3074F PR MOST RECENT SYSTOLIC BLOOD PRESSURE < 130 MM HG: ICD-10-PCS | Mod: CPTII,S$GLB,, | Performed by: NURSE PRACTITIONER

## 2023-08-01 PROCEDURE — 1159F MED LIST DOCD IN RCRD: CPT | Mod: CPTII,S$GLB,, | Performed by: NURSE PRACTITIONER

## 2023-08-01 PROCEDURE — 99205 OFFICE O/P NEW HI 60 MIN: CPT | Mod: S$GLB,,, | Performed by: NURSE PRACTITIONER

## 2023-08-01 PROCEDURE — 3074F SYST BP LT 130 MM HG: CPT | Mod: CPTII,S$GLB,, | Performed by: NURSE PRACTITIONER

## 2023-08-01 PROCEDURE — 3008F BODY MASS INDEX DOCD: CPT | Mod: CPTII,S$GLB,, | Performed by: NURSE PRACTITIONER

## 2023-08-01 PROCEDURE — 99999 PR PBB SHADOW E&M-EST. PATIENT-LVL III: CPT | Mod: PBBFAC,,, | Performed by: NURSE PRACTITIONER

## 2023-08-01 PROCEDURE — 1159F PR MEDICATION LIST DOCUMENTED IN MEDICAL RECORD: ICD-10-PCS | Mod: CPTII,S$GLB,, | Performed by: NURSE PRACTITIONER

## 2023-08-01 PROCEDURE — 1160F PR REVIEW ALL MEDS BY PRESCRIBER/CLIN PHARMACIST DOCUMENTED: ICD-10-PCS | Mod: CPTII,S$GLB,, | Performed by: NURSE PRACTITIONER

## 2023-08-01 PROCEDURE — 3008F PR BODY MASS INDEX (BMI) DOCUMENTED: ICD-10-PCS | Mod: CPTII,S$GLB,, | Performed by: NURSE PRACTITIONER

## 2023-08-01 PROCEDURE — 3078F PR MOST RECENT DIASTOLIC BLOOD PRESSURE < 80 MM HG: ICD-10-PCS | Mod: CPTII,S$GLB,, | Performed by: NURSE PRACTITIONER

## 2023-08-01 PROCEDURE — 1160F RVW MEDS BY RX/DR IN RCRD: CPT | Mod: CPTII,S$GLB,, | Performed by: NURSE PRACTITIONER

## 2023-08-01 RX ORDER — BUSPIRONE HYDROCHLORIDE 15 MG/1
15 TABLET ORAL 3 TIMES DAILY
Qty: 90 TABLET | Refills: 11 | Status: SHIPPED | OUTPATIENT
Start: 2023-08-01 | End: 2023-10-24

## 2023-08-01 NOTE — PROGRESS NOTES
Outpatient Psychiatry Initial Visit (MD/NP)    8/1/2023    Sofi Vidal, a 38 y.o. female, presenting for initial evaluation visit. Met with patient.    Reason for Encounter: self-referral. Patient complains of anxiety.    History of Present Illness:     Pt is a 38 year old female who presents for psychiartric evaluation.       Trouble focusing and remembering thisng.     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  Family hx of depression and anxiety from mom.    Tobacco=none  ETOH= occasinally  Drug= none    Stressors/Triggers: manaing business and mom    Coping Skills:     Medical History: none    Review Of Systems:     GENERAL:  No weight gain or loss  SKIN:  No rashes or lacerations  HEAD:  No headaches  EYES:  No exophthalmos, jaundice or blindness  EARS:  No dizziness, tinnitus or hearing loss  NOSE:  No changes in smell  MOUTH & THROAT:  No dyskinetic movements or obvious goiter  CHEST:  No shortness of breath, hyperventilation or cough  CARDIOVASCULAR:  No tachycardia or chest pain  ABDOMEN:  No nausea, vomiting, pain, constipation or diarrhea  URINARY:  No frequency, dysuria or sexual dysfunction  ENDOCRINE:  No polydipsia, polyuria  MUSCULOSKELETAL:  No pain or stiffness of the joints  NEUROLOGIC:  No weakness, sensory changes, seizures, confusion, memory loss, tremor or other abnormal movements    Current Evaluation:     Nutritional Screening: Considering the patient's height and weight, medications, medical history and preferences, should a referral be made to the dietitian? no    Constitutional  Vitals:  Most recent vital signs, dated  greater than 90 days prior to this appointment, were reviewed.    Vitals:    08/01/23 0802   BP: (!) 119/59   Pulse: 85   Weight: 67.7 kg (149 lb 2.3 oz)        General:  unremarkable, age appropriate     Musculoskeletal  Muscle Strength/Tone:  not examined   Gait & Station:  non-ataxic     Psychiatric  Speech:  no latency; no press   Mood & Affect:  steady  congruent and appropriate   Thought Process:  normal and logical   Associations:  intact   Thought Content:  normal, no suicidality, no homicidality, delusions, or paranoia   Insight:  intact   Judgement: behavior is adequate to circumstances   Orientation:  grossly intact   Memory: intact for content of interview   Language: grossly intact   Attention Span & Concentration:  able to focus   Fund of Knowledge:  intact and appropriate to age and level of education       Relevant Elements of Neurological Exam: normal gait    Functioning in Relationships:  Spouse/partner: see above HPI  Peers: see above HPI  Employers: see above HPI    Laboratory Data  No visits with results within 1 Month(s) from this visit.   Latest known visit with results is:   Office Visit on 05/04/2023   Component Date Value Ref Range Status    Cytology ThinPrep Pap Source 05/04/2023 Cervix   Final    Cytology ThinPrep Pap Report Status 05/04/2023 DNR   Final    Cytology Thinprep PAP Clinical His* 05/04/2023 Routine exam   Corrected    Cytology ThinPrep Pap LMP 05/04/2023 [id number]   Final    Cytology ThinPrep Previous PAP 05/04/2023 No   Final    Cytology ThinPrep Previous Biopsy 05/04/2023 No   Final    Cytology ThinPrep PAP Adequacy 05/04/2023 SEE BELOW   Final    Cytology ThinPrep PAP General Elle* 05/04/2023 DNR   Final    Cytology ThinPrep PAP Interpretati* 05/04/2023 SEE BELOW   Final    Cytology ThinPrep PAP Comment 05/04/2023 SEE BELOW   Final    Cytotechnologist 05/04/2023 SEE BELOW   Final    Review Cytotechnologist 05/04/2023 DNR   Final    Pathologist 05/04/2023 DNR   Final     Cytology ThinPrep PAP Infection 05/04/2023 DNR   Final    Cytology Thin Prep Pap Explanation 05/04/2023 SEE BELOW   Final         Medications  Outpatient Encounter Medications as of 8/1/2023   Medication Sig Dispense Refill    busPIRone (BUSPAR) 15 MG tablet Take 1 tablet (15 mg total) by mouth 3 (three) times daily. 90 tablet 11    cholecalciferol, vitamin D3, (VITAMIN D3) 25 mcg (1,000 unit) capsule Take 2 capsules (2,000 Units total) by mouth once daily. 180 capsule 3    cyanocobalamin (VITAMIN B-12) 1000 MCG tablet Take 1 tablet (1,000 mcg total) by mouth once daily. 30 tablet 12    ferrous sulfate (FEOSOL) 325 mg (65 mg iron) Tab tablet Take 1 tablet (325 mg total) by mouth 3 (three) times a week. 36 tablet 3    LORazepam (ATIVAN) 0.5 MG tablet Take 1 tablet (0.5 mg total) by mouth daily as needed for Anxiety. 30 tablet 0    norgestimate-ethinyl estradioL (TRI-SPRINTEC, 28,) 0.18/0.215/0.25 mg-35 mcg (28) tablet Take 1 tablet by mouth once daily. 84 tablet 4     Facility-Administered Encounter Medications as of 8/1/2023   Medication Dose Route Frequency Provider Last Rate Last Admin    cyanocobalamin injection 1,000 mcg  1,000 mcg Intramuscular Q14 Days Angelica Marin MD   1,000 mcg at 08/03/23 1001     Assessment - Diagnosis - Goals:     Impression:       ICD-10-CM ICD-9-CM   1. Generalized anxiety disorder  F41.1 300.02       Strengths and Liabilities: Strength: Patient accepts guidance/feedback, Strength: Patient is expressive/articulate., Strength: Patient is intelligent.    Treatment Goals:  Specify outcomes written in observable, behavioral terms:   Anxiety: reducing negative automatic thoughts, reducing physical symptoms of anxiety, and reducing time spent worrying (<30 minutes/day)    Treatment Plan/Recommendations:   Medication Management: The risks and benefits of medication were discussed with the patient.  The treatment plan and follow up plan were reviewed with the patient.  Reviewed available  medical records and labs  BUSPAR 15 mg take up to 3 x daily for anxiety  Counseling this visit focused on building adaptive coping skills, medication teaching, and cognitive behavioral therapy (CBT)    Return to Clinic:  1 year    Counseling time: 34 minutes  Total time: 60 minutes

## 2023-08-03 ENCOUNTER — CLINICAL SUPPORT (OUTPATIENT)
Dept: INTERNAL MEDICINE | Facility: CLINIC | Age: 38
End: 2023-08-03
Payer: COMMERCIAL

## 2023-08-03 DIAGNOSIS — E53.8 B12 DEFICIENCY: Primary | ICD-10-CM

## 2023-08-03 PROCEDURE — 99999 PR PBB SHADOW E&M-EST. PATIENT-LVL II: CPT | Mod: PBBFAC,,,

## 2023-08-03 PROCEDURE — 99999 PR PBB SHADOW E&M-EST. PATIENT-LVL II: ICD-10-PCS | Mod: PBBFAC,,,

## 2023-08-03 PROCEDURE — 96372 PR INJECTION,THERAP/PROPH/DIAG2ST, IM OR SUBCUT: ICD-10-PCS | Mod: S$GLB,,, | Performed by: INTERNAL MEDICINE

## 2023-08-03 PROCEDURE — 96372 THER/PROPH/DIAG INJ SC/IM: CPT | Mod: S$GLB,,, | Performed by: INTERNAL MEDICINE

## 2023-08-03 RX ADMIN — CYANOCOBALAMIN 1000 MCG: 1000 INJECTION, SOLUTION INTRAMUSCULAR; SUBCUTANEOUS at 10:08

## 2023-08-17 ENCOUNTER — CLINICAL SUPPORT (OUTPATIENT)
Dept: INTERNAL MEDICINE | Facility: CLINIC | Age: 38
End: 2023-08-17
Payer: COMMERCIAL

## 2023-08-17 ENCOUNTER — TELEPHONE (OUTPATIENT)
Dept: INTERNAL MEDICINE | Facility: CLINIC | Age: 38
End: 2023-08-17
Payer: COMMERCIAL

## 2023-08-17 ENCOUNTER — TELEPHONE (OUTPATIENT)
Dept: INTERNAL MEDICINE | Facility: CLINIC | Age: 38
End: 2023-08-17

## 2023-08-17 DIAGNOSIS — Z00.00 ANNUAL PHYSICAL EXAM: Primary | ICD-10-CM

## 2023-08-17 DIAGNOSIS — E53.8 B12 DEFICIENCY: Primary | ICD-10-CM

## 2023-08-17 PROCEDURE — 96372 PR INJECTION,THERAP/PROPH/DIAG2ST, IM OR SUBCUT: ICD-10-PCS | Mod: S$GLB,,, | Performed by: INTERNAL MEDICINE

## 2023-08-17 PROCEDURE — 96372 THER/PROPH/DIAG INJ SC/IM: CPT | Mod: S$GLB,,, | Performed by: INTERNAL MEDICINE

## 2023-08-17 RX ORDER — SYRINGE W-NEEDLE,DISPOSAB,3 ML 25GX5/8"
SYRINGE, EMPTY DISPOSABLE MISCELLANEOUS
Qty: 20 EACH | Refills: 1 | Status: SHIPPED | OUTPATIENT
Start: 2023-08-17 | End: 2023-10-24

## 2023-08-17 RX ORDER — CYANOCOBALAMIN 1000 UG/ML
1000 INJECTION, SOLUTION INTRAMUSCULAR; SUBCUTANEOUS
Qty: 10 ML | Refills: 12 | Status: SHIPPED | OUTPATIENT
Start: 2023-08-17 | End: 2023-10-24

## 2023-08-17 RX ADMIN — CYANOCOBALAMIN 1000 MCG: 1000 INJECTION, SOLUTION INTRAMUSCULAR; SUBCUTANEOUS at 09:08

## 2023-08-17 NOTE — TELEPHONE ENCOUNTER
Miguel A Marin this patient comes in for B12 injection as a nurse visit. She is asking if she can start to get the medicine prescribed so that she can do the injections at home

## 2023-08-17 NOTE — TELEPHONE ENCOUNTER
Thank you, I did order the B12 for her to use at home.  Please schedule her for labs before her next appointment, orders in thank you

## 2023-08-17 NOTE — TELEPHONE ENCOUNTER
Hi Dr. Marin, this patient comes in for B12 injections as a nurse visit. She is wanting to know if she can start to give the injection herself at home.

## 2023-10-17 ENCOUNTER — PATIENT MESSAGE (OUTPATIENT)
Dept: INTERNAL MEDICINE | Facility: CLINIC | Age: 38
End: 2023-10-17
Payer: COMMERCIAL

## 2023-10-19 ENCOUNTER — LAB VISIT (OUTPATIENT)
Dept: LAB | Facility: HOSPITAL | Age: 38
End: 2023-10-19
Attending: INTERNAL MEDICINE
Payer: COMMERCIAL

## 2023-10-19 DIAGNOSIS — Z00.00 ANNUAL PHYSICAL EXAM: ICD-10-CM

## 2023-10-19 LAB
ALBUMIN SERPL BCP-MCNC: 3.9 G/DL (ref 3.5–5.2)
ALP SERPL-CCNC: 61 U/L (ref 55–135)
ALT SERPL W/O P-5'-P-CCNC: 22 U/L (ref 10–44)
ANION GAP SERPL CALC-SCNC: 15 MMOL/L (ref 8–16)
AST SERPL-CCNC: 21 U/L (ref 10–40)
BASOPHILS # BLD AUTO: 0.04 K/UL (ref 0–0.2)
BASOPHILS NFR BLD: 0.7 % (ref 0–1.9)
BILIRUB SERPL-MCNC: 0.8 MG/DL (ref 0.1–1)
BUN SERPL-MCNC: 12 MG/DL (ref 6–20)
CALCIUM SERPL-MCNC: 9.2 MG/DL (ref 8.7–10.5)
CHLORIDE SERPL-SCNC: 102 MMOL/L (ref 95–110)
CHOLEST SERPL-MCNC: 219 MG/DL (ref 120–199)
CHOLEST/HDLC SERPL: 2.9 {RATIO} (ref 2–5)
CO2 SERPL-SCNC: 21 MMOL/L (ref 23–29)
CREAT SERPL-MCNC: 0.7 MG/DL (ref 0.5–1.4)
DIFFERENTIAL METHOD: ABNORMAL
EOSINOPHIL # BLD AUTO: 0 K/UL (ref 0–0.5)
EOSINOPHIL NFR BLD: 0.5 % (ref 0–8)
ERYTHROCYTE [DISTWIDTH] IN BLOOD BY AUTOMATED COUNT: 12.4 % (ref 11.5–14.5)
EST. GFR  (NO RACE VARIABLE): >60 ML/MIN/1.73 M^2
GLUCOSE SERPL-MCNC: 63 MG/DL (ref 70–110)
HCT VFR BLD AUTO: 42.7 % (ref 37–48.5)
HDLC SERPL-MCNC: 75 MG/DL (ref 40–75)
HDLC SERPL: 34.2 % (ref 20–50)
HGB BLD-MCNC: 13.4 G/DL (ref 12–16)
IMM GRANULOCYTES # BLD AUTO: 0.01 K/UL (ref 0–0.04)
IMM GRANULOCYTES NFR BLD AUTO: 0.2 % (ref 0–0.5)
LDLC SERPL CALC-MCNC: 122.2 MG/DL (ref 63–159)
LYMPHOCYTES # BLD AUTO: 2.3 K/UL (ref 1–4.8)
LYMPHOCYTES NFR BLD: 40.2 % (ref 18–48)
MCH RBC QN AUTO: 29.5 PG (ref 27–31)
MCHC RBC AUTO-ENTMCNC: 31.4 G/DL (ref 32–36)
MCV RBC AUTO: 94 FL (ref 82–98)
MONOCYTES # BLD AUTO: 0.3 K/UL (ref 0.3–1)
MONOCYTES NFR BLD: 4.4 % (ref 4–15)
NEUTROPHILS # BLD AUTO: 3.1 K/UL (ref 1.8–7.7)
NEUTROPHILS NFR BLD: 54 % (ref 38–73)
NONHDLC SERPL-MCNC: 144 MG/DL
NRBC BLD-RTO: 0 /100 WBC
PLATELET # BLD AUTO: 238 K/UL (ref 150–450)
PMV BLD AUTO: 11.7 FL (ref 9.2–12.9)
POTASSIUM SERPL-SCNC: 3.8 MMOL/L (ref 3.5–5.1)
PROT SERPL-MCNC: 7.1 G/DL (ref 6–8.4)
RBC # BLD AUTO: 4.55 M/UL (ref 4–5.4)
SODIUM SERPL-SCNC: 138 MMOL/L (ref 136–145)
TRIGL SERPL-MCNC: 109 MG/DL (ref 30–150)
TSH SERPL DL<=0.005 MIU/L-ACNC: 2.12 UIU/ML (ref 0.4–4)
WBC # BLD AUTO: 5.67 K/UL (ref 3.9–12.7)

## 2023-10-19 PROCEDURE — 84443 ASSAY THYROID STIM HORMONE: CPT | Performed by: INTERNAL MEDICINE

## 2023-10-19 PROCEDURE — 36415 COLL VENOUS BLD VENIPUNCTURE: CPT | Performed by: INTERNAL MEDICINE

## 2023-10-19 PROCEDURE — 80061 LIPID PANEL: CPT | Performed by: INTERNAL MEDICINE

## 2023-10-19 PROCEDURE — 80053 COMPREHEN METABOLIC PANEL: CPT | Performed by: INTERNAL MEDICINE

## 2023-10-19 PROCEDURE — 85025 COMPLETE CBC W/AUTO DIFF WBC: CPT | Performed by: INTERNAL MEDICINE

## 2023-10-19 PROCEDURE — 82607 VITAMIN B-12: CPT | Performed by: INTERNAL MEDICINE

## 2023-10-20 LAB — VIT B12 SERPL-MCNC: 495 PG/ML (ref 210–950)

## 2023-10-24 ENCOUNTER — PATIENT MESSAGE (OUTPATIENT)
Dept: INTERNAL MEDICINE | Facility: CLINIC | Age: 38
End: 2023-10-24

## 2023-10-24 ENCOUNTER — OFFICE VISIT (OUTPATIENT)
Dept: INTERNAL MEDICINE | Facility: CLINIC | Age: 38
End: 2023-10-24
Payer: COMMERCIAL

## 2023-10-24 VITALS — HEIGHT: 64 IN | BODY MASS INDEX: 25.6 KG/M2 | SYSTOLIC BLOOD PRESSURE: 100 MMHG | DIASTOLIC BLOOD PRESSURE: 60 MMHG

## 2023-10-24 DIAGNOSIS — E53.8 B12 DEFICIENCY: ICD-10-CM

## 2023-10-24 DIAGNOSIS — Z00.00 ANNUAL PHYSICAL EXAM: Primary | ICD-10-CM

## 2023-10-24 DIAGNOSIS — E78.2 MIXED HYPERLIPIDEMIA: ICD-10-CM

## 2023-10-24 DIAGNOSIS — R41.840 ATTENTION AND CONCENTRATION DEFICIT: ICD-10-CM

## 2023-10-24 PROCEDURE — 99999 PR PBB SHADOW E&M-EST. PATIENT-LVL IV: ICD-10-PCS | Mod: PBBFAC,,, | Performed by: INTERNAL MEDICINE

## 2023-10-24 PROCEDURE — 1159F MED LIST DOCD IN RCRD: CPT | Mod: CPTII,S$GLB,, | Performed by: INTERNAL MEDICINE

## 2023-10-24 PROCEDURE — 99396 PREV VISIT EST AGE 40-64: CPT | Mod: S$GLB,,, | Performed by: INTERNAL MEDICINE

## 2023-10-24 PROCEDURE — 3074F PR MOST RECENT SYSTOLIC BLOOD PRESSURE < 130 MM HG: ICD-10-PCS | Mod: CPTII,S$GLB,, | Performed by: INTERNAL MEDICINE

## 2023-10-24 PROCEDURE — 3074F SYST BP LT 130 MM HG: CPT | Mod: CPTII,S$GLB,, | Performed by: INTERNAL MEDICINE

## 2023-10-24 PROCEDURE — 99396 PR PREVENTIVE VISIT,EST,40-64: ICD-10-PCS | Mod: S$GLB,,, | Performed by: INTERNAL MEDICINE

## 2023-10-24 PROCEDURE — 3008F BODY MASS INDEX DOCD: CPT | Mod: CPTII,S$GLB,, | Performed by: INTERNAL MEDICINE

## 2023-10-24 PROCEDURE — 3008F PR BODY MASS INDEX (BMI) DOCUMENTED: ICD-10-PCS | Mod: CPTII,S$GLB,, | Performed by: INTERNAL MEDICINE

## 2023-10-24 PROCEDURE — 3078F PR MOST RECENT DIASTOLIC BLOOD PRESSURE < 80 MM HG: ICD-10-PCS | Mod: CPTII,S$GLB,, | Performed by: INTERNAL MEDICINE

## 2023-10-24 PROCEDURE — 3078F DIAST BP <80 MM HG: CPT | Mod: CPTII,S$GLB,, | Performed by: INTERNAL MEDICINE

## 2023-10-24 PROCEDURE — 1159F PR MEDICATION LIST DOCUMENTED IN MEDICAL RECORD: ICD-10-PCS | Mod: CPTII,S$GLB,, | Performed by: INTERNAL MEDICINE

## 2023-10-24 PROCEDURE — 99999 PR PBB SHADOW E&M-EST. PATIENT-LVL IV: CPT | Mod: PBBFAC,,, | Performed by: INTERNAL MEDICINE

## 2023-10-24 RX ORDER — VIT C/E/ZN/COPPR/LUTEIN/ZEAXAN 250MG-90MG
1000 CAPSULE ORAL DAILY
Qty: 90 CAPSULE | Refills: 3 | Status: SHIPPED | OUTPATIENT
Start: 2023-10-24

## 2023-10-24 RX ORDER — LANOLIN ALCOHOL/MO/W.PET/CERES
1000 CREAM (GRAM) TOPICAL DAILY
Qty: 30 TABLET | Refills: 12 | Status: SHIPPED | OUTPATIENT
Start: 2023-10-24

## 2023-10-24 NOTE — PROGRESS NOTES
Patient ID: Sofi Vidal is a 38 y.o. female.    Chief Complaint: Annual Exam      Assessment:       1. Annual physical exam    2. Mixed hyperlipidemia    3. B12 deficiency    4. Attention and concentration deficit          Plan:         1. Annual physical exam    2. Mixed hyperlipidemia    3. B12 deficiency    4. Attention and concentration deficit  -     Ambulatory referral/consult to Psychology; Future; Expected date: 10/31/2023    Other orders  -     cyanocobalamin (VITAMIN B-12) 1000 MCG tablet; Take 1 tablet (1,000 mcg total) by mouth once daily.  Dispense: 30 tablet; Refill: 12  -     cholecalciferol, vitamin D3, (VITAMIN D3) 25 mcg (1,000 unit) capsule; Take 1 capsule (1,000 Units total) by mouth once daily.  Dispense: 90 capsule; Refill: 3       Sleep hygiene, avoid multi tasking, continue with exercise and healthy habits  No evidence of thyroid abnormality  Basic vitamin replacement  ADD testing  OLIVER issues reviewed, she does not have typical symptoms  Portion control, continue with exercise, slow and steady weight loss  She declines COVID booster and flu shot    Subjective:   Annual exam    Labs acceptable    Some weight struggles.  Trying to go to the gym a few times a week, eating more protein, still gaining weight.  Hungry a lot of the time.    Sleep is not great.  She just started magnesium.  Some of it is her habits, also has an old dog (14)- gets her up at night.  Mostly 6-7 hours but it is interrupted, and it takes  her some time to fall back asleep.  Wakes up tired.  OK in the morning and gets tired in the afternoon.  Exercises most mornings.  Weights mainly.    No hot flashes or night sweats.      Now 150 or so, would like to be 135 pounds or so.    Patient Active Problem List   Diagnosis    Cholelithiasis: seen on u/s 2014    Anxiety and depression    Acne    Iron deficiency    Vitamin D insufficiency    Mixed hyperlipidemia    Renal stone: R non obstructing 2/22    B12 deficiency     Generalized anxiety disorder      Review of Systems   Constitutional:  Negative for fatigue.   HENT:  Negative for hearing loss.    Eyes:  Negative for visual disturbance.   Respiratory:  Negative for shortness of breath.    Cardiovascular:  Negative for chest pain.   Gastrointestinal:  Negative for abdominal pain, constipation and diarrhea.   Genitourinary:  Negative for dysuria, frequency and vaginal bleeding.   Musculoskeletal:  Negative for joint swelling.   Skin:  Negative for rash.   Neurological:  Negative for weakness, light-headedness and headaches.   Psychiatric/Behavioral:  Negative for sleep disturbance.         Some issues with focus  Moved office outside of home- it is better           Objective:      Physical Exam  Vitals and nursing note reviewed.   Constitutional:       Appearance: She is well-developed.   HENT:      Head: Normocephalic and atraumatic.      Right Ear: External ear normal.      Left Ear: External ear normal.      Nose: Nose normal.      Mouth/Throat:      Pharynx: No oropharyngeal exudate.   Eyes:      General: No scleral icterus.     Extraocular Movements: Extraocular movements intact.      Conjunctiva/sclera: Conjunctivae normal.   Neck:      Thyroid: No thyromegaly.      Vascular: No JVD.   Cardiovascular:      Rate and Rhythm: Normal rate and regular rhythm.      Heart sounds: Normal heart sounds. No murmur heard.     No gallop.   Pulmonary:      Effort: Pulmonary effort is normal. No respiratory distress.      Breath sounds: Normal breath sounds. No wheezing.   Abdominal:      General: Bowel sounds are normal. There is no distension.      Palpations: Abdomen is soft. There is no mass.      Tenderness: There is no abdominal tenderness. There is no guarding or rebound.   Musculoskeletal:         General: No tenderness. Normal range of motion.      Cervical back: Normal range of motion and neck supple.   Lymphadenopathy:      Cervical: No cervical adenopathy.   Skin:     General:  Skin is warm.      Findings: No erythema or rash.   Neurological:      General: No focal deficit present.      Mental Status: She is alert and oriented to person, place, and time.      Cranial Nerves: No cranial nerve deficit.      Coordination: Coordination normal.   Psychiatric:         Behavior: Behavior normal.         Thought Content: Thought content normal.         Judgment: Judgment normal.             Health Maintenance Due   Topic Date Due    COVID-19 Vaccine (3 - 2023-24 season) 09/01/2023

## 2023-11-03 ENCOUNTER — PATIENT MESSAGE (OUTPATIENT)
Dept: INTERNAL MEDICINE | Facility: CLINIC | Age: 38
End: 2023-11-03
Payer: COMMERCIAL

## 2024-02-05 ENCOUNTER — PATIENT MESSAGE (OUTPATIENT)
Dept: PSYCHIATRY | Facility: CLINIC | Age: 39
End: 2024-02-05
Payer: COMMERCIAL

## 2024-02-05 ENCOUNTER — CLINICAL SUPPORT (OUTPATIENT)
Dept: PSYCHIATRY | Facility: CLINIC | Age: 39
End: 2024-02-05
Payer: COMMERCIAL

## 2024-02-05 DIAGNOSIS — R41.840 ATTENTION AND CONCENTRATION DEFICIT: ICD-10-CM

## 2024-02-05 PROCEDURE — 99499 UNLISTED E&M SERVICE: CPT | Mod: S$GLB,,, | Performed by: PSYCHOLOGIST

## 2024-02-05 PROCEDURE — 99999 PR PBB SHADOW E&M-EST. PATIENT-LVL I: CPT | Mod: PBBFAC,,,

## 2024-02-05 NOTE — PROGRESS NOTES
ADHD Clinic Orientation Seminar       Patient's attendance: Attended in Full    Group Focus: ADHD Clinic Orientation Seminar    Group Start Time:  4:00 PM  Group End Time:   4:35 PM  Groups Date: 02/05/2024   Group Topic:  Behavioral Health  Group Department: [facility]  Group Facilitators:  Sandy Canela, PhD; Daniela Serrano

## 2024-02-06 ENCOUNTER — PATIENT MESSAGE (OUTPATIENT)
Dept: PSYCHIATRY | Facility: CLINIC | Age: 39
End: 2024-02-06
Payer: COMMERCIAL

## 2024-02-19 ENCOUNTER — CLINICAL SUPPORT (OUTPATIENT)
Dept: PSYCHIATRY | Facility: CLINIC | Age: 39
End: 2024-02-19
Payer: COMMERCIAL

## 2024-02-19 DIAGNOSIS — Z13.39 ADHD (ATTENTION DEFICIT HYPERACTIVITY DISORDER) EVALUATION: Primary | ICD-10-CM

## 2024-02-19 PROCEDURE — 99499 UNLISTED E&M SERVICE: CPT | Mod: 95,,, | Performed by: PSYCHOLOGIST

## 2024-02-19 NOTE — PROGRESS NOTES
ADHD Clinic Psychosocial Pre-Screening     Interview conducted by: [facility]      ADHD Clinic Requirements -   Attended ADHD Clinic Orientation: 02/05/2025  Review and Sign ADHD Clinic Informed Consent? Yes  Review and Sign [facility] Partnership Agreement? Yes     Social History -   Born [place] raised[place]  Raised by: Biological parents   Developmental milestones: No delays reported   Siblings: one sister   Relationships with family: Reported great relationship with family   Childhood trauma, abuse, neglect: Denied  Behavioral problems/Discipline at home: Denied  Relationship:    Children: One child  Living situation: Lives with son   Adventist/spirituality: Rastafari      Work History    service: No  Current employment: Self employed,   Number of jobs: Approximately 8   Longest time at a job: 8 years   Terminations from jobs: None reported  Disability: No  Finances: stable     Legal History  Legal history: Denied history of arrests and convictions. Not currently involved in civil or criminal litigation.  Car accidents: None reported  Speeding tickets: One camera light violation   Access to guns: None reported     Education History -   Grades in elementary/middle school: Reported obtaining A's, B's, and C's.   Grades and GPA in high school: Mostly A's, B's, and C's, approximately graduated with a 3.2 GPA  Grades and GPA in college (if applicable): A's and B's unable to recall GPA  Relationships with students: Reported good relationship with peers  Relationships with teachers: Reported good relationship with professors   Extracurricular activities: Reported she did work study in college  Highest grade/degree: Bachelors of Arts & sciences from [employer]   Held back/Special education: Denied   Prior learning disabilities: Denied   Prior ADHD diagnosis: Denied  Formal psychological testing: Denied  Behavioral problems at school: Denied      Substance  Abuse -   History of substance abuse/dependence: No  Prior inpatient substance use treatment: No  Current substance use:  Alcohol: Denied   Recreational drugs: Denied  Tobacco/Nicotine: Denied  Caffeine: 1 to 2 cups of coffee, daily      Psychiatric History  Prior diagnosis: CHELSEA  Prior hospitalizations: No  History of outpatient treatment: Yes  Family history of psychiatric illness: Reported mother received Bipolar dx  Current psychiatrist? No  Current therapist? No  Current psychotropic medications? Patient was prescribed Buspirone 5mg, but reported she does not take her medication.     History of Present Illness -   HPI: Patient reported struggling with issues of focus since a child. Patient reported noticing over the last 4 years she is experiencing significant difficulties as it relates to work, parenting, and managing her personal life. Patient reported she is often distracted, unable to finish tasks, and often finds herself having to redirect self back to tasks. 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 Patient expressed she experiences difficulty planning and managing her time which is directly impacting her business, and ability to parent effectively. Patient reported in the past she felt as thought she was able to manage in the past, she is finding it harder to manage on a day to day basis.   Age of onset of symptoms: Patient reported around the age of 10 struggled with reading comprehension, procrastination,sustained attention during tasks, and managing her time. She reported due to her inability to retain information, she would often cram, or study last minute.      Exclusionary Criteria -   Absence of significant symptoms prior to age 10: No  Active substance abuse (within  12 months): No  Use of other controlled medications or substances: No -   Severe psychopathology: No  Inability to comply with ADHD Clinic: No     Self-Report Forms   PHQ-8: 7   CHELSEA-7: 7     Prior Testing or Diagnosis   Prior testing or diagnosis of ADHD? No   Records: No      The patient will be scheduled for an intake with the next available provider in the ADHD Clinic.         Xander Gómez,  Adult Psychology Doctoral Intern  [facility]

## 2024-03-07 ENCOUNTER — OFFICE VISIT (OUTPATIENT)
Dept: PSYCHIATRY | Facility: CLINIC | Age: 39
End: 2024-03-07
Payer: COMMERCIAL

## 2024-03-07 VITALS
WEIGHT: 147.94 LBS | BODY MASS INDEX: 25.25 KG/M2 | HEIGHT: 64 IN | SYSTOLIC BLOOD PRESSURE: 103 MMHG | HEART RATE: 86 BPM | DIASTOLIC BLOOD PRESSURE: 57 MMHG

## 2024-03-07 DIAGNOSIS — Z13.39 ADHD (ATTENTION DEFICIT HYPERACTIVITY DISORDER) EVALUATION: Primary | ICD-10-CM

## 2024-03-07 DIAGNOSIS — R41.840 POOR CONCENTRATION: ICD-10-CM

## 2024-03-07 PROCEDURE — 99205 OFFICE O/P NEW HI 60 MIN: CPT | Mod: S$GLB,,, | Performed by: FAMILY MEDICINE

## 2024-03-07 PROCEDURE — 1160F RVW MEDS BY RX/DR IN RCRD: CPT | Mod: CPTII,S$GLB,, | Performed by: FAMILY MEDICINE

## 2024-03-07 PROCEDURE — 1159F MED LIST DOCD IN RCRD: CPT | Mod: CPTII,S$GLB,, | Performed by: FAMILY MEDICINE

## 2024-03-07 PROCEDURE — 99999 PR PBB SHADOW E&M-EST. PATIENT-LVL III: CPT | Mod: PBBFAC,,, | Performed by: FAMILY MEDICINE

## 2024-03-07 PROCEDURE — 3074F SYST BP LT 130 MM HG: CPT | Mod: CPTII,S$GLB,, | Performed by: FAMILY MEDICINE

## 2024-03-07 PROCEDURE — 3008F BODY MASS INDEX DOCD: CPT | Mod: CPTII,S$GLB,, | Performed by: FAMILY MEDICINE

## 2024-03-07 PROCEDURE — 3078F DIAST BP <80 MM HG: CPT | Mod: CPTII,S$GLB,, | Performed by: FAMILY MEDICINE

## 2024-03-07 NOTE — PROGRESS NOTES
Outpatient Psychiatry Initial Visit ([facility])    3/7/2024    Sofi Vidal, a 38 y.o. female, presenting for initial evaluation visit. Met with patient.    Reason for Encounter: self-referral. Patient complains of:     Mrs Sofi Vidal is a 38 year old white female with medical hx of mixed hyperlipidemia, iron deficiency. Mental health hx of Generalized anxiety, depression and poor concentration. States feels like she can't get organized or ahead of herself to get things done, states time management and organization are not her strong points. 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 Patient states she doesn't know if she has anxiety or ADHD and would like to do a more in dept testing to see if she has ADHD or anxiety because she wants the treatment that will help what her dx.   States it is very stressful at times being a single mother. States  passed away and she has a son who has anxiety and eczema which can cause him and her stress.     Denies SI, HI, AH, VH  Denies assess to a gun in the home.     History of Present Illness:   States mood is good  Depression 2/10  Anxiety 3/10          Standardized Screenings tools:   CHELSEA- 7: Mild Anxiety- 6   Adult ADHD Self-Report Scale: Part A:16 Part B: 32    9909    Testing Status     Was test performed? Yes   Generalized Anxiety Disorder 7-item (CHELSEA-7) Scale. HOW OFTEN DURING THE PAST 2 WEEKS HAVE YOU FELT BOTHERED BY:     1. Feeling nervous, anxious, or on edge? Several days   2. Not being able to stop or control worrying? Several days   3. Worrying too much about different things? Several days   4. Trouble relaxing? Several days   5. Being so restless that it is hard to sit still? Not at all   6. Becoming easily annoyed or irritable? Several days   7. Feeling afraid as if something awful  might happen? Several days   8. If you checked off any problems, how difficult have these problems made it for you to do your work, take care of things at home, or get along with other people? Somewhat difficult   CHELSEA-7 Score 6   Number answered (out of first 7) 7   Interpretation Mild Anxiety       1331  ADULT ADHD Part A    How often do you have trouble wrapping up the final details of a project once the challenging parts have been done?Often How often do you have difficulty getting things in order when you have to do a task that requires organization?Very often How often do you have problems remembering appointments or obligations?Often When you have a task that requires a lot of thought, how often do you avoid or delay getting started?Very often How often do you fidget or squirm with your hands or feet when you have to sit down for a long time?Rarely How often do you feel overly active and compelled to do things, like you were driven by a motor?Rarely Part A Score 16  ADULT ADHD Part B    How often do you make careless mistakes when you have to work on a boring or difficult project?Sometimes How often do you have difficulty keeping your attention when you are doing boring or repetitive work?Very often How often do you have difficulty concentrating on what people say to you, even when they are speaking to you directly?Very often How often do you misplace or have difficulty finding things at home or at work?Very often How often are you distracted by activity or noise around you?Very often How often do you leave your seat in meetings or other situations in which you are expected to remain seated?Never How often do you feel restless or fidgety?Never How often do you have difficulty unwinding and relaxing when you have time to yourself?Very often How often do you find yourself talking too much when you are in social situations?Sometimes When you're in a conversation, how often do you find yourself finishing the  sentences of the people you are talking to before they can finish them themselves?Often How often do you have difficulty waiting your turn in situations when turn taking is required?Often How often do you interrupt others when they are busy?Sometimes Part B Score 32        Stressors:  Business owner running of the business, . Being a single mom son struggles with anxiety and eczema. Feeling overwhelmed    Support system: Parents    Hobbies: Paint, go to the gym, hang out with friends.     History:     Past Psychiatric History:   Previous therapy: yes in the past   Previous psychiatric treatment and medication trials: yes - Buspar states it made her feel weird   Previous psychiatric hospitalizations: no  Previous diagnoses: yes - anxiety   Previous suicide attempts: no  History of violence: no  Currently in treatment with [facility].  Suicidal Ideation: Denies   Auditory Hallucination:Denies   Visual Hallucination:Denies   Education: post college graduate work or degree    Social History:  Housing: House   Lives with: son   Marital status:   Children: son 9 years old   Education:   Special Ed:  Legal:Denies   Employment: Owns business   Access to gun:Denies   Hx of abuse:Denies     Substance Abuse History:  Recreational drugs: Denies   Alcohol: every 3 to 4 months   Tobacco use: Denies   Rehab:Denies     Family Hx:   Mom bipolar, depression     Neuro Hx  Seizure:Denies   Head trauma/TBI:Denies       Review Of Systems:     Medical Review Of Systems:  Pertinent positives noted in HPI    Psychiatric Review Of Systems:  Sleep: off and on bed 9 pm wakes up once or twice throughout the night. Sometimes it takes awhile. Waked up around 6 am   Appetite changes: no  Weight changes: no  Energy: Good in the mor crash in afternoon   Anhedonia no  Somatic symptoms: no  Anxiety/panic: yes  Guilty/hopeless: no  Self-injurious behavior/risky behavior: no  Any drugs: no  Alcohol: no       Current Evaluation:        Mental Status Evaluation:  Appearance:  unremarkable, age appropriate, well dressed, neatly groomed   Behavior:  normal, cooperative, friendly and cooperative   Speech:  no latency; no press, soft   Mood:  steady   Affect:  congruent and appropriate   Thought Process:  normal and logical   Thought Content:  normal, no suicidality, no homicidality, delusions, or paranoia   Sensorium:  grossly intact, person, place, situation, time/date, day of week, month of year, year   Cognition:  grossly intact and fund of knowledge 4 of 4 recent presidents   Insight:  intact   Judgment:  behavior is adequate to circumstances, age appropriate     Physical/Somatic Complaints   The patient lists: no physical complaints.    Constitutional  unremarkable, age appropriate, well dressed, neatly groomed       Laboratory Data  No visits with results within 1 Month(s) from this visit.   Latest known visit with results is:   Lab Visit on 10/19/2023   Component Date Value Ref Range Status    WBC 10/19/2023 5.67  3.90 - 12.70 K/uL Final    RBC 10/19/2023 4.55  4.00 - 5.40 M/uL Final    Hemoglobin 10/19/2023 13.4  12.0 - 16.0 g/dL Final    Hematocrit 10/19/2023 42.7  37.0 - 48.5 % Final    MCV 10/19/2023 94  82 - 98 fL Final    MCH 10/19/2023 29.5  27.0 - 31.0 pg Final    MCHC 10/19/2023 31.4 (L)  32.0 - 36.0 g/dL Final    RDW 10/19/2023 12.4  11.5 - 14.5 % Final    Platelets 10/19/2023 238  150 - 450 K/uL Final    MPV 10/19/2023 11.7  9.2 - 12.9 fL Final    Immature Granulocytes 10/19/2023 0.2  0.0 - 0.5 % Final    Gran # (ANC) 10/19/2023 3.1  1.8 - 7.7 K/uL Final    Immature Grans (Abs) 10/19/2023 0.01  0.00 - 0.04 K/uL Final    Lymph # 10/19/2023 2.3  1.0 - 4.8 K/uL Final    Mono # 10/19/2023 0.3  0.3 - 1.0 K/uL Final    Eos # 10/19/2023 0.0  0.0 - 0.5 K/uL Final    Baso # 10/19/2023 0.04  0.00 - 0.20 K/uL Final    nRBC 10/19/2023 0  0 /100 WBC Final    Gran % 10/19/2023 54.0  38.0 - 73.0 % Final    Lymph % 10/19/2023 40.2  18.0 - 48.0  % Final    Mono % 10/19/2023 4.4  4.0 - 15.0 % Final    Eosinophil % 10/19/2023 0.5  0.0 - 8.0 % Final    Basophil % 10/19/2023 0.7  0.0 - 1.9 % Final    Differential Method 10/19/2023 Automated   Final    Sodium 10/19/2023 138  136 - 145 mmol/L Final    Potassium 10/19/2023 3.8  3.5 - 5.1 mmol/L Final    Chloride 10/19/2023 102  95 - 110 mmol/L Final    CO2 10/19/2023 21 (L)  23 - 29 mmol/L Final    Glucose 10/19/2023 63 (L)  70 - 110 mg/dL Final    BUN 10/19/2023 12  6 - 20 mg/dL Final    Creatinine 10/19/2023 0.7  0.5 - 1.4 mg/dL Final    Calcium 10/19/2023 9.2  8.7 - 10.5 mg/dL Final    Total Protein 10/19/2023 7.1  6.0 - 8.4 g/dL Final    Albumin 10/19/2023 3.9  3.5 - 5.2 g/dL Final    Total Bilirubin 10/19/2023 0.8  0.1 - 1.0 mg/dL Final    Alkaline Phosphatase 10/19/2023 61  55 - 135 U/L Final    AST 10/19/2023 21  10 - 40 U/L Final    ALT 10/19/2023 22  10 - 44 U/L Final    eGFR 10/19/2023 >60.0  >60 mL/min/1.73 m^2 Final    Anion Gap 10/19/2023 15  8 - 16 mmol/L Final    Cholesterol 10/19/2023 219 (H)  120 - 199 mg/dL Final    Triglycerides 10/19/2023 109  30 - 150 mg/dL Final    HDL 10/19/2023 75  40 - 75 mg/dL Final    LDL Cholesterol 10/19/2023 122.2  63.0 - 159.0 mg/dL Final    HDL/Cholesterol Ratio 10/19/2023 34.2  20.0 - 50.0 % Final    Total Cholesterol/HDL Ratio 10/19/2023 2.9  2.0 - 5.0 Final    Non-HDL Cholesterol 10/19/2023 144  mg/dL Final    TSH 10/19/2023 2.115  0.400 - 4.000 uIU/mL Final    Vitamin B-12 10/19/2023 495  210 - 950 pg/mL Final         Medications  Outpatient Encounter Medications as of 3/7/2024   Medication Sig Dispense Refill    cholecalciferol, vitamin D3, (VITAMIN D3) 25 mcg (1,000 unit) capsule Take 1 capsule (1,000 Units total) by mouth once daily. 90 capsule 3    cyanocobalamin (VITAMIN B-12) 1000 MCG tablet Take 1 tablet (1,000 mcg total) by mouth once daily. 30 tablet 12    LORazepam (ATIVAN) 0.5 MG tablet Take 1 tablet (0.5 mg total) by mouth daily as needed for  Anxiety. 30 tablet 0    norgestimate-ethinyl estradioL (TRI-SPRINTEC, 28,) 0.18/0.215/0.25 mg-35 mcg (28) tablet Take 1 tablet by mouth once daily. 84 tablet 4     No facility-administered encounter medications on file as of 3/7/2024.           Assessment - Diagnosis - Goals:     Impression: Sofi Vidal, a 38 y.o. female, presenting for initial evaluation visit.Patient states she is concerned about poor concentration, and anxiety.      Diagnosis: Generalized Anxiety, Poor Concentration     Strengths and Liabilities: Strength: Patient accepts guidance/feedback, Strength: Patient is expressive/articulate., Strength: Patient is motivated for change., Strength: Patient is physically healthy., Strength: Patient has reasonable judgment.    Treatment Goals:    Anxiety: acquiring relapse prevention skills and Poor Concentration     Treatment Plan/Recommendations:   Referral for further treatment to ADHD Testing     Discussed diagnosis, risks and benefits of proposed treatment above vs alternative treatments vs no treatment, and potential side effects of these treatments, and the inherent unpredictability of individual response to treatment.The patient expresses understanding and gives informed consent to pursue treatment at this time believing that the potential benefits out weight the potential risks. Patient has no other questions. Risks/adverse effects discussed at this time including but not limited to: GI side effects, sexual dysfunction, activation vs sedation, triggering of suicidal thoughts, and serotonin syndrome.  I discussed with the patient the risks of Extrapyramidal Side Effects (dystonia, akathisia, parkinsonism), Metabolic syndrome (including Hyperglycemia, hyperlipidemia), Neuroleptic Malignant syndrome (fever, muscle rigidity, autonomic instability), Orthostatic hypotension, Tardive Dyskinesia with antipsychotic use.  Patient voices understanding and agreement with this plan  Provided crisis  numbers  Encouraged patient to keep future appointments.  Instructed patient to call or message with questions  In the event of an emergency, including suicidal ideation, patient was advised to go to the emergency room      Return to Clinic:  After ADHD testing     Total time: 65 minutes with more than 50% of time spent counseling and/or coordinating care.  (which included pts differential diagnosis and prognosis for psychiatric conditions, risks, benefits of treatments, instructions and adherence to treatment plan, risk reduction, reviewing current psychiatric medication regimen, medical problems and social stressors. In addition to possible discussion with other healthcare provider/s)    Kari Masterson, ANNETTE Nurse Practitioner, Psychiatry

## 2024-03-11 PROBLEM — R41.840 POOR CONCENTRATION: Status: ACTIVE | Noted: 2024-03-11

## 2024-03-13 ENCOUNTER — PATIENT MESSAGE (OUTPATIENT)
Dept: PSYCHIATRY | Facility: CLINIC | Age: 39
End: 2024-03-13
Payer: COMMERCIAL

## 2024-03-13 NOTE — TELEPHONE ENCOUNTER
Kavita Whiteside - Please schedule this patient for testing with Julieth and an interview with me.    Thanks,    JR

## 2024-03-14 ENCOUNTER — TELEPHONE (OUTPATIENT)
Dept: PSYCHIATRY | Facility: CLINIC | Age: 39
End: 2024-03-14
Payer: COMMERCIAL

## 2024-03-20 ENCOUNTER — PATIENT MESSAGE (OUTPATIENT)
Dept: INTERNAL MEDICINE | Facility: CLINIC | Age: 39
End: 2024-03-20
Payer: COMMERCIAL

## 2024-03-20 NOTE — TELEPHONE ENCOUNTER
Please send her [employer] message letting her know that we do not have any documentation of her measles, mumps, rubella or meningitis vaccines.    It is probable that her pediatrician has this.  She may have some records from when she was a child.    We have documentation of a tetanus booster in 2014, a few flu vaccines and her 2 COVID vaccines only.

## 2024-03-25 ENCOUNTER — OFFICE VISIT (OUTPATIENT)
Dept: PSYCHIATRY | Facility: CLINIC | Age: 39
End: 2024-03-25
Payer: COMMERCIAL

## 2024-03-25 ENCOUNTER — PATIENT MESSAGE (OUTPATIENT)
Dept: PSYCHIATRY | Facility: CLINIC | Age: 39
End: 2024-03-25
Payer: COMMERCIAL

## 2024-03-25 DIAGNOSIS — R41.840 INATTENTION: ICD-10-CM

## 2024-03-25 DIAGNOSIS — F41.1 GENERALIZED ANXIETY DISORDER: ICD-10-CM

## 2024-03-25 DIAGNOSIS — Z13.39 ADHD (ATTENTION DEFICIT HYPERACTIVITY DISORDER) EVALUATION: Primary | ICD-10-CM

## 2024-03-25 PROCEDURE — 96130 PSYCL TST EVAL PHYS/QHP 1ST: CPT | Mod: 95,,, | Performed by: PSYCHOLOGIST

## 2024-03-25 PROCEDURE — 96138 PSYCL/NRPSYC TECH 1ST: CPT | Mod: 95,,, | Performed by: PSYCHOLOGIST

## 2024-03-25 PROCEDURE — 96131 PSYCL TST EVAL PHYS/QHP EA: CPT | Mod: 95,,, | Performed by: PSYCHOLOGIST

## 2024-03-25 PROCEDURE — 90791 PSYCH DIAGNOSTIC EVALUATION: CPT | Mod: 95,,, | Performed by: PSYCHOLOGIST

## 2024-03-25 PROCEDURE — 96139 PSYCL/NRPSYC TST TECH EA: CPT | Mod: 95,,, | Performed by: PSYCHOLOGIST

## 2024-03-25 NOTE — LETTER
March 25, 2024        Dept. Of Psychiatry             [facility] [place] [address]  [address]  [facility] [address]  Phone: [phone]   Patient: Sofi Vidal   MR Number: [id number]   YOB: 1985   Date of Visit: 3/25/2024       Dear Kari Masterson NP:    Thank you for referring Ms. Vidal for evaluation.    Please see my report for details. Based on this evaluation, I am not assigning a diagnosis of ADHD. There was insufficient evidence of childhood symptoms. Ms. Vidal denied significant distress and impairment related to childhood symptoms, her mother did report significant symptoms but only in one domain, and her friend reported symptoms starting at age 16. There were some indications of problems with inattention and vigilance on objective tests; however, there were no problems with other areas related to ADHD (immediate attention capacity, sustained attention, impulsivity). Some of her issues could be related to problems with reading comprehension and potential reading disorder, which she has noted since childhood. This was out of scope for the current evaluation.    Ms. Vidal has experienced a great deal of pressure since childhood to perform and be successful, especially given her mother's mental health issues and parents' gambling issues. It appears she has compensated for difficulties very well in her life. 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 She attended therapy in 2016 but has not had consistent treatment since then. She does not meet criteria for PTSD, but would likely benefit from trauma-informed therapy as she has had little time and space to process.    Ms. Vidal was very accepting of my assessment and  willing to attend therapy. I will enroll her in 12 weekly sessions within [facility] with me to engage in trauma-focused and CBT-based therapy. She does indeed have significant inattention and impairment without an ADHD diagnosis, and it is up to her prescribing provider whether a non-stimulant medication may be used. She has many compensatory mechanisms to deal with inattention and I am hopeful that therapy will help ease burden and pressure, but it is possible pharmacological intervention could also be helpful.      If you have questions, please do not hesitate to call me. I look forward to following Sofi along with you.    Sincerely,      Sandy Canela, PhD  Clinical Psychologist

## 2024-03-25 NOTE — LETTER
March 25, 2024        Angelica Marin MD  [address]  [facility] [address]             [facility] [address]  [address]  [place] [address]  Phone: [phone]   Patient: Sofi Vidal   MR Number: [id number]   YOB: 1985   Date of Visit: 3/25/2024       Dear Angelica Marin MD:    Ms. Sofi Vidal requested I reach out to you regarding her ADHD evaluation results. She attended our ADHD orientation seminar, pre-screen with psychology intern, and initial evaluation with a psychiatric NP before being referred for psychological testing.    Please see my report for details. Based on this evaluation, I am not assigning a diagnosis of ADHD. There was insufficient evidence of childhood symptoms. Ms. Vidal denied significant distress and impairment related to childhood symptoms, her mother did report significant symptoms but only in one domain, and her friend reported symptoms starting at age 16. There were some indications of problems with inattention and vigilance on objective tests; however, there were no problems with other areas related to ADHD (immediate attention capacity, sustained attention, impulsivity). Some of her issues could be related to problems with reading comprehension and potential reading disorder, which she has noted since childhood. This was out of scope for the current evaluation.    Ms. Vidal has experienced a great deal of pressure since childhood to perform and be successful, especially given her mother's mental health issues and parents' gambling issues. It appears she has compensated for difficulties very well in her life. Starting in 2615-6234, she experienced significant trauma and stressors including her 's cancer diagnosis, moving to [place] for his treatment, quitting her long-term job she enjoyed, having a child, losing her , dealing with litigation with in-laws, and having potential Guillain-[place]. She attended therapy  in 2016 but has not had consistent treatment since then. She does not meet criteria for PTSD, but would likely benefit from trauma-informed therapy as she has had little time and space to process.    Ms. Vidal was very accepting of my assessment and willing to attend therapy. I will enroll her in 12 weekly sessions within [facility] with me to engage in trauma-focused and CBT-based therapy. She does indeed have significant inattention and impairment without an ADHD diagnosis, and it is up to her prescribing provider whether a non-stimulant medication may be used. She has many compensatory mechanisms to deal with inattention and I am hopeful that therapy will help ease burden and pressure, but it is possible pharmacological intervention could also be helpful.      If you have questions, please do not hesitate to call me. I look forward to following Sofi along with you.    Sincerely,      Sandy Canela, PhD  Clinical Psychologist

## 2024-03-25 NOTE — PROGRESS NOTES
Virtual Visit  The patient location is: [address] [place] [address], [address]  The chief complaint leading to consultation is: ADHD Evaluation    Visit type: audiovisual    Face to Face time with patient: 60 minutes of total time spent on the encounter, which includes face to face time and non-face to face time preparing to see the patient (eg, review of tests), Obtaining and/or reviewing separately obtained history, Documenting clinical information in the electronic or other health record, Independently interpreting results (not separately reported) and communicating results to the patient/family/caregiver, or Care coordination (not separately reported). Testing codes and duration below.    Each patient to whom he or she provides medical services by telemedicine is:  (1) informed of the relationship between the physician and patient and the respective role of any other health care provider with respect to management of the patient; and (2) notified that he or she may decline to receive medical services by telemedicine and may withdraw from such care at any time.    Notes: N/A      [facility] [address] [place] [address]  [phone]    REPORT OF PSYCHOLOGICAL TESTING - ADULT ADHD TESTING    NAME: Sofi Vidal  MRN: [id number]  : 1985     REFERRED BY: Kari Masterson NP    REASON FOR REFERRAL: Psychological Evaluation of Adult ADHD    EVALUATED BY:  Sandy Canela, Ph.D., Clinical Psychologist  Julieth Johnson B.S., Psychometrician    DATE OF EVALUATION: 2024    EVALUATION PROCEDURES AND TIMES:  Conducted by Psychologist (2 hours):  Integration of patient data, interpretation of standardized test results and clinical data, clinical decision-making, treatment planning and report, and feedback to the referring provider  CPT Codes: 36205 - 1 hour; 16095 - 1 hour  Conducted by Technician (1 hour, 30 minutes):  Psychological test administration and scoring by technician, two or more  tests, any method: Marti Adult ADHD Rating Scale - IV (BAARS-IV), California Verbal Learning Test, Third Edition (CVLT3); Wender [place] Rating Scale for Attention-Deficit/Hyperactivity Disorder (WURS); Jovon' Continuous Performance Test 3rd Edition (CPT 3); Jovon' Adult ADHD Rating Scales: Short Version (CAARS-S:S); Personal Health Questionnaire Depression Scale (PHQ-8); Generalized Anxiety Disorder-7 (CHELSEA-7)  CPT Codes: 35997 - 30 minutes; 37632 - 30 minutes    REFERRAL INFORMATION:  Ms. Sofi Vidal is a 38-year-old female who was referred for a psychological evaluation of Adult ADHD by her psychiatric nurse practitioner. Ms. Vidal attended the Adult ADHD Clinic Orientation Seminar on 02/05/2024, the Psychosocial Pre-Screen on 02/19/2024, and an initial intake evaluation with Kari Masterson NP, on 03/07/2024.     Ms. Vidal has no history of ADHD diagnosis, learning disabilities, or formal psychological testing. Currently, she reports heightened awareness of potential ADHD symptoms over the past four years relating to work, parenting, and managing her personal life. She reports various symptoms of inattention and impairment related to it, but does not indicate much impulsivity or hyperactivity. She noted her symptoms started around the age of 10.     Ms. Vidal provided retrospective reports for childhood symptoms. She denies having hyperactivity and impulsivity during childhood. She had difficulties with reading comprehension throughout childhood, but denied significant impairment in academic performance. She denied problems during childhood with school, family, legal issues, home responsibilities, social life, leisure activities, or health and safety. She felt a lot of pressure to perform and behave, and to not be a burden on anyone else. Her parents worked until the evening and left her and her sister for several hours before dinner. She never experienced significant financial hardships, but she started  being aware of financial concerns around the 2nd or 3rd grade. Her parents had gambling problems and her mother was diagnosed with Bipolar Disorder. She felt a strong sense of cohesion in her family.    Ms. Vidal started to notice more difficulties in the 8th and 9th grades. Things were harder for her than other people. She had difficulty with organization, time management, and reading comprehension. She started having more difficulty with greater responsibilities. She had mild impairment in school such as demerits, but she never missed a class or failed a grade. Although her friends joked with her about her tardiness, she denied significant impairment in social relationships. She denied significant problems with family, legal issues, home responsibilities, social life, leisure activities, or health and safety. She joined more extracurricular activities including student [employer], managing the basketball team, and the swim team. Her parents were minimally involved in high school.    Ms. Vidal reported trauma-related symptoms from trauma/stress related to multiple psychosocial stressors ('s diagnosis, leaving her job, move to [place] for 's treatment, having a child, losing her , dealing with litigation from her in-laws, and potentially being diagnosed with Guillain-[place]). She endorsed intrusions (2-3x/month) and hyperarousal (irritability and defensiveness, feeling on edge, difficulty concentrating). She denied avoidance and negative mood and beliefs.     Ms. Vidal is motivated to gain a sense of control and focus. She wants to be more productive so she can relax. She loves orderliness and completing to-do lists, and she would like to expend less energy engaging in compensatory mechanisms. She would like to be more at peace and feel more energized.     BACKGROUND INFORMATION:  Early Development. Ms. Vidal reported no significant complications regarding early development. She estimated meeting her  developmental milestones within a typical time frame. She was born and raised in [place], [place].    Education. Ms. Vidal denied significant academic difficulties during elementary and middle school. She obtained A's, B's, and C's throughout grade school. She was never held back or enrolled in special education. She denied significant behavioral problems at school. She endorsed good relationships with her peers and teachers. Ms. Vidal earned a Bachelor's degree from [employer]     Family and Social. During childhood, Ms. Vidal was raised along with one sister. She reported great relationships with her family. She denied history of childhood trauma, abuse, and neglect. She denied significant behavioral problems or discipline at home. Starting in 2015 she had significant psychosocial stressors including her 's diagnosis, leaving her job, move to [place] for 's treatment, having a child, losing her , dealing with litigation from her in-laws, and potentially being diagnosed with Guillain-[place]. Currently, Ms. Vidal lives with her son. She is a  ( passed away in 2016 due to cancer) and currently open to dating (but not in a relationship). Druze is important to her and she identifies as Mosque. She reports good relationships with her friends and family. She enjoys leisure activities including painting, playing with her son, playing sports, spending time with friends, and journaling.    Occupational, Financial, and Legal. Ms. Vidal is currently self-employed as a  at her own business, which is at risk for closing due to financial problems. 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 She worked at [employer] as a , and she left this position because of psychosocial stressors. She  denied a history of employment terminations and disability. Finances are currently stable for her. Ms. Vidal denied history of arrests, convictions, and litigation. She has no history of motor vehicle accidents and has only had one speeding ticket (camera ticket due to a light violation).    Medical and Mental Health. Ms. Vidal's medical record revealed the following diagnoses: Guillain-[place], Mixed hyperlipidemia; Vitamin D insufficiency; B12 deficiency (now resolved). Her family mental health history is positive for depression and anxiety (mother). Her medical record reveals anxiety starting in 2014 ('s diagnosis), 2015 (postpartum and 's illness), and depression/grief in 2016 (lost  to cancer while parenting 16-month-old). 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 She attended grief counseling for 1.5 years starting in 2016 (outside of [facility], here and there). In 2020, she attended one pharmacotherapy visit with resident Dr. Li and one psychotherapy intake with Devonte Greenfield LCSW. She was previously prescribed Ativan by her PCP (Dr. Marin) in 2022 for anxiety, and had a history of taking Lexapro with benefit. She was referred to Eriberto Rodrigues NP, on 08/01/2023 for potential ADHD treatment, but was diagnosed with Generalized Anxiety Disorder and prescribed Buspar. She re-entered our department with the ADHD Clinic in 2024. She denied problems with substance abuse, suicidal or homicidal ideation, and psychotic symptoms.    TEST DATA:  All tests were administered according to standardized procedures and were selected based on the reason for referral. Ms. Vidal signed a test approach agreement to approach testing with honest, genuine effort.      CVLT3. The California Verbal Learning Test, Third Edition (CVLT3) is a measure of verbal learning that is used to assess ADHD. There were no validity concerns regarding effort.   TEST RESULTS. Ms.  Young's profile revealed no significant problems regarding immediate attention capacity. With novel verbal data to attend to, she ranked in the High Average range for Trial 1 of List A and in the Average range for List B.    CPT 3. The Jovon Continuous Performance Test 3rd Edition (CPT 3) is a computerized assessment of ADHD-related problems including inattentiveness, impulsivity, sustained attention, and vigilance. There were no validity concerns regarding effort.   TEST RESULTS. Ms. Vidal's performance revealed two atypical scores, which is associated with a moderate likelihood of having a disorder characterized by attention deficits (such as ADHD or other psychological/neurological conditions). Her profile revealed some indications of problems with inattention and vigilance; however, there were no indications of problems with sustained attention or impulsivity.    SELF-REPORT MEASURES.  WURS. The Wender [place] Rating Scale (WURS) is a retrospective self-report measure to evaluate the presence and severity of childhood symptoms of ADHD. She self-reported clinically non-significant symptoms related to ADHD in childhood.  CAARS-S:S. The Jovon' Adult ADHD Rating Scales: Short Version is a self-report measure to assess ADHD-related symptoms. Her profile indicated significant inattention but non-significant hyperactivity, impulsivity, problems with self-concept, and overall ADHD.  PHQ-8. Her score on the PHQ-8 was a 7, which indicates mild depressive symptoms.  CHELSEA-7. Her score on the CHELSEA-7 was a 7, which indicates mild anxiety.    OTHER REPORTS. Ms. Vidal provided verbal and written consent to contact her friends to complete forms regarding her childhood and current functioning. The other-report for current symptoms was not returned.  BAARS-IV: OTHER-REPORT: CHILDHOOD. Ms. Vidal's mother endorsed significant childhood symptoms of inattention, hyperactivity-impulsivity, and total ADHD (at the 86th, 93rd, and 91st  percentiles, respectively). She endorsed that her symptoms only affected school.  BAARS-IV: OTHER-REPORT: CURRENT. Ms. Vidal's friend/coworker endorsed significant current symptoms of inattention, hyperactivity, and total ADHD (at the 99th, 80th, and 93rd percentiles, respectively). She reported non-significant symptoms of impulsivity. She endorsed that her symptoms started around age 16 and currently affect home, work, and social functioning.    DIAGNOSTIC IMPRESSIONS:    ICD-10-CM ICD-9-CM   1. ADHD (attention deficit hyperactivity disorder) evaluation  Z13.39 V79.8   2. Inattention  R41.840 799.51   3. Generalized anxiety disorder  F41.1 300.02   R/O Other Trauma- and Stressor-Related Disorder      SUMMARY AND RECOMMENDATIONS:  Ms. Vidal was referred for psychological evaluation by her psychiatric nurse-practitioner Kari Masterson NP, due to reported symptoms of ADHD. A review of her record and current test results revealed inconsistent evidence related to ADHD. In her prior visits within the Adult ADHD Clinic, she noted some symptoms related to ADHD in childhood but denied significant distress or impairment. Her subjective self-reports indicated non-significant symptoms during childhood. Although her mother indicated significant symptoms related to ADHD in childhood, she noted her symptoms only impaired her functioning at school.  For current symptoms, her subjective reports indicated significant inattention but non-significant impulsivity, hyperactivity, and overall ADHD. Her friend endorsed significant inattention, hyperactivity, and ADHD; however, she noted symptoms started around age 16. Her objective test results revealed some indications of problems with inattention and vigilance on the continuous performance test; however, there were no indications of problems with impulsivity or sustained attention. There was also no indication of problems with immediate attention capacity on a verbal memory  test.    Regarding mental health history, a review of Ms. Vidal's medical record revealed a history of anxiety, depression, and grief (which she confirmed in this evaluation). Ms. Vidal started experiencing symptoms of anxiety in 2014, and over the next five years experienced loss (her 's death) and multiple stressors (having a child, starting her own business, litigation with in-laws, being diagnosed with Guillan-[place]). She attended grief counseling for over one year in 2016, and was prescribed psychotropic medication by her PCP and a few psychiatric providers at [facility] within the past four years.    Based on this evaluation, Ms. Vidal will not be assigned a diagnosis of ADHD. Based on our diagnostic manual, ADHD is considered a neurodevelopmental disorder requiring evidence of significant distress and impairment in two or more domains with symptoms starting prior to age 12. There is insufficient evidence to support clinically significant distress and impairment during childhood. Ms. Vidal did not endorse significant symptoms, and although her mother endorsed significant symptoms it appeared limited to one domain (school). There was also inconsistent evidence to fulfill criteria of ADHD currently. Ms. Vidal indicates significant inattention, but the diagnosis of ADHD also requires symptom presentation in impulsivity/hyperactivity. Her friend indicated significant inattention and hyperactivity, but noted no impulsivity.    It is clear that Ms. Vidal is experiencing inattentiveness with impairment in work and home functioning. Although she will not be diagnosed with ADHD, it is possible a non-stimulant medication for inattention could be helpful for her given her functional impairment. I am not a prescribing provider, so this will be up to her PCP or psychiatric NP. Given her reports and history, I will assign a rule-out diagnosis of Other Trauma- and Stressor-Related Disorder and continue the  diagnosis of Generalized Anxiety Disorder. She will enroll in short-term therapy via the [facility] with me starting on 04/01/2024 with a trauma-focused and CBT approach. A summary of this evaluation will be sent to her, her PCP, and her psychiatric NP.          Report and interpretation and coding were completed on 03/25/2024.                Sandy Canela, PhD  Clinical Psychologist

## 2024-03-25 NOTE — LETTER
"     March 25, 2024    Sofi Vidal  [address]  [place] [address]             [facility] [address]  [address]  [facility] [address]  Phone: [phone] Dear Ms. Sofi Vidal:    It was a pleasure meeting you today for your ADHD evaluation, and I look forward to working with you in therapy! I wanted to share a summary of the evaluation results, my diagnostic impressions, and my treatment recommendations.    I am not assigning a diagnosis of ADHD. I am continuing the diagnosis of Generalized Anxiety Disorder in your chart, and I have a rule-out diagnosis of "Other Trauma- and Stressor-Related Disorder." This does not go in your problem list, but is in the note. I am hoping with further assessment in your future sessions with me we will be able to see whether it is indeed appropriate to assign for now.    In terms of the diagnosis of ADHD, it requires significant distress and impairment in two or more domains in childhood prior to age 10. There was insufficient and inconsistent evidence to support this given your reports about your functioning and impairments, as well as your mother's reports of problems only in school functioning and your friend estimating symptoms starting at age 16. When I was querying about these symptoms potentially going undercover, your responses were the opposite of what we typically see in those cases. You actually did have a demanding environment, you managed most things without the assistance of your parents, and your parents were not dismissive of you.     Additionally, your current reports indicated significant inattention but no significant symptoms in other required criteria of hyperactivity and impulsivity (which is still required in adults!). That led me to query other potential areas that may have contributed to your symptoms and struggles. (The only thing I did not explore was potential Learning Disorder (Reading) - this was not a " psychoeducational evaluation so that was out of the scope.) It seems that you have functioned appropriately despite inner pressures and stressors, but you went through a tremendous amount of trauma and stress over the past eight years. At this time you did not meet criteria for full PTSD, but I highly suspect that trauma and stress has had a lasting impact on your functioning and performance. We can see cognitive impairments when patients have only 1 or 2 of the trauma/stress you experienced... and you had 6 or 7 in a row!     Therefore, I am recommending you to attend structured trauma-focused and CBT-based therapy to process past trauma/stress, explore any lasting impacts on thoughts and behaviors currently, and promote adaptive coping. My belief is that trauma-focused treatment can reduce the heavy burden of the last eight years and promote more peace, attentiveness, and focus. I specialize in trauma and PTSD and it is one of our most debilitating conditions - so if you have any symptoms under this diagnosis it could certainly be causing problems for you.    There were indications of problems with inattention and vigilance on objective testing, and I sent the report to your primary care physician (Dr. Marin) and psychiatric nurse practitioner (Kari Masterson NP). I am not a prescribing provider so I did not give any overt recommendations, but I deferred to them whether they consider a non-stimulant medication to help you with your symptoms. I also let them know we will be working in therapy together.      If you have any questions or concerns, please don't hesitate to call. Otherwise, I will see you  next week in therapy!      Sincerely,      Sandy Canela, PhD  Clinical Psychologist

## 2024-03-26 NOTE — PROGRESS NOTES
ADULT ADHD CLINIC  Outpatient Psychiatry Initial Visit ([facility] - NPs/PA-Cs)    3/26/2024    Sofi Vidal, a 38 y.o. female, for initial evaluation visit.  Patient is referred by Angelica Marin MD ***.      Reason for Encounter: ADHD evaluation    HPI:  Patient reported struggling with issues of focus since a child. Patient reported noticing over the last 4 years she is experiencing significant difficulties as it relates to work, parenting, and managing her personal life. Patient reported she is often distracted, unable to finish tasks, and often finds herself having to redirect self back to tasks. 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 Patient expressed she experiences difficulty planning and managing her time which is directly impacting her business, and ability to parent effectively. Patient reported in the past she felt as thought she was able to manage in the past, she is finding it harder to manage on a day to day basis.       History:   [facility] REVIEW PSYCHOLOGY INTERN NOTE WITH PATIENT FOR ACCURACY. COPY AND PASTE HERE.  ADHD Clinic Psychosocial Pre-Screening     Interview conducted by: Cheryl Braswell Psychology      ADHD Clinic Requirements -   Attended ADHD Clinic Orientation: 02/05/2025  Review and Sign ADHD Clinic Informed Consent? Yes  Review and Sign [facility] Partnership Agreement? Yes     Social History -   Born & raised[place]  Raised by: Biological parents   Developmental milestones: No delays reported   Siblings: one sister   Relationships with family: Reported great relationship with family   Childhood trauma, abuse, neglect: Denied  Behavioral problems/Discipline at home: Denied  Relationship:    Children: One child  Living situation:  Lives with son   Anabaptism/spirituality: Orthodox      Work History    service: No  Current employment: Self employed,   Number of jobs: Approximately 8   Longest time at a job: 8 years   Terminations from jobs: None reported  Disability: No  Finances: stable     Legal History  Legal history: Denied history of arrests and convictions. Not currently involved in civil or criminal litigation.  Car accidents: None reported  Speeding tickets: One camera light violation   Access to guns: None reported     Education History -   Grades in elementary/middle school: Reported obtaining A's, B's, and C's.   Grades and GPA in high school: Mostly A's, B's, and C's, approximately graduated with a 3.2 GPA  Grades and GPA in college (if applicable): A's and B's unable to recall GPA  Relationships with students: Reported good relationship with peers  Relationships with teachers: Reported good relationship with professors   Extracurricular activities: Reported she did work study in college  Highest grade/degree: Bachelors of Arts & sciences from [employer]   Held back/Special education: Denied   Prior learning disabilities: Denied   Prior ADHD diagnosis: Denied  Formal psychological testing: Denied  Behavioral problems at school: Denied      Substance Abuse -   History of substance abuse/dependence: No  Prior inpatient substance use treatment: No  Current substance use:  Alcohol: Denied   Recreational drugs: Denied  Tobacco/Nicotine: Denied  Caffeine: 1 to 2 cups of coffee, daily      Psychiatric History  Prior diagnosis: CHELSEA  Prior hospitalizations: No  History of outpatient treatment: Yes  Family history of psychiatric illness: Reported mother received Bipolar dx  Current psychiatrist? No  Current therapist? No  Current psychotropic medications? Patient was prescribed Buspirone 5mg, but reported she does not take her medication.     History of Present Illness -   HPI: Patient reported struggling with  issues of focus since a child. Patient reported noticing over the last 4 years she is experiencing significant difficulties as it relates to work, parenting, and managing her personal life. Patient reported she is often distracted, unable to finish tasks, and often finds herself having to redirect self back to tasks. 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 Patient expressed she experiences difficulty planning and managing her time which is directly impacting her business, and ability to parent effectively. Patient reported in the past she felt as thought she was able to manage in the past, she is finding it harder to manage on a day to day basis.   Age of onset of symptoms: Patient reported around the age of 10 struggled with reading comprehension, procrastination,sustained attention during tasks, and managing her time. She reported due to her inability to retain information, she would often cram, or study last minute.      Exclusionary Criteria -   Absence of significant symptoms prior to age 12: No  Active substance abuse (within 12 months): No  Use of other controlled medications or substances: No -   Severe psychopathology: No  Inability to comply with ADHD Clinic: No     Self-Report Forms   PHQ-8: 7   CHELSEA-7: 7     Prior Testing or Diagnosis   Prior testing or diagnosis of ADHD? No   Records: No     ADHD Evaluation:     SECTION I: CHILDHOOD ADHD SYMPTOMS AND IMPAIRMENT  Clinically significant symptoms of ADHD in childhood prior to age 12?  Symptom Examples   Inattention (6+) - For clinician: Inattention becomes more prominent in elementary school   1A - Carelessness    1B - Difficulty sustaining attention    1C - Not listening    1D - Not following through    1E - Difficulty  organizing    1F - Avoids sustained mental effort    1G - Loses things    1H - Easily distracted    1I - Forgetful    2A - Fidgets    2B - Leaves seat    2C - Restlessness    2D - Unable to engage quietly    2E - Driven by a motor    2F - Talks excessively    2G - Blurts out answers    2H - Difficulty waiting turn    2I - Interrupts      Clinically significant impairment related to ADHD in childhood prior to age 12?  Problem Area Examples of Impairment Frequency   For clinician: Inattention tends to lead to academic deficits, school-related problems, and peer neglect. Hyperactivity or impulsivity is related to peer rejection (sometimes accidental injury).   Problems at school  {FREQUENCY:21343}   Problems with family  {FREQUENCY:23283}   Legal problems  {FREQUENCY:40482}   Problems with home responsibilities  {FREQUENCY:63807}   Problems with social life  {FREQUENCY:18658}   Problems with leisure activities  {FREQUENCY:40535}   Problems of health or safety  {FREQUENCY:77742}     For clinician only: Impairment in 2+ domains? Severity Rating: {Desc; minimal/mild/moderate/marked:260378}  Severity Rating   Minimal Mild Moderate Severe   No or little impairment Slight difficulty in 1-2 domains;  Still generally able to meet obligations More difficulty in 2-3 domains; More problems meeting obligations Significant in 3+ domains; Failure to meet obligations OR sx pose risk to self/others     Psychiatric symptoms or disorders that could have contributed to their symptom presentation in childhood?  Symptom/Disorder Yes or No   Oppositional behavior, defiance, hostility, failure to understand tasks or instructions {YES/NO:20292}   Autism spectrum disorder {YES/NO:20292}   Reactive attachment disorder {YES/NO:20292}   Anxiety disorders {YES/NO:20292}   PTSD {YES/NO:20292}   Depressive disorders {YES/NO:20292}   Sleep disorders {YES/NO:20292}   Bipolar disorder {YES/NO:20292}   Disruptive mood dysregulation disorder  {YES/NO:20292}   Substance use problems {YES/NO:20292}   Psychotic symptoms {YES/NO:20292}   Neurocognitive problems (head trauma, TBI) {YES/NO:20292}     Psychosocial factors that could have contributed to their symptom presentation in childhood?  Factor Yes or No   SES hardship and poverty {YES/NO:20292}   Childhood trauma {YES/NO:20292}   Child maltreatment {YES/NO:20292}   Death in the family {YES/NO:20292}   Low family cohesion {YES/NO:20292}   Parental psychiatric problems {YES/NO:20292}   Parental separation {YES/NO:20292}   Parental criminality {YES/NO:20292}   Household dysfunction {YES/NO:20292}   Family incarceration {YES/NO:20292}   Parental long-term unemployment {YES/NO:20292}     Significant evidence supporting a delayed onset or diagnosis?  Factor Yes or No   Parents with dismissive attitudes toward ADHD {YES/NO:20292}   Supportive childhood environments and cognitive strengths that mitigated childhood symptoms {YES/NO:20292}   Mild ADHD symptoms + undemanding childhood {YES/NO:20292}   Other: ___________ {YES/NO:20292}       Do not query Section II (current symptoms and impairment) if the patient does not meet criteria for ADHD in childhood. Skip to Section III.      SECTION II: CURRENT ADHD SYMPTOMS AND IMPAIRMENT (Use only if clinically significant symptoms in childhood)  Standardized Screening Tools:  Adult ADHD Self-Report Scale: ____    Clinically significant symptoms of ADHD currently? You can also use the ASRS to indicate self-reported symptoms, but gather examples.  Symptom Examples   Inattention (5+)   1A - Carelessness    1B - Difficulty sustaining attention    1C - Not listening    1D - Not following through    1E - Difficulty organizing    1F - Avoids sustained mental effort    1G - Loses things    1H - Easily distracted    1I - Forgetful    2A - Fidgets    2B - Leaves seat    2C - Restlessness    2D - Unable to engage quietly    2E - Driven by a motor    2F - Talks excessively    2G -  "Blurts out answers    2H - Difficulty waiting turn    2I - Interrupts      Clinically significant impairment related to ADHD currently?  For clinician: Young adults have poor job stability. Adults have poorer occupational performance, attainment, attendance, and higher probability of unemployment and interpersonal conflict. More likely to have traffic accidents and violations.  Problem Area Examples of Impairment Frequency   Problems at school, work, or both  {FREQUENCY:08749}   Problems with family  {FREQUENCY:94592}   Legal problems  {FREQUENCY:65217}   Problems with home responsibilities  {FREQUENCY:65051}   Problems with social life  {FREQUENCY:65256}   Problems with leisure activities  {FREQUENCY:54372}   Problems of health or safety  {FREQUENCY:01200}     For clinician only: Impairment in 2+ domains? Severity Rating: {Desc; minimal/mild/moderate/marked:921269}  Severity Rating   Minimal Mild Moderate Severe   No or little impairment Slight difficulty in 1-2 domains;  Still generally able to meet obligations More difficulty in 2-3 domains; More problems meeting obligations Significant in 3+ domains; Failure to meet obligations OR sx pose risk to self/others         Other Psychiatric Evaluation:     SECTION III: PSYCHIATRIC SYMPTOMS AND TREATMENT HISTORY  Current Medications:  Scheduled Meds:  Continuous Infusions:  PRN Meds:    Past Medication Trials:  ***    Review Of Systems:     Medical Review Of Systems:  {ros; complete:50270}    Psychiatric Review Of Systems:  Sleep: {yes/no:39097::"no"}  Appetite changes: {yes/no:50557::"no"}  Weight changes: {yes/no:16259::"no"}  Energy: {yes/no:60203::"no"}  Interest/pleasure/anhedonia: {yes/no:45082::"no"}  Somatic symptoms: {yes/no:24932::"no"}  Libido: {yes/no:34902::"no"}  Anxiety/panic: {yes/no:26509::"no"}  Guilty/hopeless: {yes/no:60050::"no"}  Self-injurious behavior/risky behavior: {yes/no:41289::"no"}  Any drugs: {yes/no:20689::"no"}  Alcohol: " "{yes/no:43540::"no"}     Current Evaluation:     Mental Status Evaluation:  Appearance:  {appearance:36799::"unremarkable","age appropriate"}   Behavior:  {behavior:08313::"normal","cooperative"}   Speech:  {findings; speech psych:17562::"no latency; no press"}   Mood:  {mood:28217}   Affect:  {affect:47463::"congruent and appropriate"}   Thought Process:  {thought process:08315::"normal and logical"}   Thought Content:  {normal:56276::"normal, no suicidality, no homicidality, delusions, or paranoia"}   Sensorium:  {orientation:51936::"grossly intact"}   Cognition:  {cognition:36459::"grossly intact"}   Insight:  {insight:44012}   Judgment:  {JUDGMENT:21292::"behavior is adequate to circumstances"}       Physical/Somatic Complaints  The patient lists: {complaints:77977}    Other Pertinent Information  ***      Based on your evaluation, proceed with Option A (ADHD), Option B (Psych Testing), or Option C (Other Diagnosis) for the Wmcmsteeab-Wnmgiquhz-Abawa section.    Assessment - Diagnosis - Goals:   {ADHD Bbimdxwvjm-Exvapiuxa-Ssqyt Options:08793}  "

## 2024-03-27 ENCOUNTER — PATIENT MESSAGE (OUTPATIENT)
Dept: INTERNAL MEDICINE | Facility: CLINIC | Age: 39
End: 2024-03-27

## 2024-03-27 ENCOUNTER — TELEPHONE (OUTPATIENT)
Dept: INTERNAL MEDICINE | Facility: CLINIC | Age: 39
End: 2024-03-27
Payer: COMMERCIAL

## 2024-03-27 ENCOUNTER — OFFICE VISIT (OUTPATIENT)
Dept: INTERNAL MEDICINE | Facility: CLINIC | Age: 39
End: 2024-03-27
Payer: COMMERCIAL

## 2024-03-27 VITALS
DIASTOLIC BLOOD PRESSURE: 65 MMHG | BODY MASS INDEX: 23.9 KG/M2 | HEIGHT: 64 IN | WEIGHT: 140 LBS | SYSTOLIC BLOOD PRESSURE: 110 MMHG

## 2024-03-27 DIAGNOSIS — M54.9 BILATERAL BACK PAIN, UNSPECIFIED BACK LOCATION, UNSPECIFIED CHRONICITY: Primary | ICD-10-CM

## 2024-03-27 DIAGNOSIS — F32.0 MAJOR DEPRESSIVE DISORDER, SINGLE EPISODE, MILD: ICD-10-CM

## 2024-03-27 DIAGNOSIS — N30.00 ACUTE CYSTITIS WITHOUT HEMATURIA: ICD-10-CM

## 2024-03-27 LAB
BACTERIA #/AREA URNS AUTO: NORMAL /HPF
BILIRUB UR QL STRIP: NEGATIVE
CLARITY UR REFRACT.AUTO: CLEAR
COLOR UR AUTO: YELLOW
GLUCOSE UR QL STRIP: NEGATIVE
HGB UR QL STRIP: ABNORMAL
KETONES UR QL STRIP: ABNORMAL
LEUKOCYTE ESTERASE UR QL STRIP: ABNORMAL
MICROSCOPIC COMMENT: NORMAL
NITRITE UR QL STRIP: NEGATIVE
PH UR STRIP: 6 [PH] (ref 5–8)
PROT UR QL STRIP: NEGATIVE
RBC #/AREA URNS AUTO: 2 /HPF (ref 0–4)
SP GR UR STRIP: 1.02 (ref 1–1.03)
SQUAMOUS #/AREA URNS AUTO: 0 /HPF
URN SPEC COLLECT METH UR: ABNORMAL
WBC #/AREA URNS AUTO: 3 /HPF (ref 0–5)

## 2024-03-27 PROCEDURE — 3008F BODY MASS INDEX DOCD: CPT | Mod: CPTII,S$GLB,, | Performed by: INTERNAL MEDICINE

## 2024-03-27 PROCEDURE — 99214 OFFICE O/P EST MOD 30 MIN: CPT | Mod: S$GLB,,, | Performed by: INTERNAL MEDICINE

## 2024-03-27 PROCEDURE — 3074F SYST BP LT 130 MM HG: CPT | Mod: CPTII,S$GLB,, | Performed by: INTERNAL MEDICINE

## 2024-03-27 PROCEDURE — 81001 URINALYSIS AUTO W/SCOPE: CPT | Performed by: INTERNAL MEDICINE

## 2024-03-27 PROCEDURE — 99999 PR PBB SHADOW E&M-EST. PATIENT-LVL III: CPT | Mod: PBBFAC,,, | Performed by: INTERNAL MEDICINE

## 2024-03-27 PROCEDURE — 1159F MED LIST DOCD IN RCRD: CPT | Mod: CPTII,S$GLB,, | Performed by: INTERNAL MEDICINE

## 2024-03-27 PROCEDURE — 3078F DIAST BP <80 MM HG: CPT | Mod: CPTII,S$GLB,, | Performed by: INTERNAL MEDICINE

## 2024-03-27 RX ORDER — BUPROPION HYDROCHLORIDE 75 MG/1
75 TABLET ORAL 2 TIMES DAILY
Qty: 60 TABLET | Refills: 11 | Status: SHIPPED | OUTPATIENT
Start: 2024-03-27 | End: 2025-03-27

## 2024-03-27 RX ORDER — NAPROXEN 500 MG/1
500 TABLET ORAL 2 TIMES DAILY WITH MEALS
Qty: 30 TABLET | Refills: 1 | Status: SHIPPED | OUTPATIENT
Start: 2024-03-27 | End: 2024-04-17

## 2024-03-27 RX ORDER — METHOCARBAMOL 500 MG/1
500 TABLET, FILM COATED ORAL 3 TIMES DAILY
Qty: 40 TABLET | Refills: 1 | Status: SHIPPED | OUTPATIENT
Start: 2024-03-27 | End: 2024-04-06

## 2024-03-27 NOTE — PROGRESS NOTES
Virtual Visit  The patient location is: Home ([place])  The chief complaint leading to consultation is: Anxiety, Trauma/Stress    Visit type: audiovisual    Face to Face time with patient: 53 minutes  60 minutes of total time spent on the encounter, which includes face to face time and non-face to face time preparing to see the patient (eg, review of tests), Obtaining and/or reviewing separately obtained history, Documenting clinical information in the electronic or other health record, Independently interpreting results (not separately reported) and communicating results to the patient/family/caregiver, or Care coordination (not separately reported).     Each patient to whom he or she provides medical services by telemedicine is:  (1) informed of the relationship between the physician and patient and the respective role of any other health care provider with respect to management of the patient; and (2) notified that he or she may decline to receive medical services by telemedicine and may withdraw from such care at any time.    Notes: N/A      [facility]  Individual Psychotherapy (PhD/LCSW)    4/1/2024    Site:  [facility]         Therapeutic Intervention: Met with patient.  Outpatient - Insight oriented psychotherapy 60 min - CPT code 72174 and Outpatient - Behavior modifying psychotherapy 60 min - CPT code 20011    Chief complaint/reason for encounter: anxiety and trauma/stressor-related symptoms     Interval history and content of current session: Ms. Sofi Vidal arrived on time for her scheduled appointment.    [facility], Session 1 (Treatment Initiation)  Session Focus:  Brief check-in  Set agenda  Collect rating scales: PCL-5 - 9  Treatment Plan/Goals  Review Values and Aspirations  Intervention techniques (if able): Trauma/Stressors account  Address patient concerns  Summarize session  Feedback about session    Action Plan:  Review therapy materials  Intervention practice: Self-care, will work on  Trauma/Stressors account together in the next session  Ways to overcome obstacles: N/A      04/01/2024   Overall Treatment Plan: To live a life with a greater sense of control, direction, and peace  Values and Aspirations: Being helpful to others (of service), Being a really good Mom, Promoting independence and autonomy   Problem List / Patient's Goals and Evidence-Based Interventions (include up to 5):   Problem #1: Lack of focus and concentration, Distractibility  Goal: To have better organization and flow in my business and home life  Therapy Interventions: ___  Non-Therapy Strategies: ___   Problem #2: Constantly being in survival mode, Restlessness, anxiety, feeling on edge  Goal: To feel at peace, To have more fun and enjoyment, To be more carefree  Therapy Interventions: Trauma/Stressors Account, Impact Statement  Non-Therapy Strategies: ___   Problem #3: Belief - I am not capable  Goal: To know and trust myself that I am capable of accomplishing what I put my mind to  Therapy Interventions: ___  Non-Therapy Strategies: ___   Problem #4:  Goal: ___  Therapy Interventions: ___  Non-Therapy Strategies: ___   Problem #5:  Goal: ___  Therapy Interventions: ___  Non-Therapy Strategies: ___       Treatment plan:  Target symptoms: anxiety , trauma/stress  Why chosen therapy is appropriate versus another modality: relevant to diagnosis, evidence based practice  Outcome monitoring methods: self-report, checklist/rating scale  Therapeutic intervention type: insight oriented psychotherapy, behavior modifying psychotherapy    Risk parameters:  Patient reports no suicidal ideation  Patient reports no homicidal ideation  Patient reports no self-injurious behavior  Patient reports no violent behavior    Verbal deficits: None    Patient's response to intervention:  The patient's response to intervention is accepting.    Progress toward goals and other mental status changes:  The patient's progress toward goals is  good.    Diagnosis:     ICD-10-CM ICD-9-CM   1. Generalized anxiety disorder  F41.1 300.02       Plan:  individual psychotherapy    Return to clinic: 1 week    Length of Service (minutes): 60

## 2024-03-27 NOTE — PROGRESS NOTES
Patient ID: Sofi Vidal is a 38 y.o. female.    Chief Complaint: Back Pain and Urinary Tract Infection      Assessment:       1. Bilateral back pain, unspecified back location, unspecified chronicity    2. Major depressive disorder, single episode, mild    3. Acute cystitis without hematuria          Plan:         1. Bilateral back pain, unspecified back location, unspecified chronicity  -     Urinalysis, Reflex to Urine Culture Urine, Clean Catch    2. Major depressive disorder, single episode, mild    3. Acute cystitis without hematuria  -     POCT URINE DIPSTICK WITHOUT MICROSCOPE    Other orders  -     naproxen (NAPROSYN) 500 MG tablet; Take 1 tablet (500 mg total) by mouth 2 (two) times daily with meals.  Dispense: 30 tablet; Refill: 1  -     methocarbamoL (ROBAXIN) 500 MG Tab; Take 1 tablet (500 mg total) by mouth 3 (three) times daily. for 10 days  Dispense: 40 tablet; Refill: 1  -     Urinalysis Microscopic       Natural history and time line of expected issues with back pain, conservative treatment discussed, heat, ice, NSAIDs, muscle relaxants.  Avoid prolonged sitting.  Avoid heavy lifting or straining.  Walking is good, gentle stretches and range of motion.  Consider dedicated physical therapy  Formal urinalysis; POCT did not reveal bacteria, symptoms are not suggestive of UTI currently.  Renal stones in the past but this does not remind her of her kidney stone pain  In terms of mood, will review with her psychologist what recommended medication they discussed and will consider treatment   Subjective:   [facility] appt    Back pain for a few days- thinks it started after doing some yard work (did some heavy work this weekend).    Sx started mainly in the low back and since then may notice when she lifts her legs.  Driving makes it worse.  Tried ibuprofen and heat, helped a little.    No urinary sx.  Slight loose stools.  No fever or chills.    She is a  and sits a lot- never has had low  back issue but did have upper back pain last year- and PT helped.    Sleeps on her back.        Back Pain  This is a new problem. The current episode started in the past 7 days. The problem occurs constantly. The problem has been waxing and waning since onset. The pain is present in the sacro-iliac. The quality of the pain is described as aching, burning and shooting. The pain does not radiate. The pain is at a severity of 7/10. The pain is moderate. The pain is Worse during the day. The symptoms are aggravated by coughing, position and twisting. Stiffness is present At night. Pertinent negatives include no abdominal pain, bladder incontinence, bowel incontinence, chest pain, dysuria, fever, headaches, leg pain, numbness, paresis, paresthesias, pelvic pain, perianal numbness, tingling, weakness or weight loss. The treatment provided mild relief.     Patient Active Problem List   Diagnosis    Cholelithiasis: seen on u/s 2014    Anxiety and depression    Acne    Iron deficiency    Vitamin D insufficiency    Mixed hyperlipidemia    Renal stone: R non obstructing 2/22    B12 deficiency    Generalized anxiety disorder    Poor concentration    Major depressive disorder, single episode, mild        Review of Systems   Constitutional:  Negative for fever and weight loss.   Cardiovascular:  Negative for chest pain.   Gastrointestinal:  Negative for abdominal pain and bowel incontinence.   Genitourinary:  Negative for bladder incontinence, dysuria and pelvic pain.   Musculoskeletal:  Positive for back pain.   Neurological:  Negative for tingling, weakness, numbness, headaches and paresthesias.   Psychiatric/Behavioral:          Situational stress, no SI or HI.  Some depression and mild anxiety, concentration difficulty.  Has been working with psychologists         Objective:      Physical Exam  Constitutional:       Appearance: She is well-developed.   HENT:      Head: Normocephalic and atraumatic.   Cardiovascular:      Rate  and Rhythm: Normal rate and regular rhythm.      Heart sounds: No murmur heard.  Pulmonary:      Effort: Pulmonary effort is normal. No respiratory distress.      Breath sounds: Normal breath sounds. No wheezing.   Abdominal:      General: There is no distension.      Palpations: Abdomen is soft.   Musculoskeletal:      Cervical back: Normal range of motion and neck supple.      Comments: No CVA pain.  Negative SLR.  Strength UE and LE wnl.  No pain over spine   Skin:     General: Skin is warm and dry.   Neurological:      Mental Status: She is oriented to person, place, and time.      Cranial Nerves: No cranial nerve deficit.      Deep Tendon Reflexes: Reflexes normal.   Psychiatric:         Mood and Affect: Mood normal.         Behavior: Behavior normal.         Thought Content: Thought content normal.         Judgment: Judgment normal.             Health Maintenance Due   Topic Date Due    Mammogram  05/08/2024          Answers submitted by the patient for this visit:  Back Pain Questionnaire (Submitted on 3/27/2024)  Chief Complaint: Back pain  genital pain: No

## 2024-03-27 NOTE — TELEPHONE ENCOUNTER
----- Message from Sandy Canela, PhD sent at 3/27/2024  1:15 PM CDT -----  Hello! I'm not a prescribing provider, but I talked to my colleagues in the grey (NP and PA) and they typically use Wellbutrin. Strattera is a non-stimulant medication for ADHD and inattention, but it may not be covered without an ADHD diagnosis.      Thanks!    Sandy      ----- Message -----  From: Angelica Marin MD  Sent: 3/27/2024  11:33 AM CDT  To: Sandy Canela, PhD    Hi- I know you sent me a message- which kind of non-stimulant medication would you recommend?    LB

## 2024-03-28 PROBLEM — F32.0 MAJOR DEPRESSIVE DISORDER, SINGLE EPISODE, MILD: Status: ACTIVE | Noted: 2024-03-28

## 2024-04-01 ENCOUNTER — OFFICE VISIT (OUTPATIENT)
Dept: PSYCHIATRY | Facility: CLINIC | Age: 39
End: 2024-04-01
Payer: COMMERCIAL

## 2024-04-01 DIAGNOSIS — F41.1 GENERALIZED ANXIETY DISORDER: Primary | ICD-10-CM

## 2024-04-01 PROCEDURE — 90837 PSYTX W PT 60 MINUTES: CPT | Mod: 95,,, | Performed by: PSYCHOLOGIST

## 2024-04-05 NOTE — PROGRESS NOTES
Virtual Visit  The patient location is: Home ([place])  The chief complaint leading to consultation is: Anxiety, Trauma/Stress    Visit type: audiovisual    Face to Face time with patient: 53 minutes  60 minutes of total time spent on the encounter, which includes face to face time and non-face to face time preparing to see the patient (eg, review of tests), Obtaining and/or reviewing separately obtained history, Documenting clinical information in the electronic or other health record, Independently interpreting results (not separately reported) and communicating results to the patient/family/caregiver, or Care coordination (not separately reported).     Each patient to whom he or she provides medical services by telemedicine is:  (1) informed of the relationship between the physician and patient and the respective role of any other health care provider with respect to management of the patient; and (2) notified that he or she may decline to receive medical services by telemedicine and may withdraw from such care at any time.    Notes: N/A      [facility]  Individual Psychotherapy (PhD/LCSW)    4/8/2024    Site:  [facility]         Therapeutic Intervention: Met with patient.  Outpatient - Insight oriented psychotherapy 60 min - CPT code 47119 and Outpatient - Behavior modifying psychotherapy 60 min - CPT code 45953    Chief complaint/reason for encounter: anxiety and trauma/stressor-related symptoms     Interval history and content of current session: Ms. Sofi Vidal arrived on time for her scheduled appointment.    [facility], Session 2 (Intermediate Sessions)  Session Focus:  Brief check-in  Set agenda  Collect rating scales       CHELSEA-7 Score: (P) 5  Interpretation: (P) Mild Anxiety        PHQ8 Score : (P) 2  PHQ8 Interpretation: (P) Minimal or None  Review action plan  Review Treatment Plan and Values/Aspirations as needed  Intervention techniques: Trauma Account, Impact Statement  Summarize  session  Feedback about session    Action Plan:  Review therapy materials  Intervention practice: Read Trauma Account 1x/week; Read Impact Statement 2x/week and add details as relevant      04/01/2024   Overall Treatment Plan: To live a life with a greater sense of control, direction, and peace  Values and Aspirations: Being helpful to others (of service), Being a really good Mom, Promoting independence and autonomy   Problem List / Patient's Goals and Evidence-Based Interventions (include up to 5):   Problem #1: Lack of focus and concentration, Distractibility  Goal: To have better organization and flow in my business and home life  Therapy Interventions: ___  Non-Therapy Strategies: ___   Problem #2: Constantly being in survival mode, Restlessness, anxiety, feeling on edge  Goal: To feel at peace, To have more fun and enjoyment, To be more carefree  Therapy Interventions: Trauma/Stressors Account, Impact Statement  Non-Therapy Strategies: ___   Problem #3: Belief - I am not capable  Goal: To know and trust myself that I am capable of accomplishing what I put my mind to  Therapy Interventions: ___  Non-Therapy Strategies: ___   Problem #4:  Goal: ___  Therapy Interventions: ___  Non-Therapy Strategies: ___   Problem #5:  Goal: ___  Therapy Interventions: ___  Non-Therapy Strategies: ___       Treatment plan:  Target symptoms: anxiety , trauma/stress  Why chosen therapy is appropriate versus another modality: relevant to diagnosis, evidence based practice  Outcome monitoring methods: self-report, checklist/rating scale  Therapeutic intervention type: insight oriented psychotherapy, behavior modifying psychotherapy    Risk parameters:  Patient reports no suicidal ideation  Patient reports no homicidal ideation  Patient reports no self-injurious behavior  Patient reports no violent behavior    Verbal deficits: None    Patient's response to intervention:  The patient's response to intervention is  accepting.    Progress toward goals and other mental status changes:  The patient's progress toward goals is good.    Diagnosis:     ICD-10-CM ICD-9-CM   1. Trauma and stressor-related disorder  F43.9 309.81     308.9   2. Generalized anxiety disorder  F41.1 300.02       Plan:  individual psychotherapy    Return to clinic: 1 week    Length of Service (minutes): 60

## 2024-04-08 ENCOUNTER — OFFICE VISIT (OUTPATIENT)
Dept: PSYCHIATRY | Facility: CLINIC | Age: 39
End: 2024-04-08
Payer: COMMERCIAL

## 2024-04-08 ENCOUNTER — PATIENT MESSAGE (OUTPATIENT)
Dept: PSYCHIATRY | Facility: CLINIC | Age: 39
End: 2024-04-08

## 2024-04-08 DIAGNOSIS — F41.1 GENERALIZED ANXIETY DISORDER: ICD-10-CM

## 2024-04-08 DIAGNOSIS — F43.9 TRAUMA AND STRESSOR-RELATED DISORDER: Primary | ICD-10-CM

## 2024-04-08 PROCEDURE — 90837 PSYTX W PT 60 MINUTES: CPT | Mod: 95,,, | Performed by: PSYCHOLOGIST

## 2024-04-17 RX ORDER — NAPROXEN 500 MG/1
500 TABLET ORAL 2 TIMES DAILY WITH MEALS
Qty: 30 TABLET | Refills: 1 | Status: SHIPPED | OUTPATIENT
Start: 2024-04-17

## 2024-04-17 NOTE — TELEPHONE ENCOUNTER
Care Due:                  Date            Visit Type   Department     Provider  --------------------------------------------------------------------------------                                MYCHART                              FOLLOWUP/OF  [facility] INTERNAL  Last Visit: 03-      FICE VISIT   MEDICINE       Angelica Marin                              ESTABLISHED                              PATIENT -    [facility] INTERNAL  Next Visit: 04-      VIRTUAL      MEDICINE       Angelica Marin                                                            Last  Test          Frequency    Reason                     Performed    Due Date  --------------------------------------------------------------------------------    Vitamin D...  12 months..  cholecalciferol,.........  10-   10-    Health [facility] Embedded Care Due Messages. Reference number: [id number].   4/17/2024 8:07:36 AM CDT

## 2024-04-22 ENCOUNTER — PATIENT MESSAGE (OUTPATIENT)
Dept: PSYCHIATRY | Facility: CLINIC | Age: 39
End: 2024-04-22

## 2024-04-22 ENCOUNTER — OFFICE VISIT (OUTPATIENT)
Dept: PSYCHIATRY | Facility: CLINIC | Age: 39
End: 2024-04-22
Payer: COMMERCIAL

## 2024-04-22 DIAGNOSIS — F41.1 GENERALIZED ANXIETY DISORDER: ICD-10-CM

## 2024-04-22 DIAGNOSIS — F43.9 TRAUMA AND STRESSOR-RELATED DISORDER: Primary | ICD-10-CM

## 2024-04-22 PROCEDURE — 90837 PSYTX W PT 60 MINUTES: CPT | Mod: 95,,, | Performed by: PSYCHOLOGIST

## 2024-04-22 NOTE — PROGRESS NOTES
Virtual Visit  The patient location is: [place] ([place])  The chief complaint leading to consultation is: Anxiety, Trauma/Stress    Visit type: audiovisual    Face to Face time with patient: 53 minutes  60 minutes of total time spent on the encounter, which includes face to face time and non-face to face time preparing to see the patient (eg, review of tests), Obtaining and/or reviewing separately obtained history, Documenting clinical information in the electronic or other health record, Independently interpreting results (not separately reported) and communicating results to the patient/family/caregiver, or Care coordination (not separately reported).     Each patient to whom he or she provides medical services by telemedicine is:  (1) informed of the relationship between the physician and patient and the respective role of any other health care provider with respect to management of the patient; and (2) notified that he or she may decline to receive medical services by telemedicine and may withdraw from such care at any time.    Notes: N/A      [facility]  Individual Psychotherapy (PhD/LCSW)    4/22/2024    Site:  [facility]         Therapeutic Intervention: Met with patient.  Outpatient - Insight oriented psychotherapy 60 min - CPT code 42468 and Outpatient - Behavior modifying psychotherapy 60 min - CPT code 84542    Chief complaint/reason for encounter: anxiety and trauma/stressor-related symptoms     Interval history and content of current session: Ms. Sofi Vidal arrived on time for her scheduled appointment.    [facility], Session 3 (Intermediate Sessions)  Session Focus:  Brief check-in  Set agenda  Collect rating scales                         Review action plan  Review Treatment Plan and Values/Aspirations as needed  Intervention techniques: Stuck Point Log  Summarize session  Feedback about session    Action Plan:  Review therapy materials  Intervention practice: Read Trauma Account 1x/week; Add  Stuck Points to stuck point log      04/01/2024   Overall Treatment Plan: To live a life with a greater sense of control, direction, and peace  Values and Aspirations: Being helpful to others (of service), Being a really good Mom, Promoting independence and autonomy   Problem List / Patient's Goals and Evidence-Based Interventions (include up to 5):   Problem #1: Lack of focus and concentration, Distractibility  Goal: To have better organization and flow in my business and home life  Therapy Interventions: ___  Non-Therapy Strategies: ___   Problem #2: Constantly being in survival mode, Restlessness, anxiety, feeling on edge  Goal: To feel at peace, To have more fun and enjoyment, To be more carefree  Therapy Interventions: Trauma/Stressors Account, Impact Statement  Non-Therapy Strategies: ___   Problem #3: Belief - I am not capable  Goal: To know and trust myself that I am capable of accomplishing what I put my mind to  Therapy Interventions: ___  Non-Therapy Strategies: ___   Problem #4:  Goal: ___  Therapy Interventions: ___  Non-Therapy Strategies: ___   Problem #5:  Goal: ___  Therapy Interventions: ___  Non-Therapy Strategies: ___       Treatment plan:  Target symptoms: anxiety , trauma/stress  Why chosen therapy is appropriate versus another modality: relevant to diagnosis, evidence based practice  Outcome monitoring methods: self-report, checklist/rating scale  Therapeutic intervention type: insight oriented psychotherapy, behavior modifying psychotherapy    Risk parameters:  Patient reports no suicidal ideation  Patient reports no homicidal ideation  Patient reports no self-injurious behavior  Patient reports no violent behavior    Verbal deficits: None    Patient's response to intervention:  The patient's response to intervention is accepting.    Progress toward goals and other mental status changes:  The patient's progress toward goals is good.    Diagnosis:     ICD-10-CM ICD-9-CM   1. Trauma and  stressor-related disorder  F43.9 309.81     308.9   2. Generalized anxiety disorder  F41.1 300.02       Plan:  individual psychotherapy    Return to clinic: 1 week    Length of Service (minutes): 60

## 2024-04-29 ENCOUNTER — OFFICE VISIT (OUTPATIENT)
Dept: PSYCHIATRY | Facility: CLINIC | Age: 39
End: 2024-04-29
Payer: COMMERCIAL

## 2024-04-29 DIAGNOSIS — F41.1 GENERALIZED ANXIETY DISORDER: ICD-10-CM

## 2024-04-29 DIAGNOSIS — F43.9 TRAUMA AND STRESSOR-RELATED DISORDER: Primary | ICD-10-CM

## 2024-04-29 PROCEDURE — 90837 PSYTX W PT 60 MINUTES: CPT | Mod: 95,,, | Performed by: PSYCHOLOGIST

## 2024-04-29 NOTE — PROGRESS NOTES
Virtual Visit  The [place] location is: [place] ([place])  The chief complaint leading to consultation is: Anxiety, Trauma/Stress    Visit type: audiovisual    Face to Face time with patient: 53 minutes  60 minutes of total time spent on the encounter, which includes face to face time and non-face to face time preparing to see the patient (eg, review of tests), Obtaining and/or reviewing separately obtained history, Documenting clinical information in the electronic or other health record, Independently interpreting results (not separately reported) and communicating results to the patient/family/caregiver, or Care coordination (not separately reported).     Each patient to whom he or she provides medical services by telemedicine is:  (1) informed of the relationship between the physician and patient and the respective role of any other health care provider with respect to management of the patient; and (2) notified that he or she may decline to receive medical services by telemedicine and may withdraw from such care at any time.    Notes: N/A      [facility]  Individual Psychotherapy (PhD/LCSW)    4/29/2024    Site:  [facility]         Therapeutic Intervention: Met with patient.  Outpatient - Insight oriented psychotherapy 60 min - CPT code 66495 and Outpatient - Behavior modifying psychotherapy 60 min - CPT code 92164    Chief complaint/reason for encounter: anxiety and trauma/stressor-related symptoms     Interval history and content of current session: Ms. Sofi Vidal arrived on time for her scheduled appointment.    [facility], Session 4 (Intermediate Sessions)  Session Focus:  Brief check-in  Set agenda  Collect rating scales       CHELSEA-7 Score: (P) 5  Interpretation: (P) Mild Anxiety        PHQ8 Score : (P) 2  PHQ8 Interpretation: (P) Minimal or None  Review action plan  Review Treatment Plan and Values/Aspirations as needed  Intervention techniques: Stuck Point Log, ABC Worksheets  Summarize  session  Feedback about session    Action Plan:  Review therapy materials  Intervention practice: ABC Worksheets, Nice/worthwhile things, Compliments      04/01/2024   Overall Treatment Plan: To live a life with a greater sense of control, direction, and peace  Values and Aspirations: Being helpful to others (of service), Being a really good Mom, Promoting independence and autonomy   Problem List / Patient's Goals and Evidence-Based Interventions (include up to 5):   Problem #1: Lack of focus and concentration, Distractibility  Goal: To have better organization and flow in my business and home life  Therapy Interventions: Cognitive Restructuring, Self-care  Non-Therapy Strategies: ___   Problem #2: Constantly being in survival mode, Restlessness, anxiety, feeling on edge  Goal: To feel at peace, To have more fun and enjoyment, To be more carefree  Therapy Interventions: Trauma/Stressors Account, Impact Statement, ABC Worksheets  Non-Therapy Strategies: ___   Problem #3: Belief - I am not capable  Goal: To know and trust myself that I am capable of accomplishing what I put my mind to  Therapy Interventions: ABC Worksheets, Cognitive Restructuring  Non-Therapy Strategies: ___   Problem #4:  Goal: ___  Therapy Interventions: ___  Non-Therapy Strategies: ___   Problem #5:  Goal: ___  Therapy Interventions: ___  Non-Therapy Strategies: ___       Treatment plan:  Target symptoms: anxiety , trauma/stress  Why chosen therapy is appropriate versus another modality: relevant to diagnosis, evidence based practice  Outcome monitoring methods: self-report, checklist/rating scale  Therapeutic intervention type: insight oriented psychotherapy, behavior modifying psychotherapy    Risk parameters:  Patient reports no suicidal ideation  Patient reports no homicidal ideation  Patient reports no self-injurious behavior  Patient reports no violent behavior    Verbal deficits: None    Patient's response to intervention:  The  patient's response to intervention is accepting.    Progress toward goals and other mental status changes:  The patient's progress toward goals is good.    Diagnosis:     ICD-10-CM ICD-9-CM   1. Trauma and stressor-related disorder  F43.9 309.81     308.9   2. Generalized anxiety disorder  F41.1 300.02       Plan:  individual psychotherapy    Return to clinic: 1 week    Length of Service (minutes): 60

## 2024-04-30 ENCOUNTER — OFFICE VISIT (OUTPATIENT)
Dept: INTERNAL MEDICINE | Facility: CLINIC | Age: 39
End: 2024-04-30
Payer: COMMERCIAL

## 2024-04-30 DIAGNOSIS — F32.0 MAJOR DEPRESSIVE DISORDER, SINGLE EPISODE, MILD: Primary | ICD-10-CM

## 2024-04-30 PROCEDURE — 1159F MED LIST DOCD IN RCRD: CPT | Mod: CPTII,95,, | Performed by: INTERNAL MEDICINE

## 2024-04-30 PROCEDURE — 99213 OFFICE O/P EST LOW 20 MIN: CPT | Mod: 95,,, | Performed by: INTERNAL MEDICINE

## 2024-04-30 PROCEDURE — 1160F RVW MEDS BY RX/DR IN RCRD: CPT | Mod: CPTII,95,, | Performed by: INTERNAL MEDICINE

## 2024-04-30 RX ORDER — ESCITALOPRAM OXALATE 10 MG/1
TABLET ORAL
Start: 2024-04-30

## 2024-04-30 NOTE — PROGRESS NOTES
Telemedicine Video Visit    The patient location is:  Patient Home   The chief complaint leading to consultation is: follow up mood  Visit type: Virtual visit with synchronous audio and video  Total time spent with patient: 15 minutes  Each patient to whom he or she provides medical services by telemedicine is:  (1) informed of the relationship between the physician and patient and the respective role of any other health care provider with respect to management of the patient; and (2) notified that he or she may decline to receive medical services by telemedicine and may withdraw from such care at any time.     Buproprion has helped with energy and focus    Slight increase in anxiety, although not severe.  In general, sleep is good.  Exercising regularly.  Also getting counseling which has helped a great deal.    Patient Active Problem List   Diagnosis    Cholelithiasis: seen on u/s 2014    Anxiety and depression    Acne    Iron deficiency    Vitamin D insufficiency    Mixed hyperlipidemia    Renal stone: R non obstructing 2/22    B12 deficiency    Generalized anxiety disorder    Poor concentration    Major depressive disorder, single episode, mild    Trauma and stressor-related disorder      Review of Systems   HENT:  Negative for hearing loss.    Eyes:  Negative for discharge.   Respiratory:  Negative for wheezing.    Cardiovascular:  Negative for chest pain and palpitations.   Gastrointestinal:  Negative for blood in stool, constipation, diarrhea and vomiting.   Genitourinary:  Negative for dysuria and hematuria.   Musculoskeletal:  Negative for neck pain.   Neurological:  Negative for weakness and headaches.   Endo/Heme/Allergies:  Negative for polydipsia.   Psychiatric/Behavioral:          See HPI      Physical Exam  Constitutional:       Appearance: She is well-developed.   HENT:      Head: Normocephalic and atraumatic.   Eyes:      Extraocular Movements: Extraocular movements intact.      Conjunctiva/sclera:  Conjunctivae normal.   Neck:      Thyroid: No thyromegaly.   Pulmonary:      Effort: No respiratory distress.   Neurological:      General: No focal deficit present.      Mental Status: She is alert and oriented to person, place, and time.   Psychiatric:         Mood and Affect: Mood normal.         Behavior: Behavior normal.         Thought Content: Thought content normal.         Judgment: Judgment normal.        1. Major depressive disorder, single episode, mild    Other orders  -     EScitalopram oxalate (LEXAPRO) 10 MG tablet; 1/2- 1 daily       Continue with bupropion, cautions and side effects reviewed.  She may reduce to once a day or 1-1/2 pills a day  May add Lexapro sparingly, cautions and side effects reviewed  Continue with exercise, sleep hygiene, healthy habits  One-month follow-up, return sooner with problems in the interim    Answers submitted by the patient for this visit:  Review of Systems Questionnaire (Submitted on 4/30/2024)  activity change: No  unexpected weight change: No  rhinorrhea: No  trouble swallowing: No  visual disturbance: No  chest tightness: No  polyuria: No  difficulty urinating: No  menstrual problem: No  joint swelling: No  arthralgias: No  confusion: No  dysphoric mood: No

## 2024-05-06 ENCOUNTER — OFFICE VISIT (OUTPATIENT)
Dept: PSYCHIATRY | Facility: CLINIC | Age: 39
End: 2024-05-06
Payer: COMMERCIAL

## 2024-05-06 DIAGNOSIS — F41.1 GENERALIZED ANXIETY DISORDER: ICD-10-CM

## 2024-05-06 DIAGNOSIS — F43.9 TRAUMA AND STRESSOR-RELATED DISORDER: Primary | ICD-10-CM

## 2024-05-06 PROCEDURE — 90837 PSYTX W PT 60 MINUTES: CPT | Mod: 95,,, | Performed by: PSYCHOLOGIST

## 2024-05-06 NOTE — PROGRESS NOTES
Virtual Visit  The patient location is: Home ([place])  The chief complaint leading to consultation is: Anxiety, Trauma/Stress    Visit type: audiovisual    Face to Face time with patient: 53 minutes  60 minutes of total time spent on the encounter, which includes face to face time and non-face to face time preparing to see the patient (eg, review of tests), Obtaining and/or reviewing separately obtained history, Documenting clinical information in the electronic or other health record, Independently interpreting results (not separately reported) and communicating results to the patient/family/caregiver, or Care coordination (not separately reported).     Each patient to whom he or she provides medical services by telemedicine is:  (1) informed of the relationship between the physician and patient and the respective role of any other health care provider with respect to management of the patient; and (2) notified that he or she may decline to receive medical services by telemedicine and may withdraw from such care at any time.    Notes: N/A      [facility]  Individual Psychotherapy (PhD/LCSW)    5/6/2024    Site:  [facility]         Therapeutic Intervention: Met with patient.  Outpatient - Insight oriented psychotherapy 60 min - CPT code 94071 and Outpatient - Behavior modifying psychotherapy 60 min - CPT code 30182    Chief complaint/reason for encounter: anxiety and trauma/stressor-related symptoms     Interval history and content of current session: Ms. Sofi Vidal arrived on time for her scheduled appointment.    [facility], Session 5 (Intermediate Sessions)  Session Focus:  Brief check-in  Set agenda  Collect rating scales       CHELSEA-7 Score: (P) 3  Interpretation: (P) Normal        PHQ8 Score : (P) 6  PHQ8 Interpretation: (P) Mild  Review action plan  Review Treatment Plan and Values/Aspirations as needed  Intervention techniques: Review ABC Worksheets, Challenging Questions  Summarize  session  Feedback about session    Action Plan:  Review therapy materials  Intervention practice: ABC Worksheets PRN, Challenging Questions, Nice/worthwhile things, Compliments      04/01/2024   Overall Treatment Plan: To live a life with a greater sense of control, direction, and peace  Values and Aspirations: Being helpful to others (of service), Being a really good Mom, Promoting independence and autonomy   Problem List / Patient's Goals and Evidence-Based Interventions (include up to 5):   Problem #1: Lack of focus and concentration, Distractibility  Goal: To have better organization and flow in my business and home life  Therapy Interventions: Cognitive Restructuring, Self-care  Non-Therapy Strategies: ___   Problem #2: Constantly being in survival mode, Restlessness, anxiety, feeling on edge  Goal: To feel at peace, To have more fun and enjoyment, To be more carefree  Therapy Interventions: Trauma/Stressors Account, Impact Statement, ABC Worksheets  Non-Therapy Strategies: ___   Problem #3: Belief - I am not capable  Goal: To know and trust myself that I am capable of accomplishing what I put my mind to  Therapy Interventions: ABC Worksheets, Cognitive Restructuring  Non-Therapy Strategies: ___   Problem #4:  Goal: ___  Therapy Interventions: ___  Non-Therapy Strategies: ___   Problem #5:  Goal: ___  Therapy Interventions: ___  Non-Therapy Strategies: ___       Treatment plan:  Target symptoms: anxiety , trauma/stress  Why chosen therapy is appropriate versus another modality: relevant to diagnosis, evidence based practice  Outcome monitoring methods: self-report, checklist/rating scale  Therapeutic intervention type: insight oriented psychotherapy, behavior modifying psychotherapy    Risk parameters:  Patient reports no suicidal ideation  Patient reports no homicidal ideation  Patient reports no self-injurious behavior  Patient reports no violent behavior    Verbal deficits: None    Patient's response to  intervention:  The patient's response to intervention is accepting.    Progress toward goals and other mental status changes:  The patient's progress toward goals is good.    Diagnosis:     ICD-10-CM ICD-9-CM   1. Trauma and stressor-related disorder  F43.9 309.81     308.9   2. Generalized anxiety disorder  F41.1 300.02       Plan:  individual psychotherapy    Return to clinic: 1 week    Length of Service (minutes): 60

## 2024-05-10 ENCOUNTER — HOSPITAL ENCOUNTER (OUTPATIENT)
Dept: RADIOLOGY | Facility: HOSPITAL | Age: 39
Discharge: HOME | End: 2024-05-10
Attending: INTERNAL MEDICINE
Payer: COMMERCIAL

## 2024-05-10 VITALS — BODY MASS INDEX: 24.2 KG/M2 | WEIGHT: 141 LBS

## 2024-05-10 DIAGNOSIS — Z12.31 ENCOUNTER FOR SCREENING MAMMOGRAM FOR BREAST CANCER: ICD-10-CM

## 2024-05-10 PROBLEM — F41.9 ANXIETY AND DEPRESSION: Status: RESOLVED | Noted: 2019-10-01 | Resolved: 2024-05-10

## 2024-05-10 PROBLEM — F32.A ANXIETY AND DEPRESSION: Status: RESOLVED | Noted: 2019-10-01 | Resolved: 2024-05-10

## 2024-05-10 PROCEDURE — 77063 BREAST TOMOSYNTHESIS BI: CPT | Mod: TC

## 2024-05-10 PROCEDURE — 77067 SCR MAMMO BI INCL CAD: CPT | Mod: 26,,, | Performed by: RADIOLOGY

## 2024-05-10 PROCEDURE — 77063 BREAST TOMOSYNTHESIS BI: CPT | Mod: 26,,, | Performed by: RADIOLOGY

## 2024-05-10 NOTE — PROGRESS NOTES
"Virtual Visit  The patient location is: [place] ([place])  The chief complaint leading to consultation is: Anxiety, Trauma/Stress    Visit type: audiovisual    Face to Face time with patient: 53 minutes  60 minutes of total time spent on the encounter, which includes face to face time and non-face to face time preparing to see the patient (eg, review of tests), Obtaining and/or reviewing separately obtained history, Documenting clinical information in the electronic or other health record, Independently interpreting results (not separately reported) and communicating results to the patient/family/caregiver, or Care coordination (not separately reported).     Each patient to whom he or she provides medical services by telemedicine is:  (1) informed of the relationship between the physician and patient and the respective role of any other health care provider with respect to management of the patient; and (2) notified that he or she may decline to receive medical services by telemedicine and may withdraw from such care at any time.    Notes: N/A      [facility]  Individual Psychotherapy (PhD/LCSW)    5/13/2024    Site:  [facility]         Therapeutic Intervention: Met with patient.  Outpatient - Insight oriented psychotherapy 60 min - CPT code 62786 and Outpatient - Behavior modifying psychotherapy 60 min - CPT code 16519    Chief complaint/reason for encounter: anxiety and trauma/stressor-related symptoms     Interval history and content of current session: Ms. Sofi Vidal arrived on time for her scheduled appointment. She has been engaging in nice/worthwhile things and receiving compliments. She is actively noticing stuck points and redirecting her thinking. She is "already starting to feel more at peace."    [facility], Session 6 (Intermediate Sessions)  Session Focus:  Brief check-in  Set agenda  Collect rating scales       CHELSEA-7 Score: (P) 0  Interpretation: (P) Normal        PHQ8 Score : (P) 0  PHQ8 " Interpretation: (P) Minimal or None  Review action plan  Review Treatment Plan and Values/Aspirations as needed  Intervention techniques: Review Challenging Questions, Unhelpful Thinking Patterns, [place] Ahead  Summarize session  Feedback about session    Action Plan:  Review therapy materials  Intervention practice: ABC Worksheets PRN, Challenging Questions PRN, Nice/worthwhile things, Compliments, [place]      04/01/2024   Overall Treatment Plan: To live a life with a greater sense of control, direction, and peace  Values and Aspirations: Being helpful to others (of service), Being a really good Mom, Promoting independence and autonomy   Problem List / Patient's Goals and Evidence-Based Interventions (include up to 5):   Problem #1: Lack of focus and concentration, Distractibility  Goal: To have better organization and flow in my business and home life  Therapy Interventions: Cognitive Restructuring, Self-care  Non-Therapy Strategies: Being intentional   Problem #2: Constantly being in survival mode, Restlessness, anxiety, feeling on edge  Goal: To feel at peace, To have more fun and enjoyment, To be more carefree  Therapy Interventions: Trauma/Stressors Account, Impact Statement, ABC Worksheets  Non-Therapy Strategies: Spending time with friends and parents, Getting nails done,    Problem #3: Belief - I am not capable  Goal: To know and trust myself that I am capable of accomplishing what I put my mind to  Therapy Interventions: ABC Worksheets, Cognitive Restructuring, [place] ahead  Non-Therapy Strategies: Journaling       Treatment plan:  Target symptoms: anxiety , trauma/stress  Why chosen therapy is appropriate versus another modality: relevant to diagnosis, evidence based practice  Outcome monitoring methods: self-report, checklist/rating scale  Therapeutic intervention type: insight oriented psychotherapy, behavior modifying psychotherapy    Risk parameters:  Patient reports no suicidal ideation  Patient  reports no homicidal ideation  Patient reports no self-injurious behavior  Patient reports no violent behavior    Verbal deficits: None    Patient's response to intervention:  The patient's response to intervention is accepting.    Progress toward goals and other mental status changes:  The patient's progress toward goals is good.    Diagnosis:     ICD-10-CM ICD-9-CM   1. Trauma and stressor-related disorder  F43.9 309.81     308.9   2. Generalized anxiety disorder  F41.1 300.02       Plan:  individual psychotherapy    Return to clinic: 1 week    Length of Service (minutes): 60

## 2024-05-13 ENCOUNTER — OFFICE VISIT (OUTPATIENT)
Dept: PSYCHIATRY | Facility: CLINIC | Age: 39
End: 2024-05-13
Payer: COMMERCIAL

## 2024-05-13 ENCOUNTER — PATIENT MESSAGE (OUTPATIENT)
Dept: PSYCHIATRY | Facility: CLINIC | Age: 39
End: 2024-05-13

## 2024-05-13 DIAGNOSIS — F43.9 TRAUMA AND STRESSOR-RELATED DISORDER: Primary | ICD-10-CM

## 2024-05-13 DIAGNOSIS — F41.1 GENERALIZED ANXIETY DISORDER: ICD-10-CM

## 2024-05-13 PROBLEM — Z91.89 AT HIGH RISK FOR BREAST CANCER: Status: ACTIVE | Noted: 2024-05-13

## 2024-05-13 PROCEDURE — 90837 PSYTX W PT 60 MINUTES: CPT | Mod: 95,,, | Performed by: PSYCHOLOGIST

## 2024-05-15 ENCOUNTER — PATIENT MESSAGE (OUTPATIENT)
Dept: INTERNAL MEDICINE | Facility: CLINIC | Age: 39
End: 2024-05-15
Payer: COMMERCIAL

## 2024-05-15 DIAGNOSIS — Z91.89 AT HIGH RISK FOR BREAST CANCER: Primary | ICD-10-CM

## 2024-05-17 NOTE — PROGRESS NOTES
Virtual Visit  The patient location is: Home ([place])  The chief complaint leading to consultation is: Anxiety, Trauma/Stress    Visit type: audiovisual    Face to Face time with patient: 45 minutes  50 minutes of total time spent on the encounter, which includes face to face time and non-face to face time preparing to see the patient (eg, review of tests), Obtaining and/or reviewing separately obtained history, Documenting clinical information in the electronic or other health record, Independently interpreting results (not separately reported) and communicating results to the patient/family/caregiver, or Care coordination (not separately reported).     Each patient to whom he or she provides medical services by telemedicine is:  (1) informed of the relationship between the physician and patient and the respective role of any other health care provider with respect to management of the patient; and (2) notified that he or she may decline to receive medical services by telemedicine and may withdraw from such care at any time.    Notes: N/A      [facility]  Individual Psychotherapy (PhD/LCSW)    5/21/2024    Site:  [facility]         Therapeutic Intervention: Met with patient.  Outpatient - Insight oriented psychotherapy 45 min - CPT code 75170 and Outpatient - Behavior modifying psychotherapy 45 min - CPT code 18737    Chief complaint/reason for encounter: anxiety and trauma/stressor-related symptoms     Interval history and content of current session: Ms. Sofi Vidal arrived on time for her scheduled appointment.     [facility], Session 7 (Intermediate Sessions)  Session Focus:  Brief check-in  Set agenda  Collect rating scales       CHELSEA-7 Score: (P) 2  Interpretation: (P) Normal        PHQ8 Score : (P) 2  PHQ8 Interpretation: (P) Minimal or None  Review action plan  Review Treatment Plan and Values/Aspirations as needed  Intervention techniques: Review Challenging Questions, Unhelpful Thinking Patterns,  [place] Ahead  Summarize session  Feedback about session    Action Plan:  Review therapy materials  Intervention practice: ABC Worksheets PRN, Challenging Questions PRN, Nice/worthwhile things, Compliments, [place] A[place]      04/01/2024   Overall Treatment Plan: To live a life with a greater sense of control, direction, and peace  Values and Aspirations: Being helpful to others (of service), Being a really good Mom, Promoting independence and autonomy   Problem List / Patient's Goals and Evidence-Based Interventions (include up to 5):   Problem #1: Lack of focus and concentration, Distractibility  Goal: To have better organization and flow in my business and home life  Therapy Interventions: Cognitive Restructuring, Self-care  Non-Therapy Strategies: Being intentional   Problem #2: Constantly being in survival mode, Restlessness, anxiety, feeling on edge  Goal: To feel at peace, To have more fun and enjoyment, To be more carefree  Therapy Interventions: Trauma/Stressors Account, Impact Statement, ABC Worksheets  Non-Therapy Strategies: Spending time with friends and parents, Getting nails done,    Problem #3: Belief - I am not capable  Goal: To know and trust myself that I am capable of accomplishing what I put my mind to  Therapy Interventions: ABC Worksheets, Cognitive Restructuring, [place] ahead  Non-Therapy Strategies: Journaling       Treatment plan:  Target symptoms: anxiety , trauma/stress  Why chosen therapy is appropriate versus another modality: relevant to diagnosis, evidence based practice  Outcome monitoring methods: self-report, checklist/rating scale  Therapeutic intervention type: insight oriented psychotherapy, behavior modifying psychotherapy    Risk parameters:  Patient reports no suicidal ideation  Patient reports no homicidal ideation  Patient reports no self-injurious behavior  Patient reports no violent behavior    Verbal deficits: None    Patient's response to intervention:  The patient's  response to intervention is accepting.    Progress toward goals and other mental status changes:  The patient's progress toward goals is good.    Diagnosis:     ICD-10-CM ICD-9-CM   1. Trauma and stressor-related disorder  F43.9 309.81     308.9   2. Generalized anxiety disorder  F41.1 300.02       Plan:  individual psychotherapy    Return to clinic: 1 week    Length of Service (minutes): 45

## 2024-05-21 ENCOUNTER — OFFICE VISIT (OUTPATIENT)
Dept: PSYCHIATRY | Facility: CLINIC | Age: 39
End: 2024-05-21
Payer: COMMERCIAL

## 2024-05-21 DIAGNOSIS — F43.9 TRAUMA AND STRESSOR-RELATED DISORDER: Primary | ICD-10-CM

## 2024-05-21 DIAGNOSIS — F41.1 GENERALIZED ANXIETY DISORDER: ICD-10-CM

## 2024-05-21 PROCEDURE — 90834 PSYTX W PT 45 MINUTES: CPT | Mod: 95,,, | Performed by: PSYCHOLOGIST

## 2024-05-24 NOTE — PROGRESS NOTES
Virtual Visit  The patient location is: [place] ([place])  The chief complaint leading to consultation is: Anxiety, Trauma/Stress    Visit type: audiovisual    Face to Face time with patient: 45 minutes  50 minutes of total time spent on the encounter, which includes face to face time and non-face to face time preparing to see the patient (eg, review of tests), Obtaining and/or reviewing separately obtained history, Documenting clinical information in the electronic or other health record, Independently interpreting results (not separately reported) and communicating results to the patient/family/caregiver, or Care coordination (not separately reported).     Each patient to whom he or she provides medical services by telemedicine is:  (1) informed of the relationship between the physician and patient and the respective role of any other health care provider with respect to management of the patient; and (2) notified that he or she may decline to receive medical services by telemedicine and may withdraw from such care at any time.    Notes: N/A      [facility]  Individual Psychotherapy (PhD/LCSW)    5/27/2024    Site:  [facility]         Therapeutic Intervention: Met with patient.  Outpatient - Insight oriented psychotherapy 45 min - CPT code 54201 and Outpatient - Behavior modifying psychotherapy 45 min - CPT code 91269    Chief complaint/reason for encounter: anxiety and trauma/stressor-related symptoms     Interval history and content of current session: Ms. Sofi Vidal arrived on time for her scheduled appointment.     [facility], Session 8 (Intermediate Sessions)  Session Focus:  Brief check-in  Set agenda  Collect rating scales       CHELSEA-7 Score: (P) 0  Interpretation: (P) Normal        PHQ8 Score : (P) 0  PHQ8 Interpretation: (P) Minimal or None  Review action plan  Review Treatment Plan and Values/Aspirations as needed  Intervention techniques: Review Challenging Questions, Unhelpful Thinking Patterns,  [place] Ahead  Summarize session  Feedback about session    Action Plan:  Review therapy materials  Intervention practice: ABC Worksheets PRN, Challenging Questions PRN, Nice/worthwhile things, Compliments, [place] A[place]      04/01/2024   Overall Treatment Plan: To live a life with a greater sense of control, direction, and peace  Values and Aspirations: Being helpful to others (of service), Being a really good Mom, Promoting independence and autonomy   Problem List / Patient's Goals and Evidence-Based Interventions (include up to 5):   Problem #1: Lack of focus and concentration, Distractibility  Goal: To have better organization and flow in my business and home life  Therapy Interventions: Cognitive Restructuring, Self-care  Non-Therapy Strategies: Being intentional   Problem #2: Constantly being in survival mode, Restlessness, anxiety, feeling on edge  Goal: To feel at peace, To have more fun and enjoyment, To be more carefree  Therapy Interventions: Trauma/Stressors Account, Impact Statement, ABC Worksheets  Non-Therapy Strategies: Spending time with friends and parents, Getting nails done,    Problem #3: Belief - I am not capable  Goal: To know and trust myself that I am capable of accomplishing what I put my mind to  Therapy Interventions: ABC Worksheets, Cognitive Restructuring, [place] ahead  Non-Therapy Strategies: Journaling       Treatment plan:  Target symptoms: anxiety , trauma/stress  Why chosen therapy is appropriate versus another modality: relevant to diagnosis, evidence based practice  Outcome monitoring methods: self-report, checklist/rating scale  Therapeutic intervention type: insight oriented psychotherapy, behavior modifying psychotherapy    Risk parameters:  Patient reports no suicidal ideation  Patient reports no homicidal ideation  Patient reports no self-injurious behavior  Patient reports no violent behavior    Verbal deficits: None    Patient's response to intervention:  The patient's  response to intervention is accepting.    Progress toward goals and other mental status changes:  The patient's progress toward goals is good.    Diagnosis:     ICD-10-CM ICD-9-CM   1. Trauma and stressor-related disorder  F43.9 309.81     308.9   2. Generalized anxiety disorder  F41.1 300.02       Plan:  individual psychotherapy    Return to clinic: 1 week    Length of Service (minutes): 45

## 2024-05-27 ENCOUNTER — OFFICE VISIT (OUTPATIENT)
Dept: PSYCHIATRY | Facility: CLINIC | Age: 39
End: 2024-05-27
Payer: COMMERCIAL

## 2024-05-27 ENCOUNTER — PATIENT MESSAGE (OUTPATIENT)
Dept: PSYCHIATRY | Facility: CLINIC | Age: 39
End: 2024-05-27

## 2024-05-27 DIAGNOSIS — F41.1 GENERALIZED ANXIETY DISORDER: ICD-10-CM

## 2024-05-27 DIAGNOSIS — F43.9 TRAUMA AND STRESSOR-RELATED DISORDER: Primary | ICD-10-CM

## 2024-05-27 PROCEDURE — 90834 PSYTX W PT 45 MINUTES: CPT | Mod: 95,,, | Performed by: PSYCHOLOGIST

## 2024-06-10 ENCOUNTER — PATIENT MESSAGE (OUTPATIENT)
Dept: INTERNAL MEDICINE | Facility: CLINIC | Age: 39
End: 2024-06-10
Payer: COMMERCIAL

## 2024-07-03 ENCOUNTER — PATIENT MESSAGE (OUTPATIENT)
Dept: OBSTETRICS AND GYNECOLOGY | Facility: CLINIC | Age: 39
End: 2024-07-03
Payer: COMMERCIAL

## 2024-07-08 RX ORDER — ESCITALOPRAM OXALATE 10 MG/1
TABLET ORAL
Start: 2024-07-08 | End: 2024-07-10 | Stop reason: SDUPTHER

## 2024-07-08 NOTE — TELEPHONE ENCOUNTER
Care Due:                  Date            Visit Type   Department     Provider  --------------------------------------------------------------------------------                                MYCHART                              FOLLOWUP/OF  [facility] INTERNAL  Last Visit: 03-      [facility] VISIT   MEDICINE       Angelica Marin  Next Visit: None Scheduled  None         None Found                                                            Last  Test          Frequency    Reason                     Performed    Due Date  --------------------------------------------------------------------------------    Vitamin D...  12 months..  cholecalciferol,.........  10-   10-    [facility] Embedded Care Due Messages. Reference number: [id number].   7/08/2024 11:12:23 AM CDT

## 2024-07-16 ENCOUNTER — OFFICE VISIT (OUTPATIENT)
Dept: HEMATOLOGY/ONCOLOGY | Facility: CLINIC | Age: 39
End: 2024-07-16
Payer: COMMERCIAL

## 2024-07-16 ENCOUNTER — PATIENT MESSAGE (OUTPATIENT)
Dept: HEMATOLOGY/ONCOLOGY | Facility: CLINIC | Age: 39
End: 2024-07-16

## 2024-07-16 VITALS
RESPIRATION RATE: 20 BRPM | WEIGHT: 146.38 LBS | BODY MASS INDEX: 24.99 KG/M2 | SYSTOLIC BLOOD PRESSURE: 98 MMHG | OXYGEN SATURATION: 100 % | DIASTOLIC BLOOD PRESSURE: 58 MMHG | HEART RATE: 89 BPM | HEIGHT: 64 IN

## 2024-07-16 DIAGNOSIS — Z80.3 FAMILY HISTORY OF BREAST CANCER: ICD-10-CM

## 2024-07-16 DIAGNOSIS — R92.343 EXTREMELY DENSE TISSUE OF BOTH BREASTS ON MAMMOGRAPHY: Primary | ICD-10-CM

## 2024-07-16 DIAGNOSIS — Z12.31 ENCOUNTER FOR SCREENING MAMMOGRAM FOR MALIGNANT NEOPLASM OF BREAST: ICD-10-CM

## 2024-07-16 DIAGNOSIS — Z91.89 AT HIGH RISK FOR BREAST CANCER: ICD-10-CM

## 2024-07-16 PROCEDURE — 3074F SYST BP LT 130 MM HG: CPT | Mod: CPTII,S$GLB,, | Performed by: NURSE PRACTITIONER

## 2024-07-16 PROCEDURE — 3008F BODY MASS INDEX DOCD: CPT | Mod: CPTII,S$GLB,, | Performed by: NURSE PRACTITIONER

## 2024-07-16 PROCEDURE — 3078F DIAST BP <80 MM HG: CPT | Mod: CPTII,S$GLB,, | Performed by: NURSE PRACTITIONER

## 2024-07-16 PROCEDURE — G2211 COMPLEX E/M VISIT ADD ON: HCPCS | Mod: S$GLB,,, | Performed by: NURSE PRACTITIONER

## 2024-07-16 PROCEDURE — 1159F MED LIST DOCD IN RCRD: CPT | Mod: CPTII,S$GLB,, | Performed by: NURSE PRACTITIONER

## 2024-07-16 PROCEDURE — 99999 PR PBB SHADOW E&M-EST. PATIENT-LVL IV: CPT | Mod: PBBFAC,,, | Performed by: NURSE PRACTITIONER

## 2024-07-16 PROCEDURE — 99204 OFFICE O/P NEW MOD 45 MIN: CPT | Mod: S$GLB,,, | Performed by: NURSE PRACTITIONER

## 2024-07-16 NOTE — PROGRESS NOTES
"  Reason For Consultation:   High-Risk Breast Cancer      Referring Provider:   Angelica Marin MD  [address] [place] [address]    Records Obtained: Records of the patients history including those obtained from the referring provider were reviewed and summarized in detail.    HPI:   Sofi Vidal is a 38 y.o. who presents for consultation of increased risk of breast cancer.      Today, Feels good and no complaints.   No breast concerns.    High Risk Breast cancer specific history:  - Age: 38 y.o.   - Height/Weight:  Estimated body surface area is 1.73 meters squared as calculated from the following:    Height as of this encounter: 5' 4" (1.626 m).    Weight as of this encounter: 66.4 kg (146 lb 6.2 oz).  - Body mass index is 25.13 kg/m².  - Breast density per BI-RADS:  extremely dense  - Age at menarche:   9  - Number of pregnancies: ; age of first live birth: 29  - History of breast feedin year  -Uterus and ovaries intact: Yes  - Menopausal status: premenopausal.  - HRT: No  - Genetic testing:  No  - Personal history of cancer: No  - Previous chest radiation exposure between ages 10-30 years old: No  - Personal history of breast biopsy:  No  - Ashken[employer] Inheritance:  No Other   - Family history of cancer:    Cancer-related family history includes Breast cancer (age of onset: 68) in her maternal grandmother; Cancer in her father and maternal grandmother; Lung cancer in her maternal grandmother. There is no history of Ovarian cancer.    Maternal grandmother: breast cancer at 68,  at 72 from copd,  no genetics, left breast only.  Also a primary lung cancer  Maternal great great aunt: breast cancer    Social History   Social History     Tobacco Use    Smoking status: Never    Smokeless tobacco: Never   Substance Use Topics    Alcohol use: Yes     Comment: rare    Drug use: No     Exercise regimen: not as much  Patient's occupation: Data Unavailable.   Networked " reference to record EEP 1000[[employer] , about to go back to school    SEE CALCULATED RISK BELOW.     Past Medical   Past Medical History:   Diagnosis Date    Abnormal cervical Papanicolaou smear 2011, 11-19-12    Colposcopy , HONEY 1    Anxiety     Anxiety and depression 10/01/2019    Childhood asthma     Cholelithiasis complicating pregnancy, antepartum      Patient Active Problem List   Diagnosis    Cholelithiasis: seen on u/s 2014    Acne    Iron deficiency    Vitamin D insufficiency    Mixed hyperlipidemia    Renal stone: R non obstructing 2/22    B12 deficiency    Generalized anxiety disorder    Poor concentration    Major depressive disorder, single episode, mild    Trauma and stressor-related disorder    At high risk for breast cancer: 22% 5/24     Family History  Family History   Problem Relation Name Age of Onset    Anxiety disorder Mother      Cancer Father          prostate    Heart disease Sister          MVP    Allergy (severe) Son Alexsander     Diabetes Maternal Aunt      Breast cancer Maternal Grandmother  68    Lung cancer Maternal Grandmother      Cancer Maternal Grandmother          breast, lung    Parkinsonism Paternal Grandmother      Morena Parkinson White syndrome Paternal Grandmother      Colon cancer Neg Hx      Ovarian cancer Neg Hx       Medications    Current Outpatient Medications:     buPROPion (WELLBUTRIN) 75 MG tablet, Take 1 tablet (75 mg total) by mouth 2 (two) times daily., Disp: 60 tablet, Rfl: 11    cholecalciferol, vitamin D3, (VITAMIN D3) 25 mcg (1,000 unit) capsule, Take 1 capsule (1,000 Units total) by mouth once daily., Disp: 90 capsule, Rfl: 3    cyanocobalamin (VITAMIN B-12) 1000 MCG tablet, Take 1 tablet (1,000 mcg total) by mouth once daily., Disp: 30 tablet, Rfl: 12    EScitalopram oxalate (LEXAPRO) 10 MG tablet, Take 1 tablet (10 mg total) by mouth once daily. 1/2- 1 daily, Disp: 90 tablet, Rfl: 3    naproxen (NAPROSYN) 500 MG tablet, TAKE 1 TABLET(500 MG)  "BY MOUTH TWICE DAILY WITH MEALS, Disp: 30 tablet, Rfl: 1    norgestimate-ethinyl estradioL (TRI-SPRINTEC, 28,) 0.18/0.215/0.25 mg-35 mcg (28) tablet, Take 1 tablet by mouth once daily., Disp: 84 tablet, Rfl: 4    LORazepam (ATIVAN) 0.5 MG tablet, Take 1 tablet (0.5 mg total) by mouth daily as needed for Anxiety., Disp: 30 tablet, Rfl: 0  Allergies  Review of patient's allergies indicates:  No Known Allergies    Review of Systems       See above   All other systems reviewed and are negative.    Objective:      Vitals:   Vitals:    07/16/24 1100   BP: (!) 98/58   Pulse: 89   Resp: 20   SpO2: 100%   Weight: 66.4 kg (146 lb 6.2 oz)   Height: 5' 4" (1.626 m)     BMI: Body mass index is 25.13 kg/m².   Body surface area is 1.73 meters squared.    Physical Exam  Vitals signs reviewed.   Constitutional:  Normal appearance. NAD.   HENT: Normocephalic.   Eyes: Pupils are equal, round, and reactive to light.   Cardiovascular: Normal rate.   Pulmonary: Pulmonary effort is normal.   Musculoskeletal: Normal range of motion.   Lymphadenopathy: No cervical adenopathy. No axillary adenopathy.   Skin: Skin is warm and dry.   Neurological:  Alert and oriented to person, place, and time.   Psychiatric: Mood normal.     Breast Exam: Bilateral breast normal. No masses, no tenderness, no skin or nipple abnormalities.  No lymphadenopathy.       Laboratory Data: reviewed most recent   Imaging: reviewed most recent      General Education discussed:      1. General education: (not patient specific)    Risk factors associated with breast cancer are categorized into 2 groups: Modifiable and Non-modifiable. Modifiable risk factors include use of hormones, alcohol, smoking, diet and exercise. Non-modifiable risk factors include breast density, genetics, chest radiation, previous pregnancies, age of first period, and age of menopause.     Factors associated with greater breast cancer risk: (this list is not patient specific)  -Increasing age The " risk of breast cancer increases with older age.  -Female sex  -White race (In the [place], the highest breast cancer risk occurs among White women, although breast cancer remains the most common cancer among women of every major ethnic/racial group )  -Weight and body fat in postmenopausal women -Obesity (defined as body mass index [BMI] ?30 kg/m2) is associated with an overall increase  in morbidity and mortality. However, the risk of breast cancer associated with BMI differs by menopausal status.   ?Postmenopausal women - A higher BMI and/or perimenopausal weight gain have been consistently associated with a higher risk of breast cancer among postmenopausal women. The association between a higher BMI and postmenopausal breast cancer risk may be mediated by higher estrogen levels resulting from the peripheral conversion of estrogen precursors (from adipose tissue) to estrogen    ?Inverse relationship in premenopausal women - Unlike postmenopausal women, an increased BMI is associated with a lower risk of breast cancer in premenopausal women, particularly in early adulthood.  The explanation of this finding remains unclear.  -Tall stature -women who were >175 cm (69 inches) tall were 20 percent more likely to develop breast cancer than those <160 cm (63 inches) tall.  -Benign breast disease  -Dense breast tissue -- The density of breast tissue reflects the relative amount of glandular and connective tissue (parenchyma) to adipose tissue. Women with mammographically dense breast tissue, generally defined as dense tissue comprising ?75 percent of the breast, have a four to five times higher breast cancer risk compared with women of similar age with less or no dense tissue. Although breast density is a largely inherited trait, other factors can influence density. For example, lower density has been associated with higher levels of physical activity  and with a low-fat, high-carbohydrate diet. In postmenopausal  women, estrogen and progesterone increase breast density  while the ER antagonist tamoxifen decreases breast density.  Despite the association of exogenous hormones with breast density, breast density is not strongly correlated with endogenous hormone levels. Breast tend to become more fatty with age.   -Bone mineral density -In multiple studies, women with higher bone density have a higher breast cancer risk  -Hormonal factors:   With regard to Breast cancer -- combined oral contraceptives (JULIANO)  appear to be associated with little to no increased risk of breast cancer based on observational data. Any effect appears to be temporary and limited to current or recent (within five to seven years) JULIANO use. I will put a link here for  review:  Combined estrogen-progestin contraception: Side effects and health concerns - UpToDate     HRT.:  Of note, IVF does not appear to increase the long-term risk of breast cancer, even in women with BRCA 1 and 2 mutations.     In the Women's Health Initiative (WHI), the risk of invasive breast cancer was significantly increased with combined hormone therapy (HT) at an average follow-up of 5.6 years.     A 2019 meta-analysis of all available epidemiologic evidence on the association between menopausal hormone therapy (MHT) use and breast cancer risk has been published. The analysis included nearly 145,000 women with breast cancer (51 percent of whom had used MHT) and nearly 425,000 without breast cancer. Their findings included:  ?Similar to the WHI, estrogen-progestin regimens were associated with excess breast cancer risk. An excess risk was also seen with estrogen-only regimens (a reduction in risk was seen in the WHI). There was no excess risk with vaginal estrogens.   ?Breast cancer risk increased with the duration of systemic MHT use. Unlike previous studies, obesity was not associated with excess risk; instead, it attenuated risk.  ?The authors of the study calculated that for  women of average weight, five years of MHT use starting at age 50 years would increase their 20-year risk of breast cancer (between the ages of 50 and 69 years) by approximately:  One in every 50 users of estrogen plus daily progestin  One in every 70 users of estrogen plus intermittent progestin   One in every 200 users of estrogen-only regimens   Of note: There were important limitations in this study.   While this meta-analysis has renewed concerns for some about the association between MHT and breast cancer, we continue to suggest an individualized approach when counseling symptomatic postmenopausal women about treatment. This includes putting the potential risk of breast cancer (and cardiovascular disease) in the context of the benefits of MHT (eg, relief of vasomotor symptoms, improved sleep and quality of life, and prevention of bone loss)  -Reproductive factors -Earlier menarche (before age 12) or later menopause (after age 52), Nulliparity, Increasing age at first full-term pregnancy.   (Of note, It is estimated that for every 12 months of breastfeeding, there was a 4.3 percent reduction in the relative risk (RR) of breast cancer)  -Personal and family history of breast cancer  -Alcohol use and smoking  -Exposure to therapeutic ionizing radiation       2. Risk stratifying models:  There are several models available for stratifying breast cancer risk, and Jorge-Matias is presently the model utilized by [facility] and is a model recommended per current NCCN guidelines.      Educational videos:   What Is a TC Score?    https://youtu.be/Mtwm5JCSqxA     What If I Have a High-Risk TC Score?     https://youtu.be/sXy3aDf0c8P      The Adelaide Model for Breast Cancer risk estimates the absolute 5 year risk and lifetime risk of developing breast cancer. Family history includes only first degree relatives with breast cancer, which is not enough information to estimate the risk of a patient having BRCA  mutation. It also underestimates the cancer risk for patients with extensive family history. The Adelaide Model is a good predictor of risk for populations but not for individuals. It adjusts risk for race/ethnicity. It may underestimate breast cancer risk in patients with atypical hyperplasia and strong family history. The Adelaide Model was NOT designed to estimate risk for: Women with a prior diagnosis of breast cancer, lobular carcinoma in situ (LCIS), or ductal carcinoma in situ (DCIS);  Women who have received previous radiation therapy to the chest for treatment of Hodgkin lymphoma;  Women with gene mutations in BRCA1 or BRCA2, or those who are known to have certain genetic syndromes that increase risk for breast cancer; Women of age <35 or >85.    We discussed that there are limitations to every model for risk assessment, particularly that TC can overestimate risk in women with atypical hyperplasia and dense breasts and that Adelaide underestimates risk for those with a strong family history of breast or ovarian cancers as well as non-white women with atypical hyperplasia which can make them appear to not be candidates for risk reducing therapies.               3. High risk patients:       INCREASED RISK SCREENING: per NCCN                      MRI breast:       The use of MRI for breast cancer detection is based on the concept of  tumor angiogenesis or neovascularity. Tumor-associated blood vessels have increased permeability, which leads to prompt uptake and release of gadolinium within the first one to two minutes after administration, leading to a pattern of rapid enhancement and washout on MRI.   Bilateral breast examination - Both breasts should be evaluated in an MRI study, for comparison purposes, even when concern about possible pathology involves only one breast.    Contrast - Intravenous gadolinium contrast must be used to maximize cancer detection and is administered before breast MRI to highlight the  neovascularity associated with cancers. Contrast is not necessary when the study is performed to evaluate silicone implant integrity.  Allergic and anaphylactoid reactions to gadolinium are rare, but can occur. In addition, in patients with renal failure, gadolinium can cause contrast nephropathy and/or nephrogenic systemic fibrosis.   A few studies have also reported gadolinium deposition in the brain from repeated intravenous administration, with the degree of deposition varying based on the specific contrast agent. The clinical significance of this deposition remains unknown, and no data for humans exist to show any adverse effects or harm at this time.   https://www.fda.gov/drugs/drug-safety-and-availability/fda-drug-safety-communication-fda-identifies-no-harmful-effects-date-brain-retention-gadolinium  https://www.fda.gov/Drugs/DrugSafety/rny760415.htm    -Contact insurance company with regards to coverage of MRI breasts.   -Cannot undergo an MRI if pregnant.   -MRI's may have false positives                 RAUL (contrast enhanced mammogram):  RAUL is the main alternative for anyone who benefits by but cannot have a breast MRI with IV contrast.    Main indications:   High risk screening   Suspicious clinical symptoms with inconclusive mammogram and ultrasound   New breast cancer, evaluate extent of disease   Pre and post nacho-adjuvant systemic therapy evaluation     For high-risk screening, RAUL replaces the regular non-contrast mammogram and any other supplemental test       -RECOMMENDED LIFESTYLE MODIFICATIONS FOR HIGH RISK PATIENTS:    * Reviewed Lifestyle modifications which have shown benefit:  Limit alcohol consumption to less than 1 drink per day (1 ounce liquor, 6 oz wine, 8 oz beer) and no more than 3 drinks per week.   Avoid smoking.  Exercise at least 150 minutes per week of moderate intensity aerobic activity or at least 75 minutes of vigorous activity. Exercise can lower the relative risk of breast  cancer by ~18-20%.  Maintain healthy weight and avoid post-menopausal weight gain. Avoid processed foods and eat more lean proteins, fruits and vegetables.                               * Available resources include genetic counseling, nutrition, weight management.       -CHEMOPREVENTION:              * For women at high risk for breast cancer, endocrine therapy can reduce the risk of invasive and/or in situ breast cancers. (tamoxifen for premenopausal or postmenopausal women and raloxifene or exemestane for postmenopausal women).   -Tamoxifen 20 mg daily for 5 years has shown to reduce risk of breast cancer by 49% and women with ADH/ALH or LCIS have an even more significant reduction of risk of 86%. Aromatase inhibitors for 5 years have also shown risk reduction in terms of 50-60%. At current, there is not adequate data to recommend longer courses of therapy more than 5 years for risk reduction. A more recent study shows that Tamoxifen 5 mg for three years shows comparable risk reduction.    -Above have been shown to lower the risk of breast cancer incidence, however there is no survival benefit in patients who don't have breast cancer.   -Tamoxifen has limited data in BRCA 1/2 mutation carriers but limited retrospective data is suggestive of benefit. There is retrospective data that aromatase inhibitors can reduce the risk of contralateral ER positive breast cancers in BRCA 1/2 patients who were taking AIs as adjuvant therapy. There is no data for raloxifen in the population.    -Risks of Tamoxifen side effects include hot flashes, invasive endometrial cancer in women > 49 years of age (2.3/1000 compared to 0.9/1000), cataracts, increased risk of pulmonary embolism among others.      Assessment:     1. At high risk for breast cancer: 22% 5/24          Breast Cancer Risk Stratification   Current, Estimated Breast Cancer Risk Model Used Patient's Score Risk for general population Patient's Risk Category   5-year  Adelaide Model 0.8% 0.6%  [] N/A given age <35   [x] Average risk (<1.7%)   [] Increased risk (?1.7%)   10-year Pater-Katalinack v8.0b 3.74%   [x] <5%   [] ?5%    Lifetime (to age 85) Pater-Marizazick v8.0b 23.18%   [] Average risk (<15%)   [] Intermediate risk (?15% - <20%)   [x] High risk (?20%)   According to the American Cancer Society, patients with a lifetime breast cancer risk of 20% or higher might benefit from supplemental screening exams. Women with a 5-year risk greater than or equal to 1.7% may benefit from chemoprevention agents (tamoxifen, raloxifene, aromatase inhibitors) to reduce risk.      Plan:       Chemoprevention not indicated  Patient elects to proceed with alternating annual mammogram and annual breast MRI along with semiannual CBEs. She will alternate CBE here and with GYN/PCP. She will get her screening MRI in November 2024 and follow up in clinic in January 2025  Encouraged breast awareness, including monthly breast self-exams.   Recommend lifestyle modifications as above.     RTC in 6 months to see me either at [facility] or on 3rd floor with a MRI breast a couple of days prior.     Questions were encouraged and answered to patient's satisfaction, and patient verbalized understanding of information and agreement with the plan. Advised patient to RTC with any interval changes or concerns.        Med Onc Chart Routing      Follow up with physician    Follow up with ANNETTE 6 months. in high risk breast clinic   Infusion scheduling note    Injection scheduling note    Labs    Imaging Mammogram and MRI   MRI in november 2024,  Mammogram in May 2025   Pharmacy appointment    Other referrals                Total time of this visit, including time spent face to face with patient and/or via video/audio, and also in preparing for today's visit for MDM and documentation. (Medical Decision Making, including consideration of possible diagnoses, management options, complex medical record review, review of diagnostic  tests and information, consideration and discussion of significant complications based on comorbidities, and discussion with providers involved with the care of the patient) is 45 minutes. Greater than 50% was spent face to face with the patient counseling and coordinating care.    Nata Fitch NP        Recommendation Preamble: The following recommendations were made during the visit: Detail Level: Zone Render Risk Assessment In Note?: no Recommendations (Free Text): CO2 laser for upper and lower eyelids ($1000) or full face ($2500).

## 2024-07-25 ENCOUNTER — PATIENT MESSAGE (OUTPATIENT)
Dept: INTERNAL MEDICINE | Facility: CLINIC | Age: 39
End: 2024-07-25
Payer: COMMERCIAL

## 2024-07-26 ENCOUNTER — OFFICE VISIT (OUTPATIENT)
Dept: INTERNAL MEDICINE | Facility: CLINIC | Age: 39
End: 2024-07-26
Payer: COMMERCIAL

## 2024-07-26 DIAGNOSIS — A09 TRAVELER'S DIARRHEA: Primary | ICD-10-CM

## 2024-07-26 RX ORDER — AZITHROMYCIN 500 MG/1
500 TABLET, FILM COATED ORAL DAILY
Qty: 3 TABLET | Refills: 0 | Status: SHIPPED | OUTPATIENT
Start: 2024-07-26

## 2024-07-26 NOTE — PROGRESS NOTES
INTERNAL MEDICINE URGENT CARE NOTE    CHIEF COMPLAINT     Chief Complaint   Patient presents with    Diarrhea       HPI     Sofi Vidal is a 39 y.o. female who presents for an urgent visit today.    The patient location is: home, [place]   The chief complaint leading to consultation is: diarrhea     Visit type: audiovisual    Face to Face time with patient: 15 minutes of total time spent on the encounter, which includes face to face time and non-face to face time preparing to see the patient (eg, review of tests), Obtaining and/or reviewing separately obtained history, Documenting clinical information in the electronic or other health record, Independently interpreting results (not separately reported) and communicating results to the patient/family/caregiver, or Care coordination (not separately reported).         Each patient to whom he or she provides medical services by telemedicine is:  (1) informed of the relationship between the physician and patient and the respective role of any other health care provider with respect to management of the patient; and (2) notified that he or she may decline to receive medical services by telemedicine and may withdraw from such care at any time.    Notes:      She is an est pt of Dr Marin - last seen 4/30/2024    Here with c/o 3 week h/o diarrhea - watery stools. 1 liquid bowel mvmt per day using imodium or kaopectate   Started 3 days into covid which she tested positive for after returning from [place] (4 days after)   No abd pain   No nausea or vomiting   No fever or chills   No blood in stool   No weight loss without trying         Past Medical History:  Past Medical History:   Diagnosis Date    Abnormal cervical Papanicolaou smear 2011, 11-19-12    Colposcopy , HONEY 1    Anxiety     Anxiety and depression 10/01/2019    Childhood asthma     Cholelithiasis complicating pregnancy, antepartum        Home Medications:  Prior to Admission medications    Medication Sig Start Date  End Date Taking? Authorizing Provider   buPROPion (WELLBUTRIN) 75 MG tablet Take 1 tablet (75 mg total) by mouth 2 (two) times daily. 3/27/24 3/27/25  Angelica Marin MD   cholecalciferol, vitamin D3, (VITAMIN D3) 25 mcg (1,000 unit) capsule Take 1 capsule (1,000 Units total) by mouth once daily. 10/24/23   Angelica Marin MD   cyanocobalamin (VITAMIN B-12) 1000 MCG tablet Take 1 tablet (1,000 mcg total) by mouth once daily. 10/24/23   Angelica Marin MD   EScitalopram oxalate (LEXAPRO) 10 MG tablet Take 1 tablet (10 mg total) by mouth once daily. 1/2- 1 daily 7/10/24   Angelica Marin MD   LORazepam (ATIVAN) 0.5 MG tablet Take 1 tablet (0.5 mg total) by mouth daily as needed for Anxiety. 2/17/22 3/19/22  Angelica Marin MD   naproxen (NAPROSYN) 500 MG tablet TAKE 1 TABLET(500 MG) BY MOUTH TWICE DAILY WITH MEALS 4/17/24   Angelica Marin MD   norgestimate-ethinyl estradioL (TRI-SPRINTEC, 28,) 0.18/0.215/0.25 mg-35 mcg (28) tablet Take 1 tablet by mouth once daily. 5/4/23   Veronica Mccormack MD       Review of Systems:  Review of Systems   Constitutional:  Positive for activity change. Negative for unexpected weight change.   HENT:  Negative for hearing loss, rhinorrhea and trouble swallowing.    Eyes:  Negative for discharge and visual disturbance.   Respiratory:  Negative for chest tightness and wheezing.    Cardiovascular:  Negative for chest pain and palpitations.   Gastrointestinal:  Positive for diarrhea. Negative for blood in stool, constipation and vomiting.   Endocrine: Negative for polydipsia and polyuria.   Genitourinary:  Negative for difficulty urinating, dysuria, hematuria and menstrual problem.   Musculoskeletal:  Negative for arthralgias, joint swelling and neck pain.   Neurological:  Positive for weakness. Negative for headaches.   Psychiatric/Behavioral:  Negative for confusion and dysphoric mood.        Health Maintainence:   Immunizations:  Health Maintenance         Date Due  Completion Date    COVID-19 Vaccine (3 - 2023-24 season) 10/24/2024 (Originally 9/1/2023) 2/17/2022    TETANUS VACCINE 11/17/2024 11/17/2014    Hemoglobin A1c (Diabetic Prevention Screening) 02/17/2025 2/17/2022    Mammogram 05/10/2025 5/10/2024    Cervical Cancer Screening 05/04/2026 5/4/2023             PHYSICAL EXAM     There were no vitals taken for this visit.    Physical Exam  Pulmonary:      Effort: No respiratory distress.   Neurological:      Mental Status: She is alert and oriented to person, place, and time.         LABS     Lab Results   Component Value Date    HGBA1C 4.9 02/17/2022     CMP  Sodium   Date Value Ref Range Status   10/19/2023 138 136 - 145 mmol/L Final     Potassium   Date Value Ref Range Status   10/19/2023 3.8 3.5 - 5.1 mmol/L Final     Chloride   Date Value Ref Range Status   10/19/2023 102 95 - 110 mmol/L Final     CO2   Date Value Ref Range Status   10/19/2023 21 (L) 23 - 29 mmol/L Final     Glucose   Date Value Ref Range Status   10/19/2023 63 (L) 70 - 110 mg/dL Final     BUN   Date Value Ref Range Status   10/19/2023 12 6 - 20 mg/dL Final     Creatinine   Date Value Ref Range Status   10/19/2023 0.7 0.5 - 1.4 mg/dL Final     Calcium   Date Value Ref Range Status   10/19/2023 9.2 8.7 - 10.5 mg/dL Final     Total Protein   Date Value Ref Range Status   10/19/2023 7.1 6.0 - 8.4 g/dL Final     Albumin   Date Value Ref Range Status   10/19/2023 3.9 3.5 - 5.2 g/dL Final     Total Bilirubin   Date Value Ref Range Status   10/19/2023 0.8 0.1 - 1.0 mg/dL Final     Comment:     For infants and newborns, interpretation of results should be based  on gestational age, weight and in agreement with clinical  observations.    Premature Infant recommended reference ranges:  Up to 24 hours.............<8.0 mg/dL  Up to 48 hours............<12.0 mg/dL  3-5 days..................<15.0 mg/dL  6-29 days.................<15.0 mg/dL       Alkaline Phosphatase   Date Value Ref Range Status   10/19/2023 61  55 - 135 U/L Final     AST   Date Value Ref Range Status   10/19/2023 21 10 - 40 U/L Final     ALT   Date Value Ref Range Status   10/19/2023 22 10 - 44 U/L Final     Anion Gap   Date Value Ref Range Status   10/19/2023 15 8 - 16 mmol/L Final     eGFR if    Date Value Ref Range Status   02/17/2022 >60.0 >60 mL/min/1.73 m^2 Final     eGFR if non    Date Value Ref Range Status   02/17/2022 >60.0 >60 mL/min/1.73 m^2 Final     Comment:     Calculation used to obtain the estimated glomerular filtration  rate (eGFR) is the CKD-EPI equation.        Lab Results   Component Value Date    WBC 5.67 10/19/2023    HGB 13.4 10/19/2023    HCT 42.7 10/19/2023    MCV 94 10/19/2023     10/19/2023     Lab Results   Component Value Date    CHOL 219 (H) 10/19/2023    CHOL 239 (H) 02/17/2022    CHOL 219 (H) 09/24/2019     Lab Results   Component Value Date    HDL 75 10/19/2023    HDL 75 02/17/2022    HDL 90 (H) 09/24/2019     Lab Results   Component Value Date    LDLCALC 122.2 10/19/2023    LDLCALC 135.4 02/17/2022    LDLCALC 115.4 09/24/2019     Lab Results   Component Value Date    TRIG 109 10/19/2023    TRIG 143 02/17/2022    TRIG 68 09/24/2019     Lab Results   Component Value Date    CHOLHDL 34.2 10/19/2023    CHOLHDL 31.4 02/17/2022    CHOLHDL 41.1 09/24/2019     Lab Results   Component Value Date    TSH 2.115 10/19/2023       ASSESSMENT/PLAN     Sofi Vidal is a 39 y.o. female     Traveler's diarrhea- bland diet, oral rehydration. Will start azithromyacin daily x 3 and notify if s/s are not improved by Tuesday   -     azithromycin (ZITHROMAX) 500 MG tablet; Take 1 tablet (500 mg total) by mouth once daily.  Dispense: 3 tablet; Refill: 0    Follow up with PCP     Patient education provided from [place]. Patient was counseled on when and how to seek emergent care.       Amber HAMMOND, JUAN ALBERTO, FNP-c   Department of Internal Medicine - [facility]  7:48 AM

## 2024-08-05 ENCOUNTER — PATIENT MESSAGE (OUTPATIENT)
Dept: INTERNAL MEDICINE | Facility: CLINIC | Age: 39
End: 2024-08-05
Payer: COMMERCIAL

## 2024-08-05 DIAGNOSIS — Z78.9 HEPATITIS B VACCINATION STATUS UNKNOWN: ICD-10-CM

## 2024-08-05 DIAGNOSIS — Z11.1 SCREENING-PULMONARY TB: ICD-10-CM

## 2024-08-05 DIAGNOSIS — Z78.9 UNKNOWN VARICELLA VACCINATION STATUS: Primary | ICD-10-CM

## 2024-08-05 DIAGNOSIS — Z78.9 HISTORY OF MEASLES, MUMPS, RUBELLA (MMR) VACCINATION UNKNOWN: ICD-10-CM

## 2024-08-07 ENCOUNTER — LAB VISIT (OUTPATIENT)
Dept: LAB | Facility: HOSPITAL | Age: 39
End: 2024-08-07

## 2024-08-07 DIAGNOSIS — Z11.1 SCREENING-PULMONARY TB: ICD-10-CM

## 2024-08-07 PROCEDURE — 86480 TB TEST CELL IMMUN MEASURE: CPT | Mod: SP | Performed by: NURSE PRACTITIONER

## 2024-08-08 LAB
GAMMA INTERFERON BACKGROUND BLD IA-ACNC: 0.03 IU/ML
M TB IFN-G CD4+ BCKGRND COR BLD-ACNC: 0 IU/ML
M TB IFN-G CD4+ BCKGRND COR BLD-ACNC: 0.01 IU/ML
MITOGEN IGNF BCKGRD COR BLD-ACNC: 9.97 IU/ML
TB GOLD PLUS: NEGATIVE

## 2024-08-16 ENCOUNTER — OFFICE VISIT (OUTPATIENT)
Dept: OBSTETRICS AND GYNECOLOGY | Facility: CLINIC | Age: 39
End: 2024-08-16
Payer: COMMERCIAL

## 2024-08-16 VITALS
DIASTOLIC BLOOD PRESSURE: 60 MMHG | SYSTOLIC BLOOD PRESSURE: 102 MMHG | WEIGHT: 142 LBS | HEIGHT: 64 IN | BODY MASS INDEX: 24.24 KG/M2

## 2024-08-16 DIAGNOSIS — Z30.9 ENCOUNTER FOR CONTRACEPTIVE MANAGEMENT, UNSPECIFIED TYPE: ICD-10-CM

## 2024-08-16 DIAGNOSIS — Z01.419 ENCOUNTER FOR GYNECOLOGICAL EXAMINATION WITHOUT ABNORMAL FINDING: Primary | ICD-10-CM

## 2024-08-16 DIAGNOSIS — Z31.61 COUNSELING ABOUT NATURAL FAMILY PLANNING: ICD-10-CM

## 2024-08-16 PROCEDURE — 99999 PR PBB SHADOW E&M-EST. PATIENT-LVL III: CPT | Mod: PBBFAC,,, | Performed by: OBSTETRICS & GYNECOLOGY

## 2024-08-16 NOTE — PROGRESS NOTES
"CC: Well woman exam    Sofi Vidal is a 39 y.o. female  presents for a well woman exam.    She is doing well on Wellbutrin and lexapro.    Past Medical History:   Diagnosis Date    Abnormal cervical Papanicolaou smear , 10    Colposcopy , HONEY 1    Anxiety     Anxiety and depression 10/01/2019    Childhood asthma     Cholelithiasis complicating pregnancy, antepartum        Past Surgical History:   Procedure Laterality Date    COLPOSCOPY      LASIK         OB History    Para Term  AB Living   1 1 1 0 0 1   SAB IAB Ectopic Multiple Live Births   0 0 0 0        # Outcome Date GA Lbr Mahin/2nd Weight Sex Type Anes PTL Lv   1 Term                Family History   Problem Relation Name Age of Onset    Parkinsonism Paternal Grandmother      Morena Parkinson White syndrome Paternal Grandmother      Breast cancer Maternal Grandmother  68         at 72, one breast    Lung cancer Maternal Grandmother      Cancer Father          prostate    Anxiety disorder Mother      Heart disease Sister          MVP    Allergy (severe) Son Alexsander     Diabetes Maternal Aunt      Colon cancer Neg Hx      Ovarian cancer Neg Hx      Uterine cancer Neg Hx      Cervical cancer Neg Hx         Social History     Tobacco Use    Smoking status: Never    Smokeless tobacco: Never   Substance Use Topics    Alcohol use: Yes     Comment: rare    Drug use: No       /60   Ht 5' 4" (1.626 m)   Wt 64.4 kg (141 lb 15.6 oz)   LMP 2024 (Exact Date)   BMI 24.37 kg/m²     ROS:  GENERAL: Denies weight gain or weight loss. Feeling well overall.   SKIN: Denies rash or lesions.   HEAD: Denies head injury or headache.   NODES: Denies enlarged lymph nodes.   CHEST: Denies chest pain or shortness of breath.   CARDIOVASCULAR: Denies palpitations or left sided chest pain.   ABDOMEN: No abdominal pain, constipation, diarrhea, nausea, vomiting or rectal bleeding.   URINARY: No frequency, dysuria, hematuria, or burning on " urination.  REPRODUCTIVE: See HPI.   BREASTS: The patient performs breast self-examination and denies pain, lumps, or nipple discharge.   HEMATOLOGIC: No easy bruisability or excessive bleeding.  MUSCULOSKELETAL: Denies joint pain or swelling.   NEUROLOGIC: Denies syncope or weakness.   PSYCHIATRIC: Denies depression, anxiety or mood swings.    Physical Exam:    APPEARANCE: Well nourished, well developed, in no acute distress.  AFFECT: WNL, alert and oriented x 3  SKIN: No acne or hirsutism  NECK: Neck symmetric without masses or thyromegaly  NODES: No inguinal, cervical, axillary, or femoral lymph node enlargement  CHEST: Good respiratory effect  ABDOMEN: Soft.  No tenderness or masses.  No hepatosplenomegaly.  No hernias.  BREASTS: Symmetrical, no skin changes or visible lesions.  No palpable masses, nipple discharge bilaterally.  PELVIC: Normal external genitalia without lesions.  Normal hair distribution.  Adequate perineal body, normal urethral meatus.  Vagina moist and well rugated without lesions or discharge.  Cervix pink, without lesions, discharge or tenderness.  No significant cystocele or rectocele.  Bimanual exam shows uterus to be normal size, regular, mobile and nontender.  Adnexa without masses or tenderness.    EXTREMITIES: No edema.      ASSESSMENT AND PLAN  1. Encounter for gynecological examination without abnormal finding        2. Counseling about natural family planning        3. Encounter for contraceptive management, unspecified type          Natural cycles  Natural womanhood   Consider NFP instructor-  possible [place] instructor    She is deciding if she would like to pursue NFP vs [facility]PS- to notify the office     Patient was counseled today on A.C.S. Pap guidelines and recommendations for yearly pelvic exams, mammograms and monthly self breast exams; to see her PCP for other health maintenance.       Follow up in about 1 year (around 8/16/2025), or if symptoms worsen or fail to  improve.

## 2024-08-19 ENCOUNTER — PATIENT MESSAGE (OUTPATIENT)
Dept: INTERNAL MEDICINE | Facility: CLINIC | Age: 39
End: 2024-08-19
Payer: COMMERCIAL

## 2024-08-21 ENCOUNTER — PATIENT MESSAGE (OUTPATIENT)
Dept: HEMATOLOGY/ONCOLOGY | Facility: CLINIC | Age: 39
End: 2024-08-21
Payer: COMMERCIAL

## 2024-08-21 RX ORDER — HEPATITIS B VACCINE (RECOMBINANT) ADJUVANTED 20 UG/.5ML
INJECTION, SOLUTION INTRAMUSCULAR
Qty: 0.5 ML | Refills: 1 | Status: SHIPPED | OUTPATIENT
Start: 2024-08-21

## 2024-09-25 ENCOUNTER — OFFICE VISIT (OUTPATIENT)
Dept: INTERNAL MEDICINE | Facility: CLINIC | Age: 39
End: 2024-09-25
Payer: COMMERCIAL

## 2024-09-25 ENCOUNTER — LAB VISIT (OUTPATIENT)
Dept: LAB | Facility: HOSPITAL | Age: 39
End: 2024-09-25
Payer: COMMERCIAL

## 2024-09-25 VITALS
DIASTOLIC BLOOD PRESSURE: 65 MMHG | BODY MASS INDEX: 23.9 KG/M2 | WEIGHT: 140 LBS | HEIGHT: 64 IN | SYSTOLIC BLOOD PRESSURE: 100 MMHG

## 2024-09-25 DIAGNOSIS — Z00.00 ANNUAL PHYSICAL EXAM: ICD-10-CM

## 2024-09-25 DIAGNOSIS — Z91.89 AT HIGH RISK FOR BREAST CANCER: ICD-10-CM

## 2024-09-25 DIAGNOSIS — Z00.00 ANNUAL PHYSICAL EXAM: Primary | ICD-10-CM

## 2024-09-25 DIAGNOSIS — E78.2 MIXED HYPERLIPIDEMIA: ICD-10-CM

## 2024-09-25 LAB
25(OH)D3+25(OH)D2 SERPL-MCNC: 45 NG/ML (ref 30–96)
ALBUMIN SERPL BCP-MCNC: 4.2 G/DL (ref 3.5–5.2)
ALP SERPL-CCNC: 57 U/L (ref 55–135)
ALT SERPL W/O P-5'-P-CCNC: 20 U/L (ref 10–44)
ANION GAP SERPL CALC-SCNC: 8 MMOL/L (ref 8–16)
AST SERPL-CCNC: 21 U/L (ref 10–40)
BASOPHILS # BLD AUTO: 0.02 K/UL (ref 0–0.2)
BASOPHILS NFR BLD: 0.4 % (ref 0–1.9)
BILIRUB SERPL-MCNC: 0.4 MG/DL (ref 0.1–1)
BUN SERPL-MCNC: 10 MG/DL (ref 6–20)
CALCIUM SERPL-MCNC: 9.7 MG/DL (ref 8.7–10.5)
CHLORIDE SERPL-SCNC: 106 MMOL/L (ref 95–110)
CHOLEST SERPL-MCNC: 200 MG/DL (ref 120–199)
CHOLEST/HDLC SERPL: 2.9 {RATIO} (ref 2–5)
CO2 SERPL-SCNC: 25 MMOL/L (ref 23–29)
CREAT SERPL-MCNC: 0.7 MG/DL (ref 0.5–1.4)
DIFFERENTIAL METHOD BLD: ABNORMAL
EOSINOPHIL # BLD AUTO: 0.1 K/UL (ref 0–0.5)
EOSINOPHIL NFR BLD: 1 % (ref 0–8)
ERYTHROCYTE [DISTWIDTH] IN BLOOD BY AUTOMATED COUNT: 12.9 % (ref 11.5–14.5)
EST. GFR  (NO RACE VARIABLE): >60 ML/MIN/1.73 M^2
ESTIMATED AVG GLUCOSE: 91 MG/DL (ref 68–131)
FERRITIN SERPL-MCNC: 74 NG/ML (ref 20–300)
GLUCOSE SERPL-MCNC: 71 MG/DL (ref 70–110)
HBA1C MFR BLD: 4.8 % (ref 4–5.6)
HCT VFR BLD AUTO: 43.1 % (ref 37–48.5)
HDLC SERPL-MCNC: 69 MG/DL (ref 40–75)
HDLC SERPL: 34.5 % (ref 20–50)
HGB BLD-MCNC: 13.4 G/DL (ref 12–16)
IMM GRANULOCYTES # BLD AUTO: 0.01 K/UL (ref 0–0.04)
IMM GRANULOCYTES NFR BLD AUTO: 0.2 % (ref 0–0.5)
IRON SERPL-MCNC: 71 UG/DL (ref 30–160)
LDLC SERPL CALC-MCNC: 119.8 MG/DL (ref 63–159)
LYMPHOCYTES # BLD AUTO: 2.2 K/UL (ref 1–4.8)
LYMPHOCYTES NFR BLD: 43.5 % (ref 18–48)
MCH RBC QN AUTO: 29.1 PG (ref 27–31)
MCHC RBC AUTO-ENTMCNC: 31.1 G/DL (ref 32–36)
MCV RBC AUTO: 94 FL (ref 82–98)
MONOCYTES # BLD AUTO: 0.3 K/UL (ref 0.3–1)
MONOCYTES NFR BLD: 4.9 % (ref 4–15)
NEUTROPHILS # BLD AUTO: 2.6 K/UL (ref 1.8–7.7)
NEUTROPHILS NFR BLD: 50 % (ref 38–73)
NONHDLC SERPL-MCNC: 131 MG/DL
NRBC BLD-RTO: 0 /100 WBC
PLATELET # BLD AUTO: 245 K/UL (ref 150–450)
PMV BLD AUTO: 11.6 FL (ref 9.2–12.9)
POTASSIUM SERPL-SCNC: 4 MMOL/L (ref 3.5–5.1)
PROT SERPL-MCNC: 7.2 G/DL (ref 6–8.4)
RBC # BLD AUTO: 4.6 M/UL (ref 4–5.4)
SATURATED IRON: 22 % (ref 20–50)
SODIUM SERPL-SCNC: 139 MMOL/L (ref 136–145)
TOTAL IRON BINDING CAPACITY: 327 UG/DL (ref 250–450)
TRANSFERRIN SERPL-MCNC: 221 MG/DL (ref 200–375)
TRIGL SERPL-MCNC: 56 MG/DL (ref 30–150)
TSH SERPL DL<=0.005 MIU/L-ACNC: 1.49 UIU/ML (ref 0.4–4)
VIT B12 SERPL-MCNC: 373 PG/ML (ref 210–950)
WBC # BLD AUTO: 5.1 K/UL (ref 3.9–12.7)

## 2024-09-25 PROCEDURE — 83540 ASSAY OF IRON: CPT | Performed by: INTERNAL MEDICINE

## 2024-09-25 PROCEDURE — 3078F DIAST BP <80 MM HG: CPT | Mod: CPTII,S$GLB,, | Performed by: INTERNAL MEDICINE

## 2024-09-25 PROCEDURE — 99999 PR PBB SHADOW E&M-EST. PATIENT-LVL III: CPT | Mod: PBBFAC,,, | Performed by: INTERNAL MEDICINE

## 2024-09-25 PROCEDURE — 3074F SYST BP LT 130 MM HG: CPT | Mod: CPTII,S$GLB,, | Performed by: INTERNAL MEDICINE

## 2024-09-25 PROCEDURE — 84443 ASSAY THYROID STIM HORMONE: CPT | Performed by: INTERNAL MEDICINE

## 2024-09-25 PROCEDURE — 1159F MED LIST DOCD IN RCRD: CPT | Mod: CPTII,S$GLB,, | Performed by: INTERNAL MEDICINE

## 2024-09-25 PROCEDURE — 85025 COMPLETE CBC W/AUTO DIFF WBC: CPT | Performed by: INTERNAL MEDICINE

## 2024-09-25 PROCEDURE — 82728 ASSAY OF FERRITIN: CPT | Performed by: INTERNAL MEDICINE

## 2024-09-25 PROCEDURE — 83036 HEMOGLOBIN GLYCOSYLATED A1C: CPT | Performed by: INTERNAL MEDICINE

## 2024-09-25 PROCEDURE — 80053 COMPREHEN METABOLIC PANEL: CPT | Performed by: INTERNAL MEDICINE

## 2024-09-25 PROCEDURE — 82607 VITAMIN B-12: CPT | Performed by: INTERNAL MEDICINE

## 2024-09-25 PROCEDURE — 80061 LIPID PANEL: CPT | Performed by: INTERNAL MEDICINE

## 2024-09-25 PROCEDURE — 3008F BODY MASS INDEX DOCD: CPT | Mod: CPTII,S$GLB,, | Performed by: INTERNAL MEDICINE

## 2024-09-25 PROCEDURE — 99396 PREV VISIT EST AGE 40-64: CPT | Mod: S$GLB,,, | Performed by: INTERNAL MEDICINE

## 2024-09-25 PROCEDURE — 82306 VITAMIN D 25 HYDROXY: CPT | Performed by: INTERNAL MEDICINE

## 2024-09-25 NOTE — PROGRESS NOTES
Patient ID: Sofi Vidal is a 39 y.o. female.    Chief Complaint: Annual Exam      Assessment:       1. Annual physical exam    2. Mixed hyperlipidemia    3. At high risk for breast cancer: 22% 5/24          Plan:           1. Annual physical exam  -     CBC Auto Differential; Future; Expected date: 09/25/2024  -     Comprehensive Metabolic Panel; Future; Expected date: 09/25/2024  -     Lipid Panel; Future; Expected date: 09/25/2024  -     TSH; Future; Expected date: 09/25/2024  -     Hemoglobin A1C; Future; Expected date: 09/25/2024  -     Vitamin B12; Future; Expected date: 09/25/2024  -     Vitamin D; Future; Expected date: 09/25/2024  -     Iron and TIBC; Future; Expected date: 09/25/2024  -     Ferritin; Future; Expected date: 09/25/2024    2. Mixed hyperlipidemia    3. At high risk for breast cancer: 22% 5/24       Continue with exercise, healthy habits, good sleep hygiene  Labs and review  Immunizations discussed, she is deferring currently  High risk for breast cancer, she is alternating MRI with mammogram for now  Keep gyn follow-up at the appropriate interval  Stable on Wellbutrin, taking once daily, she does not feel she needs to increase the dose or add any additional medication.  Cautions reviewed    Subjective:   Annual exam    Doing very well.  Exercising regularly.  In school full-time for counseling.  Sleeping well.  On Wellbutrin once daily.  No complaints.    Not immune to hepatitis-B.  Ambivalent about getting the hepatitis-B vaccine.  Not interested in the COVID booster.    Having regular menses.            Patient Active Problem List   Diagnosis    Cholelithiasis: seen on u/s 2014    Acne    Iron deficiency    Vitamin D insufficiency    Mixed hyperlipidemia    Renal stone: R non obstructing 2/22    B12 deficiency    Generalized anxiety disorder    Poor concentration    Major depressive disorder, single episode, mild    Trauma and stressor-related disorder    At high risk for breast  cancer: 22% 5/24        Review of Systems   Constitutional:  Negative for activity change and unexpected weight change.   HENT:  Negative for hearing loss, rhinorrhea and trouble swallowing.    Eyes:  Negative for discharge and visual disturbance.   Respiratory:  Negative for chest tightness and wheezing.    Cardiovascular:  Negative for chest pain and palpitations.   Gastrointestinal:  Negative for blood in stool, constipation, diarrhea and vomiting.   Genitourinary:  Negative for difficulty urinating and hematuria.   Musculoskeletal:  Negative for neck pain.   Neurological:  Negative for headaches.   Psychiatric/Behavioral:  Negative for dysphoric mood.          Objective:      Physical Exam  Vitals and nursing note reviewed.   Constitutional:       Appearance: She is well-developed.   HENT:      Head: Normocephalic and atraumatic.      Right Ear: External ear normal.      Left Ear: External ear normal.      Nose: Nose normal.      Mouth/Throat:      Pharynx: No oropharyngeal exudate.   Eyes:      General: No scleral icterus.     Extraocular Movements: Extraocular movements intact.      Conjunctiva/sclera: Conjunctivae normal.   Neck:      Thyroid: No thyromegaly.      Vascular: No JVD.   Cardiovascular:      Rate and Rhythm: Normal rate and regular rhythm.      Heart sounds: Normal heart sounds. No murmur heard.     No gallop.   Pulmonary:      Effort: Pulmonary effort is normal. No respiratory distress.      Breath sounds: Normal breath sounds. No wheezing.   Abdominal:      General: Bowel sounds are normal. There is no distension.      Palpations: Abdomen is soft. There is no mass.      Tenderness: There is no abdominal tenderness. There is no guarding or rebound.   Musculoskeletal:         General: No tenderness. Normal range of motion.      Cervical back: Normal range of motion and neck supple.   Lymphadenopathy:      Cervical: No cervical adenopathy.   Skin:     General: Skin is warm.      Findings: No  erythema or rash.   Neurological:      General: No focal deficit present.      Mental Status: She is alert and oriented to person, place, and time.      Cranial Nerves: No cranial nerve deficit.      Coordination: Coordination normal.   Psychiatric:         Behavior: Behavior normal.         Thought Content: Thought content normal.         Judgment: Judgment normal.             There are no preventive care reminders to display for this patient.

## 2024-09-26 ENCOUNTER — PATIENT MESSAGE (OUTPATIENT)
Dept: INTERNAL MEDICINE | Facility: CLINIC | Age: 39
End: 2024-09-26
Payer: COMMERCIAL

## 2024-10-21 ENCOUNTER — PATIENT MESSAGE (OUTPATIENT)
Dept: HEMATOLOGY/ONCOLOGY | Facility: CLINIC | Age: 39
End: 2024-10-21
Payer: COMMERCIAL

## 2024-11-06 ENCOUNTER — TELEPHONE (OUTPATIENT)
Dept: HEMATOLOGY/ONCOLOGY | Facility: CLINIC | Age: 39
End: 2024-11-06
Payer: COMMERCIAL

## 2024-11-17 ENCOUNTER — PATIENT MESSAGE (OUTPATIENT)
Dept: OBSTETRICS AND GYNECOLOGY | Facility: CLINIC | Age: 39
End: 2024-11-17
Payer: COMMERCIAL

## 2024-11-18 RX ORDER — NORGESTIMATE AND ETHINYL ESTRADIOL 7DAYSX3 LO
1 KIT ORAL DAILY
Qty: 30 TABLET | Refills: 11 | Status: SHIPPED | OUTPATIENT
Start: 2024-11-18 | End: 2025-11-18

## 2025-01-10 ENCOUNTER — HOSPITAL ENCOUNTER (OUTPATIENT)
Dept: RADIOLOGY | Facility: HOSPITAL | Age: 40
Discharge: HOME | End: 2025-01-10
Attending: NURSE PRACTITIONER
Payer: COMMERCIAL

## 2025-01-10 DIAGNOSIS — Z91.89 AT HIGH RISK FOR BREAST CANCER: ICD-10-CM

## 2025-01-10 DIAGNOSIS — R92.343 EXTREMELY DENSE TISSUE OF BOTH BREASTS ON MAMMOGRAPHY: ICD-10-CM

## 2025-01-10 DIAGNOSIS — Z80.3 FAMILY HISTORY OF BREAST CANCER: ICD-10-CM

## 2025-01-10 DIAGNOSIS — Z12.39 ENCOUNTER FOR BREAST CANCER SCREENING OTHER THAN MAMMOGRAM: ICD-10-CM

## 2025-01-10 PROCEDURE — A9577 INJ MULTIHANCE: HCPCS | Performed by: NURSE PRACTITIONER

## 2025-01-10 PROCEDURE — 77049 MRI BREAST C-+ W/CAD BI: CPT | Mod: TC

## 2025-01-10 PROCEDURE — 77049 MRI BREAST C-+ W/CAD BI: CPT | Mod: 26,,, | Performed by: RADIOLOGY

## 2025-01-10 PROCEDURE — 25500020 PHARM REV CODE 255: Performed by: NURSE PRACTITIONER

## 2025-01-10 RX ADMIN — GADOBENATE DIMEGLUMINE 14 ML: 529 INJECTION, SOLUTION INTRAVENOUS at 09:01

## 2025-02-11 ENCOUNTER — PATIENT MESSAGE (OUTPATIENT)
Dept: HEMATOLOGY/ONCOLOGY | Facility: CLINIC | Age: 40
End: 2025-02-11
Payer: COMMERCIAL

## 2025-02-12 ENCOUNTER — PATIENT MESSAGE (OUTPATIENT)
Dept: PSYCHIATRY | Facility: CLINIC | Age: 40
End: 2025-02-12
Payer: COMMERCIAL

## 2025-04-14 RX ORDER — BUPROPION HYDROCHLORIDE 75 MG/1
75 TABLET ORAL 2 TIMES DAILY
Qty: 180 TABLET | Refills: 1 | Status: SHIPPED | OUTPATIENT
Start: 2025-04-14

## 2025-04-14 NOTE — TELEPHONE ENCOUNTER
Refill Decision Note   Sofi Vidal  is requesting a refill authorization.  Brief Assessment and Rationale for Refill:  Approve     Medication Therapy Plan:         Comments:     Note composed:7:58 AM 04/14/2025

## 2025-04-14 NOTE — TELEPHONE ENCOUNTER
No care due was identified.  [facility] Embedded Care Due Messages. Reference number: [id number].   4/14/2025 3:26:37 AM CDT

## 2025-05-05 NOTE — PROGRESS NOTES
"  Reason For Consultation:   High-Risk Breast Cancer      Referring Provider:   No referring provider defined for this encounter.    Records Obtained: Records of the patients history including those obtained from the referring provider were reviewed and summarized in detail.    HPI:   Sofi Vidal is a 39 y.o. who presents for a follow up for increased risk of breast cancer.      Today, Feels generally well  No new breast concerns.  No new cancer in her family  MRI in 2025 negative    High Risk Breast cancer specific history:  - Age: 39 y.o.   - Height/Weight:  Estimated body surface area is 1.68 meters squared as calculated from the following:    Height as of this encounter: 5' 4" (1.626 m).    Weight as of this encounter: 62.7 kg (138 lb 3.7 oz).  - Body mass index is 23.73 kg/m².  - Breast density per BI-RADS:  extremely dense  - Age at menarche:   9  - Number of pregnancies: ; age of first live birth: 29  - History of breast feedin 1 year  -Uterus and ovaries intact: Yes  - Menopausal status: premenopausal.  - HRT: No  - Genetic testing:  No  - Personal history of cancer: No  - Previous chest radiation exposure between ages 10-30 years old: No  - Personal history of breast biopsy:  No  - Ash[employer] Den[employer]ational Inheritance:  No Other   - Family history of cancer:    Cancer-related family history includes Breast cancer (age of onset: 68) in her maternal grandmother; Cancer in her father and maternal grandmother; Lung cancer in her maternal grandmother. There is no history of Ovarian cancer, Uterine cancer, or Cervical cancer.    Maternal grandmother: breast cancer at 68,  at 72 from copd,  no genetics, left breast only.  Also a primary lung cancer  Maternal great great aunt: breast cancer    Social History   Social History     Tobacco Use    Smoking status: Never    Smokeless tobacco: Never   Substance Use Topics    Alcohol use: Yes     Comment: rare    Drug use: No     Exercise " regimen: not as much  Patient's occupation: Data Unavailable.   Networked reference to record EEP 1000[[employer] , about to go back to school    SEE CALCULATED RISK BELOW.     Past Medical   Past Medical History:   Diagnosis Date    Abnormal cervical Papanicolaou smear , 10    Colposcopy , HONEY 1    Anxiety     Anxiety and depression 10/01/2019    Childhood asthma     Cholelithiasis complicating pregnancy, antepartum      Patient Active Problem List   Diagnosis    Cholelithiasis: seen on u/s     Acne    Iron deficiency    Vitamin D insufficiency    Mixed hyperlipidemia    Renal stone: R non obstructing     B12 deficiency    Generalized anxiety disorder    Poor concentration    Major depressive disorder, single episode, mild    Trauma and stressor-related disorder    At high risk for breast cancer: 22%      Family History  Family History   Problem Relation Name Age of Onset    Anxiety disorder Mother      Cancer Father          prostate    Heart disease Sister          MVP    Allergy (severe) Son Alexsander     Diabetes Maternal Aunt      Cancer Maternal Grandmother          breast    Breast cancer Maternal Grandmother  68         at 72, one breast    Lung cancer Maternal Grandmother      Parkinsonism Paternal Grandmother      Morena Parkinson White syndrome Paternal Grandmother      Colon cancer Neg Hx      Ovarian cancer Neg Hx      Uterine cancer Neg Hx      Cervical cancer Neg Hx       Medications    Current Outpatient Medications:     buPROPion (WELLBUTRIN) 75 MG tablet, Take 1 tablet (75 mg total) by mouth 2 (two) times daily., Disp: 180 tablet, Rfl: 1    cholecalciferol, vitamin D3, (VITAMIN D3) 25 mcg (1,000 unit) capsule, Take 1 capsule (1,000 Units total) by mouth once daily., Disp: 90 capsule, Rfl: 3    cyanocobalamin (VITAMIN B-12) 1000 MCG tablet, Take 1 tablet (1,000 mcg total) by mouth once daily., Disp: 30 tablet, Rfl: 12    EScitalopram oxalate (LEXAPRO) 10  "MG tablet, Take 1 tablet (10 mg total) by mouth once daily. 1/2- 1 daily, Disp: 90 tablet, Rfl: 3    LORazepam (ATIVAN) 0.5 MG tablet, Take 1 tablet (0.5 mg total) by mouth daily as needed for Anxiety., Disp: 30 tablet, Rfl: 0    norgestimate-ethinyl estradioL (ORTHO TRI-CYCLEN LO) 0.18/0.215/0.25 mg-25 mcg tablet, Take 1 tablet by mouth once daily., Disp: 30 tablet, Rfl: 11  Allergies  Review of patient's allergies indicates:  No Known Allergies    Review of Systems       See above   All other systems reviewed and are negative.    Objective:      Vitals:   Vitals:    05/06/25 0828   BP: 99/69   BP Location: Left arm   Patient Position: Sitting   Pulse: 87   Temp: 97.9 °F (36.6 °C)   TempSrc: Oral   SpO2: 100%   Weight: 62.7 kg (138 lb 3.7 oz)   Height: 5' 4" (1.626 m)       BMI: Body mass index is 23.73 kg/m².   Body surface area is 1.68 meters squared.    Physical Exam  Vitals signs reviewed.   Constitutional:  Normal appearance. NAD.   HENT: Normocephalic.   Eyes: Pupils are equal, round, and reactive to light.   Cardiovascular: Normal rate.   Pulmonary: Pulmonary effort is normal.   Musculoskeletal: Normal range of motion.   Lymphadenopathy: No cervical adenopathy. No axillary adenopathy.   Skin: Skin is warm and dry.   Neurological:  Alert and oriented to person, place, and time.   Psychiatric: Mood normal.     Breast Exam: Bilateral breast normal. No masses, no tenderness, no skin or nipple abnormalities.  No lymphadenopathy.     Laboratory Data: reviewed most recent   Imaging: reviewed most recent      General Education discussed:      1. General education: (not patient specific)    Risk factors associated with breast cancer are categorized into 2 groups: Modifiable and Non-modifiable. Modifiable risk factors include use of hormones, alcohol, smoking, diet and exercise. Non-modifiable risk factors include breast density, genetics, chest radiation, previous pregnancies, age of first period, and age of " menopause.     Factors associated with greater breast cancer risk: (this list is not patient specific)  -Increasing age The risk of breast cancer increases with older age.  -Female sex  -White race (In the [place], the highest breast cancer risk occurs among White women, although breast cancer remains the most common cancer among women of every major ethnic/racial group )  -Weight and body fat in postmenopausal women -Obesity (defined as body mass index [BMI] >=30 kg/m2) is associated with an overall increase  in morbidity and mortality. However, the risk of breast cancer associated with BMI differs by menopausal status.   ?Postmenopausal women - A higher BMI and/or perimenopausal weight gain have been consistently associated with a higher risk of breast cancer among postmenopausal women. The association between a higher BMI and postmenopausal breast cancer risk may be mediated by higher estrogen levels resulting from the peripheral conversion of estrogen precursors (from adipose tissue) to estrogen    ?Inverse relationship in premenopausal women - Unlike postmenopausal women, an increased BMI is associated with a lower risk of breast cancer in premenopausal women, particularly in early adulthood.  The explanation of this finding remains unclear.  -Tall stature -women who were >175 cm (69 inches) tall were 20 percent more likely to develop breast cancer than those <160 cm (63 inches) tall.  -Benign breast disease  -Dense breast tissue -- The density of breast tissue reflects the relative amount of glandular and connective tissue (parenchyma) to adipose tissue. Women with mammographically dense breast tissue, generally defined as dense tissue comprising >=75 percent of the breast, have a four to five times higher breast cancer risk compared with women of similar age with less or no dense tissue. Although breast density is a largely inherited trait, other factors can influence density. For example, lower density  has been associated with higher levels of physical activity  and with a low-fat, high-carbohydrate diet. In postmenopausal women, estrogen and progesterone increase breast density  while the ER antagonist tamoxifen decreases breast density.  Despite the association of exogenous hormones with breast density, breast density is not strongly correlated with endogenous hormone levels. Breast tend to become more fatty with age.   -Bone mineral density -In multiple studies, women with higher bone density have a higher breast cancer risk  -Hormonal factors:   With regard to Breast cancer -- combined oral contraceptives (JULIANO)  appear to be associated with little to no increased risk of breast cancer based on observational data. Any effect appears to be temporary and limited to current or recent (within five to seven years) JULIANO use. I will put a link here for  review:  Combined estrogen-progestin contraception: Side effects and health concerns - UpToDate     HRT.:  Of note, IVF does not appear to increase the long-term risk of breast cancer, even in women with BRCA 1 and 2 mutations.     In the Women's Health Initiative (WHI), the risk of invasive breast cancer was significantly increased with combined hormone therapy (HT) at an average follow-up of 5.6 years.     A 2019 meta-analysis of all available epidemiologic evidence on the association between menopausal hormone therapy (MHT) use and breast cancer risk has been published. The analysis included nearly 145,000 women with breast cancer (51 percent of whom had used MHT) and nearly 425,000 without breast cancer. Their findings included:  ?Similar to the WHI, estrogen-progestin regimens were associated with excess breast cancer risk. An excess risk was also seen with estrogen-only regimens (a reduction in risk was seen in the WHI). There was no excess risk with vaginal estrogens.   ?Breast cancer risk increased with the duration of systemic MHT use. Unlike previous studies,  obesity was not associated with excess risk; instead, it attenuated risk.  ?The authors of the study calculated that for women of average weight, five years of MHT use starting at age 50 years would increase their 20-year risk of breast cancer (between the ages of 50 and 69 years) by approximately:  One in every 50 users of estrogen plus daily progestin  One in every 70 users of estrogen plus intermittent progestin   One in every 200 users of estrogen-only regimens   Of note: There were important limitations in this study.   While this meta-analysis has renewed concerns for some about the association between MHT and breast cancer, we continue to suggest an individualized approach when counseling symptomatic postmenopausal women about treatment. This includes putting the potential risk of breast cancer (and cardiovascular disease) in the context of the benefits of MHT (eg, relief of vasomotor symptoms, improved sleep and quality of life, and prevention of bone loss)  -Reproductive factors -Earlier menarche (before age 12) or later menopause (after age 52), Nulliparity, Increasing age at first full-term pregnancy.   (Of note, It is estimated that for every 12 months of breastfeeding, there was a 4.3 percent reduction in the relative risk (RR) of breast cancer)  -Personal and family history of breast cancer  -Alcohol use and smoking  -Exposure to therapeutic ionizing radiation       2. Risk stratifying models:  There are several models available for stratifying breast cancer risk, and Sobeida is presently the model utilized by [facility] and is a model recommended per current NCCN guidelines.      Educational videos:   What Is a TC Score?    https://youtu.be/Cetx3MINwiO     What If I Have a High-Risk TC Score?     https://youtu.be/uGk8sVn9x1C      The Adelaide Model for Breast Cancer risk estimates the absolute 5 year risk and lifetime risk of developing breast cancer. Family history includes only first  degree relatives with breast cancer, which is not enough information to estimate the risk of a patient having BRCA mutation. It also underestimates the cancer risk for patients with extensive family history. The Adelaide Model is a good predictor of risk for populations but not for individuals. It adjusts risk for race/ethnicity. It may underestimate breast cancer risk in patients with atypical hyperplasia and strong family history. The Adelaide Model was NOT designed to estimate risk for: Women with a prior diagnosis of breast cancer, lobular carcinoma in situ (LCIS), or ductal carcinoma in situ (DCIS);  Women who have received previous radiation therapy to the chest for treatment of Hodgkin lymphoma;  Women with gene mutations in BRCA1 or BRCA2, or those who are known to have certain genetic syndromes that increase risk for breast cancer; Women of age <35 or >85.    We discussed that there are limitations to every model for risk assessment, particularly that TC can overestimate risk in women with atypical hyperplasia and dense breasts and that Adelaide underestimates risk for those with a strong family history of breast or ovarian cancers as well as non-white women with atypical hyperplasia which can make them appear to not be candidates for risk reducing therapies.               3. High risk patients:       INCREASED RISK SCREENING: per NCCN                      MRI breast:       The use of MRI for breast cancer detection is based on the concept of  tumor angiogenesis or neovascularity. Tumor-associated blood vessels have increased permeability, which leads to prompt uptake and release of gadolinium within the first one to two minutes after administration, leading to a pattern of rapid enhancement and washout on MRI.   Bilateral breast examination - Both breasts should be evaluated in an MRI study, for comparison purposes, even when concern about possible pathology involves only one breast.    Contrast - Intravenous  gadolinium contrast must be used to maximize cancer detection and is administered before breast MRI to highlight the neovascularity associated with cancers. Contrast is not necessary when the study is performed to evaluate silicone implant integrity.  Allergic and anaphylactoid reactions to gadolinium are rare, but can occur. In addition, in patients with renal failure, gadolinium can cause contrast nephropathy and/or nephrogenic systemic fibrosis.   A few studies have also reported gadolinium deposition in the brain from repeated intravenous administration, with the degree of deposition varying based on the specific contrast agent. The clinical significance of this deposition remains unknown, and no data for humans exist to show any adverse effects or harm at this time.   https://www.fda.gov/drugs/drug-safety-and-availability/fda-drug-safety-communication-fda-identifies-no-harmful-effects-date-brain-retention-gadolinium  https://www.fda.gov/Drugs/DrugSafety/ixb481163.htm    -Contact insurance company with regards to coverage of MRI breasts.   -Cannot undergo an MRI if pregnant.   -MRI's may have false positives                 RAUL (contrast enhanced mammogram):  RAUL is the main alternative for anyone who benefits by but cannot have a breast MRI with IV contrast.    Main indications:   High risk screening   Suspicious clinical symptoms with inconclusive mammogram and ultrasound   New breast cancer, evaluate extent of disease   Pre and post nacho-adjuvant systemic therapy evaluation     For high-risk screening, RAUL replaces the regular non-contrast mammogram and any other supplemental test       -RECOMMENDED LIFESTYLE MODIFICATIONS FOR HIGH RISK PATIENTS:    * Reviewed Lifestyle modifications which have shown benefit:  Limit alcohol consumption to less than 1 drink per day (1 ounce liquor, 6 oz wine, 8 oz beer) and no more than 3 drinks per week.   Avoid smoking.  Exercise at least 150 minutes per week of moderate  intensity aerobic activity or at least 75 minutes of vigorous activity. Exercise can lower the relative risk of breast cancer by ~18-20%.  Maintain healthy weight and avoid post-menopausal weight gain. Avoid processed foods and eat more lean proteins, fruits and vegetables.                               * Available resources include genetic counseling, nutrition, weight management.       -CHEMOPREVENTION:              * For women at high risk for breast cancer, endocrine therapy can reduce the risk of invasive and/or in situ breast cancers. (tamoxifen for premenopausal or postmenopausal women and raloxifene or exemestane for postmenopausal women).   -Tamoxifen 20 mg daily for 5 years has shown to reduce risk of breast cancer by 49% and women with ADH/ALH or LCIS have an even more significant reduction of risk of 86%. Aromatase inhibitors for 5 years have also shown risk reduction in terms of 50-60%. At current, there is not adequate data to recommend longer courses of therapy more than 5 years for risk reduction. A more recent study shows that Tamoxifen 5 mg for three years shows comparable risk reduction.    -Above have been shown to lower the risk of breast cancer incidence, however there is no survival benefit in patients who don't have breast cancer.   -Tamoxifen has limited data in BRCA 1/2 mutation carriers but limited retrospective data is suggestive of benefit. There is retrospective data that aromatase inhibitors can reduce the risk of contralateral ER positive breast cancers in BRCA 1/2 patients who were taking AIs as adjuvant therapy. There is no data for raloxifen in the population.    -Risks of Tamoxifen side effects include hot flashes, invasive endometrial cancer in women > 49 years of age (2.3/1000 compared to 0.9/1000), cataracts, increased risk of pulmonary embolism among others.      Assessment:     1. At high risk for breast cancer    2. Extremely dense tissue of both breasts on mammography    3.  Family history of breast cancer    4. Encounter for breast cancer screening other than mammogram        Breast Cancer Risk Stratification   Current, Estimated Breast Cancer Risk Model Used Patient's Score Risk for general population Patient's Risk Category   5-year Adelaide Model 0.8% 0.6%  [] N/A given age <35   [x] Average risk (<1.7%)   [] Increased risk (>=1.7%)   10-year Tyrer-Cuzick v8.0b 3.74%   [x] <5%   [] >=5%    Lifetime (to age 85) Tyrer-Cuzick v8.0b 23.18%  (Recalculated today)   [] Average risk (<15%)   [] Intermediate risk (>=15% - <20%)   [x] High risk (>=20%)   According to the American Cancer Society, patients with a lifetime breast cancer risk of 20% or higher might benefit from supplemental screening exams. Women with a 5-year risk greater than or equal to 1.7% may benefit from chemoprevention agents (tamoxifen, raloxifene, aromatase inhibitors) to reduce risk.      Plan:       Chemoprevention not indicated  Patient elects to proceed with alternating annual mammogram and annual breast MRI along with semiannual CBEs. She will alternate CBE here and with GYN/PCP. She will get her screening MRI in Jan 2026 and her next mammogram in June 2025 (scheduled)  Encouraged breast awareness, including monthly breast self-exams.   Recommend lifestyle modifications as above.     RTC in one year    Questions were encouraged and answered to patient's satisfaction, and patient verbalized understanding of information and agreement with the plan. Advised patient to RTC with any interval changes or concerns.        Med Onc Chart Routing      Follow up with physician    Follow up with ANNETTE 1 year. in person in the high risk clinic   Infusion scheduling note    Injection scheduling note    Labs    Imaging MRI   MRI for January 2026   Pharmacy appointment    Other referrals                  Total time of this visit, including time spent face to face with patient and/or via video/audio, and also in preparing for today's  visit for MDM and documentation. (Medical Decision Making, including consideration of possible diagnoses, management options, complex medical record review, review of diagnostic tests and information, consideration and discussion of significant complications based on comorbidities, and discussion with providers involved with the care of the patient) is 20 minutes. Greater than 50% was spent face to face with the patient counseling and coordinating care.    Nata Fitch, NP

## 2025-05-06 ENCOUNTER — OFFICE VISIT (OUTPATIENT)
Dept: HEMATOLOGY/ONCOLOGY | Facility: CLINIC | Age: 40
End: 2025-05-06
Payer: COMMERCIAL

## 2025-05-06 VITALS
BODY MASS INDEX: 23.6 KG/M2 | WEIGHT: 138.25 LBS | TEMPERATURE: 98 F | OXYGEN SATURATION: 100 % | SYSTOLIC BLOOD PRESSURE: 99 MMHG | HEART RATE: 87 BPM | HEIGHT: 64 IN | DIASTOLIC BLOOD PRESSURE: 69 MMHG

## 2025-05-06 DIAGNOSIS — Z12.39 ENCOUNTER FOR BREAST CANCER SCREENING OTHER THAN MAMMOGRAM: ICD-10-CM

## 2025-05-06 DIAGNOSIS — Z80.3 FAMILY HISTORY OF BREAST CANCER: ICD-10-CM

## 2025-05-06 DIAGNOSIS — Z91.89 AT HIGH RISK FOR BREAST CANCER: Primary | ICD-10-CM

## 2025-05-06 DIAGNOSIS — R92.343 EXTREMELY DENSE TISSUE OF BOTH BREASTS ON MAMMOGRAPHY: ICD-10-CM

## 2025-05-06 PROCEDURE — 99999 PR PBB SHADOW E&M-EST. PATIENT-LVL IV: CPT | Mod: PBBFAC,,, | Performed by: NURSE PRACTITIONER

## 2025-05-06 PROCEDURE — G2211 COMPLEX E/M VISIT ADD ON: HCPCS | Mod: S$GLB,,, | Performed by: NURSE PRACTITIONER

## 2025-05-06 PROCEDURE — 99214 OFFICE O/P EST MOD 30 MIN: CPT | Mod: S$GLB,,, | Performed by: NURSE PRACTITIONER

## 2025-05-06 PROCEDURE — 1159F MED LIST DOCD IN RCRD: CPT | Mod: CPTII,S$GLB,, | Performed by: NURSE PRACTITIONER

## 2025-05-06 PROCEDURE — 3074F SYST BP LT 130 MM HG: CPT | Mod: CPTII,S$GLB,, | Performed by: NURSE PRACTITIONER

## 2025-05-06 PROCEDURE — 3008F BODY MASS INDEX DOCD: CPT | Mod: CPTII,S$GLB,, | Performed by: NURSE PRACTITIONER

## 2025-05-06 PROCEDURE — 3078F DIAST BP <80 MM HG: CPT | Mod: CPTII,S$GLB,, | Performed by: NURSE PRACTITIONER

## 2025-05-16 ENCOUNTER — TELEPHONE (OUTPATIENT)
Dept: DERMATOLOGY | Facility: CLINIC | Age: 40
End: 2025-05-16
Payer: COMMERCIAL

## 2025-05-16 NOTE — TELEPHONE ENCOUNTER
----- Message from Khang Chavez sent at 5/15/2025  3:18 PM CDT -----  Regarding: FW: appt access  Please advise  ----- Message -----  From: Catia Bergeron  Sent: 5/15/2025  12:02 PM CDT  To: [facility] Staff  Subject: appt access                                      PATIENT CALLPt called to schedule EP skin check. Also interested in discussing potentially starting retinol treatments if possible. Please call back at [phone]

## 2025-06-06 ENCOUNTER — HOSPITAL ENCOUNTER (OUTPATIENT)
Dept: RADIOLOGY | Facility: HOSPITAL | Age: 40
Discharge: HOME | End: 2025-06-06
Attending: NURSE PRACTITIONER
Payer: COMMERCIAL

## 2025-06-06 VITALS — WEIGHT: 138 LBS | BODY MASS INDEX: 23.56 KG/M2 | HEIGHT: 64 IN

## 2025-06-06 DIAGNOSIS — Z80.3 FAMILY HISTORY OF BREAST CANCER: ICD-10-CM

## 2025-06-06 DIAGNOSIS — R92.343 EXTREMELY DENSE TISSUE OF BOTH BREASTS ON MAMMOGRAPHY: ICD-10-CM

## 2025-06-06 DIAGNOSIS — Z91.89 AT HIGH RISK FOR BREAST CANCER: ICD-10-CM

## 2025-06-06 DIAGNOSIS — Z12.31 ENCOUNTER FOR SCREENING MAMMOGRAM FOR MALIGNANT NEOPLASM OF BREAST: ICD-10-CM

## 2025-06-06 PROCEDURE — 77063 BREAST TOMOSYNTHESIS BI: CPT | Mod: 26,,, | Performed by: RADIOLOGY

## 2025-06-06 PROCEDURE — 77067 SCR MAMMO BI INCL CAD: CPT | Mod: 26,,, | Performed by: RADIOLOGY

## 2025-06-06 PROCEDURE — 77067 SCR MAMMO BI INCL CAD: CPT | Mod: TC

## 2025-06-11 ENCOUNTER — RESULTS FOLLOW-UP (OUTPATIENT)
Dept: HEMATOLOGY/ONCOLOGY | Facility: CLINIC | Age: 40
End: 2025-06-11

## 2025-07-19 NOTE — TELEPHONE ENCOUNTER
Care Due:                  Date            Visit Type   Department     Provider  --------------------------------------------------------------------------------                                MYCHART                              ANNUAL                              CHECKUP/PHY  [facility] INTERNAL  Last Visit: 09-      [facility]       Angelica Marin  Next Visit: None Scheduled  None         None Found                                                            Last  Test          Frequency    Reason                     Performed    Due Date  --------------------------------------------------------------------------------    Office Visit  12 months..  cholecalciferol,.........  09- 09-    Ca..........  12 months..  cholecalciferol,.........  09- 09-    Vitamin D...  12 months..  cholecalciferol,.........  09- 09-    [employer] Embedded Care Due Messages. Reference number: [id number].   7/19/2025 3:26:02 AM CDT

## 2025-07-19 NOTE — TELEPHONE ENCOUNTER
Refill Routing Note   Medication(s) are not appropriate for processing by [facility] for the following reason(s):        Outside of protocol    [facility] action(s):  Defer   Requires appointment : Yes   Requires labs : Yes      Medication Therapy Plan: variable dosing OOP      Appointments  past 12m or future 3m with PCP    Date Provider   Last Visit   9/25/2024 Angelica Marin MD   Next Visit   Visit date not found Angelica Marin MD   ED visits in past 90 days: 0        Note composed:12:13 PM 07/19/2025

## 2025-07-21 ENCOUNTER — TELEPHONE (OUTPATIENT)
Dept: FAMILY MEDICINE | Facility: CLINIC | Age: 40
End: 2025-07-21
Payer: COMMERCIAL

## 2025-07-21 RX ORDER — ESCITALOPRAM OXALATE 10 MG/1
TABLET ORAL
Qty: 90 TABLET | Refills: 3 | Status: SHIPPED | OUTPATIENT
Start: 2025-07-21

## 2025-07-21 NOTE — TELEPHONE ENCOUNTER
I did refill her meds    She will be due for her annual exam with me around September or October this year, please contact her to schedule, usual labs prior, thank you

## 2025-08-12 ENCOUNTER — OFFICE VISIT (OUTPATIENT)
Dept: DERMATOLOGY | Facility: CLINIC | Age: 40
End: 2025-08-12
Payer: COMMERCIAL

## 2025-08-12 DIAGNOSIS — D22.9 MULTIPLE BENIGN NEVI: Primary | ICD-10-CM

## 2025-08-12 DIAGNOSIS — Z12.83 SCREENING EXAM FOR SKIN CANCER: ICD-10-CM

## 2025-08-12 DIAGNOSIS — L81.4 LENTIGO: ICD-10-CM

## 2025-08-12 DIAGNOSIS — L73.8 SEBACEOUS GLAND HYPERPLASIA: ICD-10-CM

## 2025-08-12 DIAGNOSIS — L82.1 SK (SEBORRHEIC KERATOSIS): ICD-10-CM

## 2025-08-12 PROCEDURE — 1159F MED LIST DOCD IN RCRD: CPT | Mod: CPTII,S$GLB,, | Performed by: DERMATOLOGY

## 2025-08-12 PROCEDURE — 99999 PR PBB SHADOW E&M-EST. PATIENT-LVL III: CPT | Mod: PBBFAC,,, | Performed by: DERMATOLOGY

## 2025-08-12 PROCEDURE — 1160F RVW MEDS BY RX/DR IN RCRD: CPT | Mod: CPTII,S$GLB,, | Performed by: DERMATOLOGY

## 2025-08-12 PROCEDURE — 99213 OFFICE O/P EST LOW 20 MIN: CPT | Mod: S$GLB,,, | Performed by: DERMATOLOGY

## 2025-08-12 RX ORDER — NORGESTIMATE AND ETHINYL ESTRADIOL 7DAYSX3 LO
1 KIT ORAL
Qty: 84 TABLET | Refills: 0 | Status: SHIPPED | OUTPATIENT
Start: 2025-08-12